# Patient Record
Sex: FEMALE | Race: WHITE | NOT HISPANIC OR LATINO | Employment: FULL TIME | URBAN - METROPOLITAN AREA
[De-identification: names, ages, dates, MRNs, and addresses within clinical notes are randomized per-mention and may not be internally consistent; named-entity substitution may affect disease eponyms.]

---

## 2017-01-17 ENCOUNTER — ALLSCRIPTS OFFICE VISIT (OUTPATIENT)
Dept: OTHER | Facility: OTHER | Age: 47
End: 2017-01-17

## 2017-01-17 LAB
BILIRUB UR QL STRIP: NORMAL
CLARITY UR: NORMAL
COLOR UR: YELLOW
GLUCOSE (HISTORICAL): NORMAL
HGB UR QL STRIP.AUTO: NORMAL
KETONES UR STRIP-MCNC: NORMAL MG/DL
LEUKOCYTE ESTERASE UR QL STRIP: NORMAL
NITRITE UR QL STRIP: NORMAL
PH UR STRIP.AUTO: 5 [PH]
PROT UR STRIP-MCNC: NORMAL MG/DL
SP GR UR STRIP.AUTO: 1000
UROBILINOGEN UR QL STRIP.AUTO: NORMAL

## 2017-01-18 ENCOUNTER — TRANSCRIBE ORDERS (OUTPATIENT)
Dept: ADMINISTRATIVE | Facility: HOSPITAL | Age: 47
End: 2017-01-18

## 2017-01-18 DIAGNOSIS — R10.819 ABDOMINAL TENDERNESS, UNSPECIFIED SITE: ICD-10-CM

## 2017-01-18 DIAGNOSIS — M54.50 ACUTE BILATERAL LOW BACK PAIN WITHOUT SCIATICA: Primary | ICD-10-CM

## 2017-01-23 ENCOUNTER — ALLSCRIPTS OFFICE VISIT (OUTPATIENT)
Dept: OTHER | Facility: OTHER | Age: 47
End: 2017-01-23

## 2017-01-24 ENCOUNTER — ALLSCRIPTS OFFICE VISIT (OUTPATIENT)
Dept: OTHER | Facility: OTHER | Age: 47
End: 2017-01-24

## 2017-01-24 ENCOUNTER — HOSPITAL ENCOUNTER (OUTPATIENT)
Dept: ULTRASOUND IMAGING | Facility: HOSPITAL | Age: 47
Discharge: HOME | End: 2017-01-24
Payer: COMMERCIAL

## 2017-01-24 DIAGNOSIS — M54.50 ACUTE BILATERAL LOW BACK PAIN WITHOUT SCIATICA: ICD-10-CM

## 2017-01-24 DIAGNOSIS — N83.299 OTHER OVARIAN CYST, UNSPECIFIED SIDE: ICD-10-CM

## 2017-01-24 DIAGNOSIS — N61.0 MASTITIS WITHOUT ABSCESS: ICD-10-CM

## 2017-01-24 DIAGNOSIS — R10.819 ABDOMINAL TENDERNESS, UNSPECIFIED SITE: ICD-10-CM

## 2017-01-24 PROCEDURE — 76830 TRANSVAGINAL US NON-OB: CPT

## 2017-01-24 PROCEDURE — 76700 US EXAM ABDOM COMPLETE: CPT

## 2017-01-24 PROCEDURE — 76856 US EXAM PELVIC COMPLETE: CPT

## 2017-01-25 ENCOUNTER — GENERIC CONVERSION - ENCOUNTER (OUTPATIENT)
Dept: OTHER | Facility: OTHER | Age: 47
End: 2017-01-25

## 2017-01-25 ENCOUNTER — LAB CONVERSION - ENCOUNTER (OUTPATIENT)
Dept: OTHER | Facility: OTHER | Age: 47
End: 2017-01-25

## 2017-01-25 LAB
25(OH)D3 SERPL-MCNC: 47 NG/ML (ref 30–100)
A/G RATIO (HISTORICAL): 2.1 (CALC) (ref 1–2.5)
ALBUMIN SERPL BCP-MCNC: 4.5 G/DL (ref 3.6–5.1)
ALP SERPL-CCNC: 92 U/L (ref 33–115)
ALT SERPL W P-5'-P-CCNC: 15 U/L (ref 6–29)
AMYLASE (HISTORICAL): 33 U/L (ref 21–101)
AST SERPL W P-5'-P-CCNC: 16 U/L (ref 10–35)
BILIRUB SERPL-MCNC: 0.3 MG/DL (ref 0.2–1.2)
BUN SERPL-MCNC: 10 MG/DL (ref 7–25)
BUN/CREA RATIO (HISTORICAL): NORMAL (CALC) (ref 6–22)
CALCIUM SERPL-MCNC: 9.5 MG/DL (ref 8.6–10.2)
CHLORIDE SERPL-SCNC: 104 MMOL/L (ref 98–110)
CHOLEST SERPL-MCNC: 194 MG/DL (ref 125–200)
CHOLEST/HDLC SERPL: 3.7 (CALC)
CO2 SERPL-SCNC: 26 MMOL/L (ref 20–31)
CONTAINER TYP (HISTORICAL): NORMAL
CREAT SERPL-MCNC: 0.87 MG/DL (ref 0.5–1.1)
DEPRECATED RDW RBC AUTO: 13.2 % (ref 11–15)
EGFR AFRICAN AMERICAN (HISTORICAL): 92 ML/MIN/1.73M2
EGFR-AMERICAN CALC (HISTORICAL): 79 ML/MIN/1.73M2
FINAL RESOLUTION (HISTORICAL): NORMAL
FOLATE SERPL-MCNC: 23.8 NG/ML
GAMMA GLOBULIN (HISTORICAL): 2.1 G/DL (CALC) (ref 1.9–3.7)
GLUCOSE (HISTORICAL): 88 MG/DL (ref 65–99)
HCT VFR BLD AUTO: 44.2 % (ref 35–45)
HDLC SERPL-MCNC: 53 MG/DL
HGB BLD-MCNC: 14.4 G/DL (ref 11.7–15.5)
LDL CHOLESTEROL (HISTORICAL): 109 MG/DL (CALC)
LIPASE SERPL-CCNC: 21 U/L (ref 7–60)
MCH RBC QN AUTO: 30.7 PG (ref 27–33)
MCHC RBC AUTO-ENTMCNC: 32.5 G/DL (ref 32–36)
MCV RBC AUTO: 94.5 FL (ref 80–100)
NON-HDL-CHOL (CHOL-HDL) (HISTORICAL): 141 MG/DL (CALC)
PLATELET # BLD AUTO: 332 THOUSAND/UL (ref 140–400)
PMV BLD AUTO: 8.7 FL (ref 7.5–12.5)
POTASSIUM SERPL-SCNC: 4.2 MMOL/L (ref 3.5–5.3)
QUESTION/PROBLEM (HISTORICAL): NORMAL
RBC # BLD AUTO: 4.68 MILLION/UL (ref 3.8–5.1)
SODIUM SERPL-SCNC: 139 MMOL/L (ref 135–146)
SPECIMEN STATUS REPORT (HISTORICAL): NORMAL
T4 FREE SERPL-MCNC: 0.9 NG/DL (ref 0.8–1.8)
TOTAL PROTEIN (HISTORICAL): 6.6 G/DL (ref 6.1–8.1)
TRIGL SERPL-MCNC: 160 MG/DL
TSH SERPL DL<=0.05 MIU/L-ACNC: 1.47 MIU/L
VIT B12 SERPL-MCNC: 790 PG/ML (ref 200–1100)
WBC # BLD AUTO: 10.1 THOUSAND/UL (ref 3.8–10.8)

## 2017-01-31 ENCOUNTER — GENERIC CONVERSION - ENCOUNTER (OUTPATIENT)
Dept: OTHER | Facility: OTHER | Age: 47
End: 2017-01-31

## 2017-01-31 LAB
ADDITIONAL INFORMATION (HISTORICAL): NORMAL
ADEQUACY: (HISTORICAL): NORMAL
CYTOTECHNOLOGIST: (HISTORICAL): NORMAL
INTERPRETATION (HISTORICAL): NORMAL
LMP (HISTORICAL): NORMAL
PREV. BX: (HISTORICAL): NORMAL
PREV. PAP (HISTORICAL): NORMAL
SOURCE (HISTORICAL): NORMAL

## 2017-02-01 ENCOUNTER — HOSPITAL ENCOUNTER (OUTPATIENT)
Dept: ULTRASOUND IMAGING | Facility: CLINIC | Age: 47
Discharge: HOME | End: 2017-02-01
Payer: COMMERCIAL

## 2017-02-01 DIAGNOSIS — N61.0 MASTITIS WITHOUT ABSCESS: ICD-10-CM

## 2017-02-01 PROCEDURE — 76642 ULTRASOUND BREAST LIMITED: CPT

## 2017-02-02 ENCOUNTER — GENERIC CONVERSION - ENCOUNTER (OUTPATIENT)
Dept: OTHER | Facility: OTHER | Age: 47
End: 2017-02-02

## 2017-02-03 ENCOUNTER — LAB REQUISITION (OUTPATIENT)
Dept: LAB | Facility: HOSPITAL | Age: 47
End: 2017-02-03
Payer: COMMERCIAL

## 2017-02-03 ENCOUNTER — ALLSCRIPTS OFFICE VISIT (OUTPATIENT)
Dept: OTHER | Facility: OTHER | Age: 47
End: 2017-02-03

## 2017-02-03 DIAGNOSIS — R93.89 ABNORMAL FINDINGS ON DIAGNOSTIC IMAGING OF OTHER SPECIFIED BODY STRUCTURES: ICD-10-CM

## 2017-02-03 PROCEDURE — 88305 TISSUE EXAM BY PATHOLOGIST: CPT | Performed by: OBSTETRICS & GYNECOLOGY

## 2017-02-13 ENCOUNTER — GENERIC CONVERSION - ENCOUNTER (OUTPATIENT)
Dept: OTHER | Facility: OTHER | Age: 47
End: 2017-02-13

## 2017-02-20 ENCOUNTER — ALLSCRIPTS OFFICE VISIT (OUTPATIENT)
Dept: OTHER | Facility: OTHER | Age: 47
End: 2017-02-20

## 2017-03-10 ENCOUNTER — ALLSCRIPTS OFFICE VISIT (OUTPATIENT)
Dept: OTHER | Facility: OTHER | Age: 47
End: 2017-03-10

## 2017-03-13 ENCOUNTER — ALLSCRIPTS OFFICE VISIT (OUTPATIENT)
Dept: OTHER | Facility: OTHER | Age: 47
End: 2017-03-13

## 2017-04-10 ENCOUNTER — ALLSCRIPTS OFFICE VISIT (OUTPATIENT)
Dept: OTHER | Facility: OTHER | Age: 47
End: 2017-04-10

## 2017-05-04 ENCOUNTER — ALLSCRIPTS OFFICE VISIT (OUTPATIENT)
Dept: OTHER | Facility: OTHER | Age: 47
End: 2017-05-04

## 2017-05-08 ENCOUNTER — ALLSCRIPTS OFFICE VISIT (OUTPATIENT)
Dept: OTHER | Facility: OTHER | Age: 47
End: 2017-05-08

## 2017-06-05 ENCOUNTER — ALLSCRIPTS OFFICE VISIT (OUTPATIENT)
Dept: OTHER | Facility: OTHER | Age: 47
End: 2017-06-05

## 2017-06-19 ENCOUNTER — ALLSCRIPTS OFFICE VISIT (OUTPATIENT)
Dept: OTHER | Facility: OTHER | Age: 47
End: 2017-06-19

## 2017-07-20 ENCOUNTER — GENERIC CONVERSION - ENCOUNTER (OUTPATIENT)
Dept: OTHER | Facility: OTHER | Age: 47
End: 2017-07-20

## 2017-08-02 ENCOUNTER — ALLSCRIPTS OFFICE VISIT (OUTPATIENT)
Dept: OTHER | Facility: OTHER | Age: 47
End: 2017-08-02

## 2017-08-17 DIAGNOSIS — N83.202 CYST OF LEFT OVARY: ICD-10-CM

## 2017-08-17 DIAGNOSIS — N83.299 OTHER OVARIAN CYST, UNSPECIFIED SIDE: ICD-10-CM

## 2017-08-17 DIAGNOSIS — N83.201 CYST OF RIGHT OVARY: ICD-10-CM

## 2017-08-17 DIAGNOSIS — R93.89 ABNORMAL FINDINGS ON DIAGNOSTIC IMAGING OF OTHER SPECIFIED BODY STRUCTURES: ICD-10-CM

## 2017-08-17 DIAGNOSIS — R06.00 DYSPNEA: ICD-10-CM

## 2017-09-05 ENCOUNTER — ALLSCRIPTS OFFICE VISIT (OUTPATIENT)
Dept: OTHER | Facility: OTHER | Age: 47
End: 2017-09-05

## 2017-09-20 ENCOUNTER — ALLSCRIPTS OFFICE VISIT (OUTPATIENT)
Dept: OTHER | Facility: OTHER | Age: 47
End: 2017-09-20

## 2017-10-26 ENCOUNTER — ALLSCRIPTS OFFICE VISIT (OUTPATIENT)
Dept: OTHER | Facility: OTHER | Age: 47
End: 2017-10-26

## 2017-10-26 DIAGNOSIS — Z12.39 ENCOUNTER FOR OTHER SCREENING FOR MALIGNANT NEOPLASM OF BREAST: ICD-10-CM

## 2017-10-26 DIAGNOSIS — Z11.59 ENCOUNTER FOR SCREENING FOR OTHER VIRAL DISEASES (CODE): ICD-10-CM

## 2017-10-26 DIAGNOSIS — R07.9 CHEST PAIN: ICD-10-CM

## 2017-10-27 ENCOUNTER — LAB CONVERSION - ENCOUNTER (OUTPATIENT)
Dept: OTHER | Facility: OTHER | Age: 47
End: 2017-10-27

## 2017-10-27 ENCOUNTER — GENERIC CONVERSION - ENCOUNTER (OUTPATIENT)
Dept: OTHER | Facility: OTHER | Age: 47
End: 2017-10-27

## 2017-10-27 LAB
HEPATITIS C ANTIBODY (HISTORICAL): NORMAL
SIGNAL TO CUT-OFF (HISTORICAL): 0.01

## 2017-11-06 ENCOUNTER — TRANSCRIBE ORDERS (OUTPATIENT)
Dept: ADMINISTRATIVE | Facility: HOSPITAL | Age: 47
End: 2017-11-06

## 2017-11-06 DIAGNOSIS — Z12.39 SCREENING BREAST EXAMINATION: Primary | ICD-10-CM

## 2017-11-27 ENCOUNTER — ALLSCRIPTS OFFICE VISIT (OUTPATIENT)
Dept: OTHER | Facility: OTHER | Age: 47
End: 2017-11-27

## 2017-12-06 ENCOUNTER — HOSPITAL ENCOUNTER (OUTPATIENT)
Dept: MAMMOGRAPHY | Facility: CLINIC | Age: 47
Discharge: HOME | End: 2017-12-06
Payer: COMMERCIAL

## 2017-12-06 ENCOUNTER — GENERIC CONVERSION - ENCOUNTER (OUTPATIENT)
Dept: FAMILY MEDICINE CLINIC | Facility: HOSPITAL | Age: 47
End: 2017-12-06

## 2017-12-06 DIAGNOSIS — Z12.39 SCREENING BREAST EXAMINATION: ICD-10-CM

## 2017-12-06 PROCEDURE — G0202 SCR MAMMO BI INCL CAD: HCPCS

## 2017-12-06 PROCEDURE — 77063 BREAST TOMOSYNTHESIS BI: CPT

## 2017-12-08 ENCOUNTER — GENERIC CONVERSION - ENCOUNTER (OUTPATIENT)
Dept: OTHER | Facility: OTHER | Age: 47
End: 2017-12-08

## 2017-12-12 DIAGNOSIS — K76.9 LIVER DISEASE: ICD-10-CM

## 2017-12-12 DIAGNOSIS — J45.30 MILD PERSISTENT ASTHMA, UNCOMPLICATED: ICD-10-CM

## 2017-12-12 DIAGNOSIS — Z12.39 ENCOUNTER FOR OTHER SCREENING FOR MALIGNANT NEOPLASM OF BREAST: ICD-10-CM

## 2017-12-13 ENCOUNTER — GENERIC CONVERSION - ENCOUNTER (OUTPATIENT)
Dept: OTHER | Facility: OTHER | Age: 47
End: 2017-12-13

## 2017-12-13 ENCOUNTER — HOSPITAL ENCOUNTER (OUTPATIENT)
Dept: MAMMOGRAPHY | Facility: CLINIC | Age: 47
Discharge: HOME | End: 2017-12-13
Payer: COMMERCIAL

## 2017-12-13 ENCOUNTER — HOSPITAL ENCOUNTER (OUTPATIENT)
Dept: ULTRASOUND IMAGING | Facility: CLINIC | Age: 47
Discharge: HOME | End: 2017-12-13
Payer: COMMERCIAL

## 2017-12-13 ENCOUNTER — GENERIC CONVERSION - ENCOUNTER (OUTPATIENT)
Dept: FAMILY MEDICINE CLINIC | Facility: HOSPITAL | Age: 47
End: 2017-12-13

## 2017-12-13 DIAGNOSIS — R92.8 ABNORMAL MAMMOGRAM: ICD-10-CM

## 2017-12-13 PROCEDURE — G0279 TOMOSYNTHESIS, MAMMO: HCPCS

## 2017-12-13 PROCEDURE — G0206 DX MAMMO INCL CAD UNI: HCPCS

## 2017-12-13 PROCEDURE — 76642 ULTRASOUND BREAST LIMITED: CPT

## 2017-12-15 ENCOUNTER — GENERIC CONVERSION - ENCOUNTER (OUTPATIENT)
Dept: FAMILY MEDICINE CLINIC | Facility: HOSPITAL | Age: 47
End: 2017-12-15

## 2017-12-20 ENCOUNTER — ALLSCRIPTS OFFICE VISIT (OUTPATIENT)
Dept: OTHER | Facility: OTHER | Age: 47
End: 2017-12-20

## 2017-12-27 ENCOUNTER — ALLSCRIPTS OFFICE VISIT (OUTPATIENT)
Dept: OTHER | Facility: OTHER | Age: 47
End: 2017-12-27

## 2017-12-28 NOTE — PROGRESS NOTES
Assessment   1  Mild persistent asthma without complication (394 78) (T10 46)   2  Depression, major, recurrent (296 30) (F33 9)    Plan   Depression, major, recurrent    · Sertraline HCl - 100 MG Oral Tablet; TAKE 1 1/ 2 TABLETS  DAILY  Mild persistent asthma without complication    · From  Ventolin  (90 Base) MCG/ACT Inhalation Aerosol Solution INHALE 2 PUFFS    BY MOUTH ONCE EVERY 6 HOURS AS NEEDED To Ventolin  (90 Base) MCG/ACT Inhalation    Aerosol Solution INHALE 2 PUFFS EVERY 4 TO 6 HOURS AS NEEDED    Discussion/Summary   Discussion Summary:    Health plan covers a nicotine patch or nicotine gums to help quit smoking  Counseling Documentation With Imm: The was counseled regarding instructions for management  Self Referrals:    Self Referrals: Yes      Chief Complaint   Chief Complaint Free Text Note Form: Here for f/u  dk   in emergi-care since last here  lv       History of Present Illness   Asthma (Follow-Up): The patient is being seen for a routine clinic follow-up of asthma  The patient's long-term asthma pattern has been classified as intermittent  Interval Symptoms:    denies wheezing,-- improved coughing-- and-- denies shortness of breath  Associated symptoms include no chest pain or discomfort  Review of Systems   Complete-Female:      Constitutional: no fever  Cardiovascular: as noted in HPI  Respiratory: as noted in HPI  Psychiatric: stable on zoloft 150mg, but-- no anxiety-- and-- no depression  Active Problems   1  Anxiety (300 00) (F41 9)   2  Complex ovarian cyst (620 2) (N83 299)   3  Depression, major, recurrent (296 30) (F33 9)   4  Mild persistent asthma without complication (896 21) (N12 43)   5  PTSD (post-traumatic stress disorder) (309 81) (F43 10)   6  Tobacco abuse (305 1) (Z72 0)    Past Medical History   1  Diarrhea (787 91) (R19 7)   2  History of Dizziness and giddiness (780 4) (R42)   3   History of Finger sprain (842 10) (S50 375F) 4  History of dysmenorrhea (V13 29) (Z87 42)   5  History of hypertension (V12 59) (Z86 79)   6  History of irritable bowel syndrome (V12 79) (Z87 19)   7  History of sinusitis (V12 69) (Z87 09)   8  History of skin disorder (V13 3) (Z87 2)   9  History of tinea corporis (V12 09) (Z86 19)   10  History of UTI (V13 02) (Z87 440)   11  History of Knee contusion (924 11) (S80 00XA)   12  History of Loose stools (787 7) (R19 5)   13  History of Lumbar back sprain (847 2) (S33 5XXA)   14  History of Menopausal symptoms (627 2) (N95 1)   15  History of Screening for colon cancer (V76 51) (Z12 11)   16  History of Tick bite (919 4,E906 4) (W57 XXXA)   17  History of Viral labyrinthitis (386 35) (H83 09)  Active Problems And Past Medical History Reviewed: The active problems and past medical history were reviewed and updated today  Surgical History   1  History of Back Surgery   2  History of Cholecystectomy   3  History of Gallbladder Surgery   4  History of Knee Surgery   5  History of Surgery   6  History of Tonsillectomy    Family History   Mother    1  Family history of Bipolar affective disorder   2  Family history of Diverticulitis   3  Family history of coagulation disorder (V18 3) (Z83 2)   4  Family history of Suicidal ideation  Sibling    5  Family history of Aortic aneurysm  Sister    6  Family history of OCD (obsessive compulsive disorder)  Brother    7  Family history of Bipolar affective   8  Family history of substance abuse (V17 0) (Z81 4)   9  Family history of suicide attempt (V17 0) (Z81 8)  Family History    10  Family history of abdominal aortic aneurysm (V17 49) (Z82 49)   11  Family history of diabetes mellitus (V18 0) (Z83 3)    Social History    · Always uses seat belt   · Cultural background   · Current every day smoker (305 1) (F17 200)   · No drug use   · Non drinker / no alcohol use   · Primary language is English   · Racial background   · Single  Social History Reviewed:  The social history was reviewed and updated today  Current Meds    1  Multi-Vitamin Oral Tablet; TAKE 1 TABLET DAILY; Therapy: 87XQM6859 to Recorded   2  Sertraline HCl - 100 MG Oral Tablet; TAKE 1 1/ 2 TABLETS  DAILY; Therapy: 46SBU2818 to [phone]  Requested for: 87XBU1111; Last Rx:11Oct2017     Ordered   3  Super B Complex Maxi Oral Tablet; Therapy: (Recorded:17Jan2017) to Recorded   4  Symbicort 160-4 5 MCG/ACT Inhalation Aerosol; INHALE 2 PUFFS Twice daily; Therapy: 22HIK1217 to (Last Rx:14Cxs5441) Ordered   5  Ventolin  (90 Base) MCG/ACT Inhalation Aerosol Solution; INHALE 2 PUFFS BY MOUTH     ONCE EVERY 6 HOURS AS NEEDED; Therapy: 82VZN0910 to [phone])  Requested for: 62Yes3829; Last Rx:78Kte7433;     Status: ACTIVE - Transmit to Pharmacy - Awaiting Verification Ordered   6  Vitamin D 1000 UNIT CAPS; TAKE 2 CAPSULE Daily; Therapy: 70NSE9138 to Recorded  Medication List Reviewed: The medication list was reviewed and updated today  Allergies   1  Levaquin TABS   2  Penicillins    Vitals   Vital Signs    Recorded: 27NUL1959 08:27AM   Heart Rate 96   Respiration 16   Systolic 661, LUE, Sitting   Diastolic 76, LUE, Sitting   Height 5 ft 3 in   Weight 169 lb    BMI Calculated 29 94   BSA Calculated 1 8     Physical Exam        Constitutional      General appearance: Abnormal   well developed,-- comfortable-- and-- overweight  Ears, Nose, Mouth, and Throat      Oropharynx: Normal with no erythema, edema, exudate or lesions  Pulmonary      Auscultation of lungs: Clear to auscultation  Cardiovascular      Auscultation of heart: Normal rate and rhythm, normal S1 and S2, without murmurs  Abdomen      Abdomen: Non-tender, no masses         Psychiatric      Mood and affect: Normal           Future Appointments      Date/Time Provider Specialty Site   01/17/2018 05:00 PM John Choe [facility] 02/14/2018 03:00 PM ARETHA [facility]   03/05/2018 06:00 PM Russ Stanford [facility]   04/16/2018 06:00 PM Russ Stanford [facility]   03/07/2018 09:00 AM Burke Gamble [facility] 81     Signatures    Electronically signed by : WILLIAM Aguiar ; Dec 27 2017  8:53AM EST                       (Author)

## 2018-01-10 NOTE — PSYCH
Progress Note  Psychotherapy Provided [facility]: Individual Psychotherapy 45 minutes provided today  Goals addressed in session:   Goals: 1, 2 & 3  D- Zhao Amaya stated that she had been continuing to struggle with her stress level due to the issues at work  She continued to experience a lack of motivation and energy as well as focus and concentration  She stated that she consulted with her PCP who is adjusting her medication  She stated that she is beginning to experience some relief in terms of her symptoms  Continuing to work on i creasing boundary setting  Also continuing to work on processing the loss of her mother  Giving supportive therapy  A- progress - Continuing to process her emotions  P- Continue treatment       Pain Scale and Suicide Risk [facility]: Current Pain Assessment: no pain   On a scale of 0 to 10, the patient rates current pain at 0   Behavioral Health Treatment Plan [facility]: Diagnosis and Treatment Plan explained to patient, patient relates understanding diagnosis and is agreeable to Treatment Plan  Assessment    1  Anxiety (300 00) (F41 9)   2   Depressive disorder (311) (F32 9)    Signatures   Electronically signed by : Samanta Benítez LCSW; Mar 15 2017 11:06AM EST                       (Author)

## 2018-01-10 NOTE — PSYCH
Treatment Plan Tracking      #2 Treatment Plan not completed within required time limits due to: Client presented with emotional/behavioral issues that required clinical intervention          Signatures   Electronically signed by : Kateryna Kaiser LCSW; Jun 6 2017 12:05PM EST                       (Author)

## 2018-01-10 NOTE — RESULT NOTES
Verified Results  (Q) THINPREP PAP [id number] 12:00AM Sherice Tamez Gilda     Test Name Result Flag Reference   CLINICAL INFORMATION:      NONE GIVEN   LMP:      NONE GIVEN   PREV  PAP:      NONE GIVEN   PREV  BX:      NONE GIVEN   SOURCE:      CERVICAL   STATEMENT OF ADEQUACY:      Satisfactory for evaluation  Endocervical/transformation zone component  present  Age and/or menstrual status not provided   INTERPRETATION/RESULT:      Negative for intraepithelial lesion or malignancy     CYTOTECHNOLOGIST:      RASHARD Thurman(ASCP)  CT screening location: [address] [facility], [address] [place] [address]

## 2018-01-10 NOTE — PSYCH
Progress Note  Psychotherapy Provided [facility]: Individual Psychotherapy 45 minutes provided today  Goals addressed in session:   Goals: Dealing with stress  D- Van Thornton stated that she ha continued to struggle with stress at work as well as her mother's death  Discussing her relationship with her mother and ways that she can continue to process her emotions and continue with the grieving process  PT also continuing to express her disappointment in her father as well as her anger  Continuing to work on use of coping mechanisms ans self expression  Giving supportive therapy  A- Progress - COntinuing to process her emotions  P- Continue treatment       Pain Scale and Suicide Risk [facility]: Current Pain Assessment: no pain   Behavioral Health Treatment Plan [facility]: Diagnosis and Treatment Plan explained to patient, patient relates understanding diagnosis and is agreeable to Treatment Plan  Assessment    1  Anxiety (300 00) (F41 9)   2   Depressive disorder (311) (F32 9)    Signatures   Electronically signed by : Abhishek Nice LCSW; Jun 8 2016  4:52PM EST                       (Author)

## 2018-01-10 NOTE — PSYCH
Treatment Plan Tracking      #2 Treatment Plan not completed within required time limits due to: Client presented with emotional/behavioral issues that required clinical intervention          Signatures   Electronically signed by : Emeterio Hayes LCSW; Jun 19 2017  1:01PM EST                       (Author)

## 2018-01-10 NOTE — PSYCH
Treatment Plan Tracking      #2 Treatment Plan not completed within required time limits due to: Client presented with emotional/behavioral issues that required clinical intervention          Signatures   Electronically signed by : Adriano Delatorre LCSW; Nov 28 2017 10:41AM EST                       (Author)

## 2018-01-11 NOTE — PSYCH
Progress Note  Psychotherapy Provided [facility]: Individual Psychotherapy 45 minutes provided today  Goals addressed in session:   Goals: 1  D- Valentina Lora stated that she continues to struggle greatly with the stress in her life created by her job  She stated that she has been experiencing a lack of motivation to complete things at home due to hr exhaustion from work  She also stated that coworkers continue to leave and she is uncertain what to do with her situation  Exploring her options and discussing the unhealthy environment that she is in and how it is affecting her  Continuing to work on use of coping mechanisms for stress as well as boundary setting in her job  Giving supportive therapy  A - Progress - Continuing to process her emotions  P-Continue treatment       Pain Scale and Suicide Risk [facility]: Current Pain Assessment: no pain   On a scale of 0 to 10, the patient rates current pain at 0   Behavioral Health Treatment Plan [facility]: Diagnosis and Treatment Plan explained to patient, patient relates understanding diagnosis and is agreeable to Treatment Plan  Assessment    1  Anxiety (300 00) (F41 9)   2  Depression, major, recurrent (296 30) (F33 9)   3   PTSD (post-traumatic stress disorder) (309 81) (F43 10)    Signatures   Electronically signed by : Janine Mendez LCSW; Nov 28 2017 10:41AM EST                       (Author)

## 2018-01-11 NOTE — PSYCH
Progress Note  Psychotherapy Provided [facility]: Individual Psychotherapy minutes provided today  Goals addressed in session:   Goals: Dealing with the loss of her mother  D- PT stated that she is continuing to struggle with her mother's death  PT shared that she has a lack of motivation to get out of bed in the morning and also to complete household tasks  Processing her emotions and discussing the circumstances surrounding her mother's death  PT expressing anger and hurt because her mother hadn't been taking care of herself and had also not been forthcoming with how ill she was  Discussing writing her mother a letter expressing her emotions and going to her final resting place to read it to he  Also continuing to discuss self care and the use of her support system  Giving supportive therapy  A- Progress - Continuing to process her emotions  P- COntinue treatment       Pain Scale and Suicide Risk [facility]: Current Pain Assessment: no pain   Current suicide risk is low   Behavioral Health Treatment Plan [facility]: Diagnosis and Treatment Plan explained to patient, patient relates understanding diagnosis and is agreeable to Treatment Plan  Assessment    1   Depressive disorder (311) (F32 9)    Signatures   Electronically signed by : Adriano Delatorre LCSW; Feb 5 2016  9:56AM EST                       (Author)

## 2018-01-12 VITALS
BODY MASS INDEX: 30.66 KG/M2 | TEMPERATURE: 98.2 F | WEIGHT: 173.03 LBS | HEART RATE: 84 BPM | DIASTOLIC BLOOD PRESSURE: 76 MMHG | SYSTOLIC BLOOD PRESSURE: 102 MMHG | HEIGHT: 63 IN

## 2018-01-12 VITALS
SYSTOLIC BLOOD PRESSURE: 122 MMHG | WEIGHT: 172 LBS | HEIGHT: 63 IN | BODY MASS INDEX: 30.48 KG/M2 | DIASTOLIC BLOOD PRESSURE: 76 MMHG

## 2018-01-12 NOTE — PSYCH
Progress Note  Psychotherapy Provided [facility]: Individual Psychotherapy 45 minutes provided today  Goals addressed in session:   Goals: 1 & 2  D- Eliezer Hanley stated that work continues to be extremely stressful  She stated that she did speak to her supervisor regarding her coworkers inappropriate behaviors  Processing her emotions and discussing ways to cope with the unhealthy environment as well as seek other positions  Continuing to work on setting boundaries with her coworkers  Giving supportive therapy  A- Progress- Continuing to work on use of coping mechanisms and boundary setting  P- Continue treatment       Pain Scale and Suicide Risk [facility]: Current Pain Assessment: no pain   On a scale of 0 to 10, the patient rates current pain at 0   Behavioral Health Treatment Plan [facility]: Diagnosis and Treatment Plan explained to patient, patient relates understanding diagnosis and is agreeable to Treatment Plan  Assessment    1  Anxiety (300 00) (F41 9)   2   Depressive disorder (311) (F32 9)    Signatures   Electronically signed by : Jared Torres LCSW; Jan 25 2017  3:36PM EST                       (Author)

## 2018-01-12 NOTE — PSYCH
Progress Note  Psychotherapy Provided [facility]: Individual Psychotherapy 45 minutes provided today  Goals addressed in session:   Goals: Dealing with stress at work  D- PT stated that she has been experiencing stress at work with several co-workers  Discussing the issues and ways to set effective boundaries  Processing her emotions and discussing ways to communicate effectively with her co-workers when they have upset her  PT continuing to communicate her grief regarding the loss of her mother  Giving supportive therapy  A- Progress - Continuing to process her emotions  P- Continue treatment       Pain Scale and Suicide Risk [facility]: Current Pain Assessment: no pain   Current suicide risk is low   Behavioral Health Treatment Plan [facility]: Diagnosis and Treatment Plan explained to patient, patient relates understanding diagnosis and is agreeable to Treatment Plan  Assessment    1  Anxiety (300 00) (F41 9)   2   Depressive disorder (311) (F32 9)    Signatures   Electronically signed by : Maxime Patrick LCSW; Mar 10 2016  9:32AM EST                       (Author)

## 2018-01-13 VITALS
WEIGHT: 167 LBS | DIASTOLIC BLOOD PRESSURE: 78 MMHG | SYSTOLIC BLOOD PRESSURE: 134 MMHG | HEIGHT: 63 IN | TEMPERATURE: 98.8 F | BODY MASS INDEX: 29.59 KG/M2 | HEART RATE: 88 BPM

## 2018-01-13 VITALS
HEIGHT: 63 IN | WEIGHT: 169.5 LBS | BODY MASS INDEX: 30.03 KG/M2 | HEART RATE: 72 BPM | SYSTOLIC BLOOD PRESSURE: 136 MMHG | DIASTOLIC BLOOD PRESSURE: 70 MMHG

## 2018-01-13 VITALS
HEART RATE: 80 BPM | SYSTOLIC BLOOD PRESSURE: 130 MMHG | WEIGHT: 171.03 LBS | HEIGHT: 63 IN | BODY MASS INDEX: 30.3 KG/M2 | DIASTOLIC BLOOD PRESSURE: 72 MMHG

## 2018-01-13 NOTE — PSYCH
Treatment Plan Tracking      #2 Treatment Plan not completed within required time limits due to: Client presented with emotional/behavioral issues that required clinical intervention          Signatures   Electronically signed by : Adele Blevins LCSW; Aug  3 2017 10:54AM EST                       (Author)

## 2018-01-13 NOTE — PSYCH
Date of Initial Treatment Plan: 6/8/16  Date of Current Treatment Plan: 6/8/16  Treatment Plan 1  Strengths/Personal Resources for Self Care: Kuldeep Aquino, hard working, ambitious, good leader, creative, perceptive  Diagnosis:   Axis I: Anxiety, depressive disorder   Axis II: Deferred   Axis III: Good health     Current Challenges/Problems/Needs: Work  Boundaries  Loss of mother  Long Term Goals:   Be more professional   Target Date: 10/8/16      don't let myself be pushed around   Target Date: 10/8/16      Be able to start my life now   Target Date: 10/8/16      Short Term Objectives:   Goal 1:   Keep my temper in check  Figure how to get what I want but without being unprofessional  Deal appropriately with my over controlling boss  Target Date: 10/8/16      Goal 2:   Make boundaries clear early in the relationship  Be consistent with boundary setting  Be able to set boundaries without being mean  Target Date: 10/8/16      Goal 3:   Adapt to my new situation  Be able to process my grief  be able to restart my hobbies and not be sad  Figure out how to increase my ambition level to normal      Target Date: 10/8/16      GOAL 1: Modality: Individual 2 x per month Target Date: 10/8/16         GOAL 2: Modality: Individual 2 x per month Target Date: 10/8/16         GOAL 3: Modality: Individual 2 x per month Target Date: 10/8/16         The first scheduled review date is 10/8/16  The expected length of service is Unknown  Level of functioning at initial assessment: 60  The highest level of functioning in the past year was 60  The current level of functioning is 60  Patient Signature: _________________________________ Date/Time: ______________        1  Anxiety (300 00) (F41 9)   2  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

## 2018-01-13 NOTE — PSYCH
Progress Note  Psychotherapy Provided [facility]: Individual Psychotherapy 45 minutes provided today  Goals addressed in session:   Goals: 1  D- Evan Keller stated that she has been struggling with issues at work  She state that the environment at work continues to become more toxic  processing her emotions and continuing to discuss ways to set effective limits at work  ALso continuing to discuss ways to set boundaries with her supervisor and coworkers  Giving supportive therapy  A- Progress - Continuing to process her emotions  P- Continue treatment       Pain Scale and Suicide Risk [facility]: Current Pain Assessment: no pain   On a scale of 0 to 10, the patient rates current pain at 0   Behavioral Health Treatment Plan [facility]: Diagnosis and Treatment Plan explained to patient, patient relates understanding diagnosis and is agreeable to Treatment Plan  Assessment    1  Anxiety (300 00) (F41 9)   2   Depressive disorder (311) (F32 9)    Signatures   Electronically signed by : Guille Bar LCSW; May 17 2017  5:41PM EST                       (Author)

## 2018-01-13 NOTE — PSYCH
Progress Note  Psychotherapy Provided [facility]: Individual Psychotherapy 45 minutes provided today  Goals addressed in session:   Goals: 2  D- Leonel Schlatter stated that she is still experiencing a large amount of stress at work  She shared that she has been experiencing migraine headaches and high blood pressure  Discussing the importance of self care and use of healthy coping mechanisms  Leonel Schlatter discussing various work scenarios that she is experiencing  Role playing and utilizing problem solving skills for each situation  Giving supportive therapy  A- progress - Continuing to process her emotions  P- Continue treatment       Pain Scale and Suicide Risk [facility]: Current Pain Assessment: moderate to severe   On a scale of 0 to 10, the patient rates current pain at 5   Behavioral Health Treatment Plan [facility]: Diagnosis and Treatment Plan explained to patient, patient relates understanding diagnosis and is agreeable to Treatment Plan  Assessment    1  Anxiety (300 00) (F41 9)   2   Depressive disorder (311) (F32 9)    Signatures   Electronically signed by : Amor Regalado LCSW; Aug  8 2016  7:12PM EST                       (Author)

## 2018-01-13 NOTE — PSYCH
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)   3  Depressive disorder (311) (F32 9)   4  Diaphragmatic hernia (553 3) (K44 9)   5  Meniscal injury (959 7) (S83 90XA)   6  Other screening mammogram (V76 12) (Z12 31)   7  Severe persistent asthmatic bronchitis without complication (972 51) (Z12 51)   8  Sinusitis, bacterial (473 9,041 9) (J32 9,B96 89)    Current Meds   1  Cefuroxime Axetil 500 MG Oral Tablet (Ceftin); 1 pill bid for 10 days; Therapy: 87JIA4902 to (Last Rx:10Oct2016)  Requested for: 37DYC1683 Ordered   2  Multi-Vitamin Oral Tablet; TAKE 1 TABLET DAILY; Therapy: 27YHQ6914 to Recorded   3  Sertraline HCl - 50 MG Oral Tablet; 1 AND 1/2 TABS DAILY; Therapy: (Rozell Epley) to  Requested for: 42FEK9032 Recorded   4  Vitamin D 1000 UNIT CAPS; TAKE 2 CAPSULE Daily; Therapy: 84GXH4719 to Recorded    Allergies    1  Levaquin TABS   2   Penicillins    Signatures   Electronically signed by : Lorenzo Guerrero LCSW; Oct 12 2016  6:34PM EST                       (Author)

## 2018-01-13 NOTE — PSYCH
Treatment Plan Tracking      #2 Treatment Plan not completed within required time limits due to: Client presented with emotional/behavioral issues that required clinical intervention          Signatures   Electronically signed by : Charo Crain LCSW; Sep 20 2017  7:02PM EST                       (Author)

## 2018-01-14 NOTE — PSYCH
Treatment Plan Tracking      #2 Treatment Plan not completed within required time limits due to: Client presented with emotional/behavioral issues that required clinical intervention          Signatures   Electronically signed by : Samanta Benítez LCSW; Mar 15 2017 11:07AM EST                       (Author)

## 2018-01-14 NOTE — PSYCH
Progress Note  Psychotherapy Provided [facility]: Individual Psychotherapy 45 minutes provided today  Goals addressed in session:   Goals: 1 & 2  d- Evan Keller stated that she continues to struggle with stress in the work place  She shared that she was given a warning by her boss related to an incident that she was not responsible for  Discussing and role playing ways for her to communicate effectively with her boss  Also discussing how she can continue to advocate for herself through human resources  Continuing to work on use of healthy coping skills for stress  Giving supportive therapy  A- Progress- Continuing to increase assertiveness  P- COntinue treatment       Pain Scale and Suicide Risk [facility]: Current Pain Assessment: no pain   On a scale of 0 to 10, the patient rates current pain at 0   Behavioral Health Treatment Plan [facility]: Diagnosis and Treatment Plan explained to patient, patient relates understanding diagnosis and is agreeable to Treatment Plan  Assessment    1  Anxiety (300 00) (F41 9)   2   Depressive disorder (311) (F32 9)    Signatures   Electronically signed by : Guille Bar LCSW; Jul 11 2016 11:55AM EST                       (Author)

## 2018-01-14 NOTE — PSYCH
Progress Note  Psychotherapy Provided [facility]: Individual Psychotherapy 45 minutes provided today  Goals addressed in session:   Goals: 1  D- Janene Martinez stated that she has been under an extreme amount of stress with her job  In addition, she had an reaction to the Effexor medication and had been ill for several months  Discussing the stress at work and ways to cope in addition to continuing to work on increasing assertive behavior and boundary setting in the work o=place  Giving supportive therapy  A- PRogress - Continuing to work on increasing assertive behavior and boundary setting in the workplace  P-Continue treatment       Pain Scale and Suicide Risk [facility]: Current Pain Assessment: no pain   On a scale of 0 to 10, the patient rates current pain at 0   Behavioral Health Treatment Plan [address] [place] [address] [address]: Diagnosis and Treatment Plan explained to patient, patient relates understanding diagnosis and is agreeable to Treatment Plan  Assessment    1  Anxiety (300 00) (F41 9)   2  Depression, major, recurrent (296 30) (F33 9)   3   PTSD (post-traumatic stress disorder) (309 81) (F43 10)    Signatures   Electronically signed by : Valentine Lorenz LCSW; Sep 20 2017  7:02PM EST                       (Author)

## 2018-01-15 VITALS
DIASTOLIC BLOOD PRESSURE: 78 MMHG | SYSTOLIC BLOOD PRESSURE: 138 MMHG | HEIGHT: 63 IN | TEMPERATURE: 98.9 F | RESPIRATION RATE: 16 BRPM | WEIGHT: 167 LBS | BODY MASS INDEX: 29.59 KG/M2

## 2018-01-15 NOTE — PSYCH
Progress Note  Psychotherapy Provided [facility]: Individual Psychotherapy 45 minutes provided today  Goals addressed in session:   Goals: 1  D- Evan Keller continues to struggle with the stress that she experiences at work  She shared that she continues to struggle with the loss of her mother  Continuing to work on boundary setting in relationships as well as processing her grief  Giving supportive therapy  A- PRogress - Continuing to process her emotions  P-Continue treatment       Pain Scale and Suicide Risk [facility]: Current Pain Assessment: no pain   On a scale of 0 to 10, the patient rates current pain at 0   Behavioral Health Treatment Plan [employer]: Diagnosis and Treatment Plan explained to patient, patient relates understanding diagnosis and is agreeable to Treatment Plan  Assessment    1  Anxiety (300 00) (F41 9)   2   Depressive disorder (311) (F32 9)    Signatures   Electronically signed by : Guille Bar LCSW; Jun 27 2017  3:51PM EST                       (Author)

## 2018-01-15 NOTE — PSYCH
Progress Note  Psychotherapy Provided [facility]: Individual Psychotherapy 45 minutes provided today  Goals addressed in session:   Goals: 1, 2 & 3  D- Svitlana Staton stated that she continues to experience stress in the work place  She shared that she had the opportunity to speak with her supervisors regarding the things going on lou are bothering her  Discussing ways to communicate issues without also communicating hostility  PT also discussing her continued grief over the loss of her mother  Discussing treatment goals and creating a treatment plan  Giving supportive therapy  A- Progress - Continuing to process her emotions  P- Continue treatment       Pain Scale and Suicide Risk [facility]: Current Pain Assessment: no pain   On a scale of 0 to 10, the patient rates current pain at 0   Behavioral Health Treatment Plan [address] [place] [address] [address]: Diagnosis and Treatment Plan explained to patient, patient relates understanding diagnosis and is agreeable to Treatment Plan  Assessment    1  Anxiety (300 00) (F41 9)   2   Depressive disorder (311) (F32 9)    Signatures   Electronically signed by : Golden Watson LCSW; Jun 8 2016  7:49PM EST                       (Author)

## 2018-01-15 NOTE — PSYCH
Progress Note  Psychotherapy Provided [facility]: Individual Psychotherapy 45 minutes provided today  Goals addressed in session:   Goals: 1  D- Eller Krabbe requested an emergency session due to the stress that she is experiencing at work  She stated that she became angry and raised her voice at her supervisor yesterday due to the ongoing personnel issues  Processing her emotions an discussing ways for her to cope with the stress at work as well as ways to set boundaries with her coworkers  Also discussing potential action that she can take through human resources  Giving supportive therapy  A- progress - Continuing to work on setting boundaries with coworkers  P- Continue treatment       Pain Scale and Suicide Risk [facility]: Current Pain Assessment: no pain   On a scale of 0 to 10, the patient rates current pain at 0   Behavioral Health Treatment Plan [facility]: Diagnosis and Treatment Plan explained to patient, patient relates understanding diagnosis and is agreeable to Treatment Plan  Assessment    1  Anxiety (300 00) (F41 9)   2   Depressive disorder (311) (F32 9)    Signatures   Electronically signed by : Gordo Shrestha LCSW; Dec 20 2016  8:38AM EST                       (Author)

## 2018-01-15 NOTE — PSYCH
Treatment Plan Tracking      #2 Treatment Plan not completed within required time limits due to: Client presented with emotional/behavioral issues that required clinical intervention          Signatures   Electronically signed by : Marcin Dale, LILLIANA; Jan 25 2017  3:36PM EST                       (Author)

## 2018-01-15 NOTE — MISCELLANEOUS
Message   Recorded as Task   Date: 02/09/2017 11:15 AM, Created By: Guera Fernandez   Task Name: Go to Result   Assigned To: [facility]   Regarding Patient: Chris Wiggins, Status: In Progress   Comment:    Guera Fernandez - 09 Feb 2017 11:15 AM     TASK CREATED  normal endometrial bx, how is she feeling? Yari Landis - 09 Feb 2017 3:25 PM     TASK IN PROGRESS   Yari Landis - 09 Feb 2017 3:29 PM     TASK REPLIED TO: Previously Assigned To [facility]  pt is still having pain    not quite as bad ,, anything I should advise her to do? 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)   4  Chronic low back pain (724 2,338 29) (M54 5,G89 29)   5  Complex ovarian cyst (620 2) (N83 299)   6  Cysts of both ovaries (620 2) (N83 201,N83 202)   7  Depressive disorder (311) (F32 9)   8  Diarrhea, unspecified type (787 91) (R19 7)   9  Endometrial thickening on ultra sound (793 5) (R93 8)   10  Lower abdominal tenderness (789 69) (R10 819)   11  Mastitis, acute (611 0) (N61 0)   12  Pap smear, as part of routine gynecological examination (V76 2) (Z01 419)   13  Pelvic pain (R10 2)   14  Severe persistent asthmatic bronchitis without complication (979 45) (Z73 31)    Current Meds   1  Doxycycline Monohydrate 100 MG Oral Capsule; TAKE 1 CAPSULE EVERY 12 HOURS   DAILY; Therapy: 09GOS0711 to (Evaluate:97Kvm0636)  Requested for: 54AGN0871; Last   Rx:57Pvl6165 Ordered   2  Multi-Vitamin Oral Tablet; TAKE 1 TABLET DAILY; Therapy: 57IWC7552 to Recorded   3  Sertraline HCl - 50 MG Oral Tablet; 1 AND 1/2 TABS DAILY; Therapy: (Fahad Ash) to  Requested for: 92GOX9725 Recorded   4   Super B Complex Maxi Oral Tablet; Therapy: (Recorded:22Yki9496) to Recorded   5  Vitamin D 1000 UNIT CAPS; TAKE 2 CAPSULE Daily; Therapy: 12UBQ3780 to Recorded    Allergies    1  Levaquin TABS   2   Penicillins    Signatures   Electronically signed by : Stalin Bernal, ; Feb 13 2017  3:08PM EST                       (Author)

## 2018-01-15 NOTE — PSYCH
Progress Note  Psychotherapy Provided [facility]: Individual Psychotherapy 45 minutes provided today  Goals addressed in session:   Goals: 1 & 2  D- Eliezer Hanley stated that she continues to deal with stress in the workplace  She stated that she has been making an effort to set firm boundaries as well as assert herself in her relationships with her coworkers and supervisor  Continuing to work on self care as well as use of coping mechanisms for stress  Giving supportive therapy  A- Progress - Continuing to process her emotions  P-Continue treatment       Pain Scale and Suicide Risk [facility]: Current Pain Assessment: no pain   On a scale of 0 to 10, the patient rates current pain at 0   Behavioral Health Treatment Plan [facility]: Diagnosis and Treatment Plan explained to patient, patient relates understanding diagnosis and is agreeable to Treatment Plan  Assessment    1  Anxiety (300 00) (F41 9)   2  Depressive disorder (311) (F32 9)   3   PTSD (post-traumatic stress disorder) (309 81) (F43 10)    Signatures   Electronically signed by : Jared Torres LCSW; Aug  3 2017 10:53AM EST                       (Author)

## 2018-01-15 NOTE — PSYCH
Progress Note  Psychotherapy Provided [facility]: Individual Psychotherapy 45 minutes provided today  Goals addressed in session:   Goals: 1  D- Thuy Rudd stated that she continues to struggle with the issues at work  In addition she is nearing the anniversary of her mother's death  PRocessing her emotions and discussing ways to cope with her emotions through this difficult time  Reviewing use of healthy coping mechanisms for stress as well as boundary setting in relationships  Giving supportive therapy  A- Progress- Continuing to process her emotions  P- COntinue treatment       Pain Scale and Suicide Risk [facility]: Current Pain Assessment: no pain   On a scale of 0 to 10, the patient rates current pain at 0   Behavioral Health Treatment Plan [facility]: Diagnosis and Treatment Plan explained to patient, patient relates understanding diagnosis and is agreeable to Treatment Plan  Assessment    1  Anxiety (300 00) (F41 9)   2   Depressive disorder (311) (F32 9)    Signatures   Electronically signed by : Taniya Patel LCSW; Jun 19 2017  1:01PM EST                       (Author)
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
## 2018-01-15 NOTE — PSYCH
Treatment Plan Tracking      #2 Treatment Plan not completed within required time limits due to: Client presented with emotional/behavioral issues that required clinical intervention          Signatures   Electronically signed by : Magdi Dhillon LCSW; Dec 20 2016  8:39AM EST                       (Author)

## 2018-01-16 NOTE — RESULT NOTES
Verified Results  MAMMO SCREENING BILATERAL W 3D & CAD 11Thb5024 08:03AM Orrin Castleman Order Number: [id number]     Test Name Result Flag Reference   MAMMO SCREENING BILATERAL W 3D & CAD (Report)     Patient History:   Patient is postmenopausal and is nulliparous  No known family history of cancer  Patient is an every day smoker  Patient's BMI is 28 7  Reason for exam: screening (asymptomatic)  Mammo Screening Bilateral W DBT and CAD: December 3, 2016 - Check   In #: [de-identified]   2D/3D Procedure   3D views: Bilateral MLO view(s) were taken  CC view(s) were    taken of the right breast    2D views: Bilateral XCCL view(s) were taken  CC view(s) were    taken of the left breast        Technologist: ADAMA Velazquez (R)(M)   Prior study comparison: April 4, 2015, digital bilateral    [facility] [address]  May 2, 2013, bilateral OPMA digital scrn    mammo w/CAD, performed at [address] [address]  There are scattered fibroglandular densities  A combination of    mediolateral oblique 3-D tomographic slices as well as standard    two-dimensional orthogonal images were obtained  No dominant soft tissue mass, architectural distortion or    suspicious calcifications are noted in either breast  The skin    and nipple contours are within normal limits  Stable benign    appearing well circumscribed nodule upper outer left breast    unchanged from prior exams  Stable calcifications noted  No evidence of malignancy  No significant changes    when compared with prior studies  ASSESSMENT: BiRad:2 - Benign     Recommendation:   Routine screening mammogram of both breasts in 1 year  A    reminder letter will be scheduled  8-10% of cancers will be missed on mammography  Management of a    palpable abnormality must be based on clinical grounds   Patients    will be notified of their results via letter from our facility  Accredited by [employer] of [employer]iology and FDA       Transcription Location: ADAMA Castillo 98: [id number]     Risk Value(s):   Tyrer-Cuzick 10 Year: 1 824%, Tyrer-Cuzick Lifetime: 9 130%,    Myriad Table: 1 5%, JAMAR 5 Year: 0 6%, NCI Lifetime: 6 2%   Signed by:   Jose Luis Leroy MD   12/5/16

## 2018-01-16 NOTE — PSYCH
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)   3  Depressive disorder (311) (F32 9)   4  Diaphragmatic hernia (553 3) (K44 9)   5  Meniscal injury (959 7) (S83 90XA)   6  Severe persistent asthmatic bronchitis without complication (188 49) (U48 073)   7  Sinusitis, bacterial (473 9,041 9) (J32 9,B96 89)    Current Meds   1  Cefuroxime Axetil 500 MG Oral Tablet (Ceftin); Take 1 tablet twice daily; Therapy: 36ISI8160 to (Bharat Zee)  Requested for: 73GEV1571; Last   Rx:17Mar2016 Ordered   2  ClonazePAM 0 5 MG Oral Tablet (KlonoPIN); TAKE 1 TABLET TWICE DAILY AS NEEDED; Therapy: 23VCG2512 to (Evaluate:16Apr2016); Last Rx:17Mar2016 Ordered   3  Meloxicam 15 MG Oral Tablet; TAKE 1 TABLET DAILY; Therapy: 83YRM4317 to (Evaluate:47Xlq6646)  Requested for: 45DIS7962; Last   WN:13IJL1096 Ordered   4  Multi-Vitamin Oral Tablet; TAKE 1 TABLET DAILY; Therapy: 99WON8747 to Recorded   5  Sertraline HCl - 50 MG Oral Tablet (Zoloft); Take 1 and 1/2 tabs daily; Therapy: 02GDZ3510 to (Last MH:47URN8636)  Requested for: 02Jun2015 Ordered   6  Vitamin D 1000 UNIT CAPS; TAKE 2 CAPSULE Daily; Therapy: 55GJL8913 to Recorded    Allergies    1  Levaquin TABS   2   Penicillins    Signatures   Electronically signed by : Evangelist Cottrell LCSW; Mar 22 2016 12:58PM EST                       (Author)

## 2018-01-16 NOTE — MISCELLANEOUS
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)   3  Chronic low back pain (724 2,338 29) (M54 5,G89 29)   4  Cysts of both ovaries (620 2) (N83 201,N83 202)   5  Depressive disorder (311) (F32 9)   6  Diarrhea, unspecified type (787 91) (R19 7)   7  Lower abdominal tenderness (789 69) (R10 819)   8  Mastitis, acute (611 0) (N61 0)   9  Pap smear, as part of routine gynecological examination (V76 2) (Z01 419)   10  Severe persistent asthmatic bronchitis without complication (874 69) (I76 19)    Current Meds   1  Cephalexin 500 MG Oral Capsule; TAKE 1 CAPSULE 3 TIMES DAILY UNTIL GONE;   Therapy: 40NMQ5592 to (Complete:31Jan2017)  Requested for: 62MBP4109; Last   MI:39KUM4568 Ordered   2  Multi-Vitamin Oral Tablet; TAKE 1 TABLET DAILY; Therapy: 46WQF3958 to Recorded   3  Sertraline HCl - 50 MG Oral Tablet; 1 AND 1/2 TABS DAILY; Therapy: (Елена Carbone) to  Requested for: 59ZZA3246 Recorded   4  Super B Complex Maxi Oral Tablet; Therapy: (Recorded:17Jan2017) to Recorded   5  Viberzi 75 MG Oral Tablet; one tab  bid; Therapy: 07BTA5798 to (Last Rx:17Jan2017) Ordered   6  Vitamin D 1000 UNIT CAPS; TAKE 2 CAPSULE Daily; Therapy: 11PIH1806 to Recorded    Allergies    1  Levaquin TABS   2   Penicillins    Signatures   Electronically signed by : Deepali Garcia, ; Jan 25 2017  2:01PM EST                       (Author)
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
## 2018-01-16 NOTE — PSYCH
Treatment Plan Tracking      #2 Treatment Plan not completed within required time limits due to: Client presented with emotional/behavioral issues that required clinical intervention          Signatures   Electronically signed by : Jared Torres LCSW; May 17 2017  5:41PM EST                       (Author)

## 2018-01-17 ENCOUNTER — GENERIC CONVERSION - ENCOUNTER (OUTPATIENT)
Dept: OTHER | Facility: OTHER | Age: 48
End: 2018-01-17
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
## 2018-01-17 NOTE — PROGRESS NOTES
Assessment    1  Anxiety (300 00) (F41 9)   2  Cysts of both ovaries (620 2) (N83 201,N83 202)    Plan  Anxiety    · Venlafaxine HCl ER 75 MG Oral Capsule Extended Release 24 Hour (Effexor XR); Take 1  capsule twice daily   · ClonazePAM 0 5 MG Oral Tablet (KlonoPIN); TAKE 1 TABLET TWICE DAILY AS NEEDED  Depressive disorder    · Sertraline HCl - 50 MG Oral Tablet    Discussion/Summary  Discussion Summary:   Will decrease the sertraline to 50 mg daily for 1week then 1/2 tab daily for 1 week then stop  Start effexor 75 mg daily for 1 week then increase to 2 daily  Counseling Documentation With Imm: The patient was counseled regarding diagnostic results, prognosis, risks and benefits of treatment options  total time of encounter was minutes and 30 minutes with anxiety issues minutes was spent counseling  Self Referrals:   Self Referrals: Yes      Chief Complaint  Chief Complaint Free Text Note Form: Pt here for f/u today  No refills needed  dk    Saw Dr Nancy Marshall --sl gyn since last here  History of Present Illness  Depression (Follow-Up): The patient states her depression has worsened since the last visit  They have had recurrent episodes of major depression  She describes this as moderate in severity  She is also being followed by a psychologist    Interval Symptoms: worsened depressed mood, worsened loss of interest or pleasure in activities, denies excessive sleepiness, stable trouble concentrating and improved anxiety  Associated symptoms include: no thoughts of suicide and no racing thoughts  HPI: 1  anxiety attacks-mother  of sepsis after hernia surgery- almost 2 year anniversary  She working at [employer] also has stress with 20 coworkers quitting in past year  Her stres-s loads are high  She doesn't want to get out of bed on weekends  Had chest tightness and stomach pain - seen by kenzie goodwin pa after nataly  and was given prn clonazepam- took 1/2 of 0 5 mg dose at bedtime which helped   Is seeing her counselor regularly-was crying recently on visit  We discussed switching the sertraline to effexor  will reorder the clonazepam for at bedtime  2  anhedonia- discussed restarting sewing projects  enjoys her cars etc  3  ovarian cyst- pelvic pain- s/p bx- improved after course of infection- seeing Dr Anshul Thompson- menopausal x 3 years- have repeat us done as ordered by her  2       Review of Systems  Complete-Female:   Constitutional: No fever, no chills, feels well, no tiredness, no recent weight gain or weight loss  Eyes: No complaints of eye pain, no red eyes, no eyesight problems, no discharge, no dry eyes, no itching of eyes  ENT: no complaints of earache, no loss of hearing, no nose bleeds, no nasal discharge, no sore throat, no hoarseness  Cardiovascular: No complaints of slow heart rate, no fast heart rate, no chest pain, no palpitations, no leg claudication, no lower extremity edema  Respiratory: No complaints of shortness of breath, no wheezing, no cough, no SOB on exertion, no orthopnea, no PND  Gastrointestinal: diarrhea and chronic diarrhea- tried vibysi- will try again, but No complaints of abdominal pain, no constipation, no nausea or vomiting, no diarrhea, no bloody stools  Genitourinary: No complaints of dysuria, no incontinence, no pelvic pain, no dysmenorrhea, no vaginal discharge or bleeding  Musculoskeletal: No complaints of arthralgias, no myalgias, no joint swelling or stiffness, no limb pain or swelling  Integumentary: No complaints of skin rash or lesions, no itching, no skin wounds, no breast pain or lump  Neurological: No complaints of headache, no confusion, no convulsions, no numbness, no dizziness or fainting, no tingling, no limb weakness, no difficulty walking  Psychiatric: Not suicidal, no sleep disturbance, no anxiety or depression, no change in personality, no emotional problems     Endocrine: No complaints of proptosis, no hot flashes, no muscle weakness, no deepening of the voice, no feelings of weakness  Hematologic/Lymphatic: No complaints of swollen glands, no swollen glands in the neck, does not bleed easily, does not bruise easily  Active Problems    1  Anxiety (300 00) (F41 9)   2  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)   3  Chronic low back pain (724 2,338 29) (M54 5,G89 29)   4  Complex ovarian cyst (620 2) (N83 299)   5  Cysts of both ovaries (620 2) (N83 201,N83 202)   6  Depressive disorder (311) (F32 9)   7  Endometrial thickening on ultra sound (793 5) (R93 8)   8  Pap smear, as part of routine gynecological examination (V76 2) (Z01 419)   9  Severe persistent asthmatic bronchitis without complication (036 03) (I67 28)    Past Medical History    1  Diarrhea (787 91) (R19 7)   2  History of Dizziness and giddiness (780 4) (R42)   3  History of Finger sprain (842 10) (S63 619A)   4  History of dysmenorrhea (V13 29) (Z87 42)   5  History of hypertension (V12 59) (Z86 79)   6  History of irritable bowel syndrome (V12 79) (Z87 19)   7  History of sinusitis (V12 69) (Z87 09)   8  History of skin disorder (V13 3) (Z87 2)   9  History of tinea corporis (V12 09) (Z86 19)   10  History of UTI (V13 02) (Z87 440)   11  History of Knee contusion (924 11) (S80 00XA)   12  History of Loose stools (787 7) (R19 5)   13  History of Lumbar back sprain (847 2) (S33 5XXA)   14  History of Menopausal symptoms (627 2) (N95 1)   15  History of Screening for colon cancer (V76 51) (Z12 11)   16  History of Tick bite (919 4,E906 4) (W57 XXXA)   17  History of Viral labyrinthitis (386 35) (H83 09)    Surgical History    1  History of Back Surgery   2  History of Cholecystectomy   3  History of Gallbladder Surgery   4  History of Knee Surgery   5  History of Surgery   6  History of Tonsillectomy    Family History  Mother    1  Family history of Bipolar affective disorder   2  Family history of Diverticulitis   3   Family history of coagulation disorder (V18 3) (Z83 2)   4  Family history of Suicidal ideation  Sibling    5  Family history of Aortic aneurysm  Sister    6  Family history of OCD (obsessive compulsive disorder)  Brother    7  Family history of Bipolar affective   8  Family history of substance abuse (V17 0) (Z81 4)   9  Family history of suicide attempt (V17 0) (Z81 8)  Family History    10  Family history of abdominal aortic aneurysm (V17 49) (Z82 49)   11  Family history of diabetes mellitus (V18 0) (Z83 3)  Family History Reviewed: The family history was reviewed and updated today  Social History    · Always uses seat belt   · Cultural background   · Current every day smoker (305 1) (F17 200)   · No drug use   · Non drinker / no alcohol use   · Primary language is English   · Racial background   · Single  Social History Reviewed: The social history was reviewed and updated today  Current Meds   1  Multi-Vitamin Oral Tablet; TAKE 1 TABLET DAILY; Therapy: 58OGJ6902 to Recorded   2  Sertraline HCl - 50 MG Oral Tablet; 1 AND 1/2 TABS DAILY; Therapy: (Radha Delacruz) to  Requested for: 88ZUL6903 Recorded   3  Super B Complex Maxi Oral Tablet; Therapy: (Recorded:48Shb1676) to Recorded   4  Vitamin D 1000 UNIT CAPS; TAKE 2 CAPSULE Daily; Therapy: 19RDC6527 to Recorded  Medication List Reviewed: The medication list was reviewed and updated today  Allergies    1  Levaquin TABS   2  Penicillins    Vitals  Vital Signs    Recorded: 61VNT0994 09:05AM   Heart Rate 72   Respiration 16   Systolic 794, LUE, Sitting   Diastolic 76, LUE, Sitting   Height 5 ft 3 in   Weight 172 lb    BMI Calculated 30 47   BSA Calculated 1 81     Physical Exam    Constitutional   General appearance: No acute distress, well appearing and well nourished  Ears, Nose, Mouth, and Throat   Otoscopic examination: Tympanic membranes translucent with normal light reflex  Canals patent without erythema      Nasal mucosa, septum, and turbinates: Normal without edema or erythema  Oropharynx: Normal with no erythema, edema, exudate or lesions  Pulmonary   Auscultation of lungs: Clear to auscultation  Cardiovascular   Examination of extremities for edema and/or varicosities: Normal     Abdomen   Abdomen: Non-tender, no masses  Musculoskeletal   Gait and station: Normal     Digits and nails: Normal without clubbing or cyanosis           Future Appointments    Date/Time Provider Specialty Site   03/13/2017 06:00 PM [facility]   04/10/2017 06:00 PM [facility]   05/08/2017 06:00 PM [facility]   Electronically signed by : Oscar Pereira, DO; Mar 10 2017  9:52AM EST                       (Author)

## 2018-01-17 NOTE — RESULT NOTES
Verified Results  *US BREAST LEFT LIMITED (DIAGNOSTIC) 85QPT0117 02:12PM Kal Robison Order Number: [id number]    - Patient Instructions: To schedule this appointment, please contact Central Scheduling at [phone]  Test Name Result Flag Reference   US BREAST LEFT LIMITED (Report)     Patient History:   Patient is postmenopausal and is nulliparous  No known family history of cancer  Patient is an every day smoker  Patient's BMI is 28 7  Reason for exam: clinical finding  History of bilateral mastoiditis  Improved pain and swelling    status post antibiotics  Patient has had spontaneous left nipple   discharge (white)     US Breast Left Limited: February 1, 2017 - Check In #: [de-identified]   Technologist: Maria D Muro RDMS   Prior study comparison: December 3, 2016, mammo screening    bilateral W DBT and CAD, performed at [facility]  April 4, 2015, digital bilateral screening    mammogram, performed at [facility]  May 2, 2013, bilateral OPMA digital scrn mammo w/CAD,    performed at [address] [facility]  Targeted ultrasound demonstrates no evidence of hypoechoic mass    or architectural distortion to suggest malignancy  White is a less suspicious characteristics of nipple discharge  In the absence of an identifiable lesion on ultrasound, clinical    follow-up is recommended  No suspicious hypoechoic mass or architectural    distortion to suggest malignancy  White is a less suspicious    characteristics of nipple discharge  In the absence of an    identifiable lesion on ultrasound, clinical follow-up is    recommended  ASSESSMENT: BiRad:1 - Negative     Recommendation:   Clinical management of the left breast    Routine screening mammogram of both breasts in 1 year       Transcription Location: [facility] 98: [id number]     Risk Value(s):   Justino 10 Year: 1 824%, Justino Lifetime: 9 130%,    Myriad Table: 1 5%, JAMAR 5 Year: 0 6%, NCI Lifetime: 6 2%   Signed by:   Xiomy Soriano MD   2/1/17

## 2018-01-17 NOTE — PROGRESS NOTES
Assessment    1  Encounter for preventive health examination (V70 0) (Z00 00)   2  Complex ovarian cyst (620 2) (N83 299)   3  Mild persistent asthma without complication (002 75) (N37 06)   4  PTSD (post-traumatic stress disorder) (309 81) (F43 10)   5  Chest pain at rest (786 50) (R07 9)   6  Tobacco abuse (305 1) (Z72 0)   7  Depression, major, recurrent (296 30) (F33 9)   8  Need for hepatitis C screening test (V73 89) (Z11 59)   9  Encounter for other screening for malignant neoplasm of breast (V76 19) (Z12 39)    Plan  Chest pain at rest    · ECHO COMPLETE WITH CONTRAST IF INDICATED; Status:Need Information -  Financial Authorization; Requested [id number];   Need for hepatitis C screening test    · (1) HEP C ANTIBODY; Status:Active; Requested for:26Oct2017;   Need for hepatitis C screening test, Screening for breast cancer    · * MAMMO SCREENING BILATERAL W CAD; Status:Hold For - Scheduling; Requested  for:26Oct2017;     Discussion/Summary  health maintenance visit Currently, she eats a healthy diet  cervical cancer screening is current Breast cancer screening: mammogram has been ordered  Colorectal cancer screening: the risks and benefits of colorectal cancer screening were discussed and colorectal cancer screening is not indicated  The risks and benefits of immunizations were discussed, immunizations are needed and tdap  Possible side effects of new medications were reviewed with the patient/guardian today  Chief Complaint  pt here today for hm , c/o stabing pain on chest x 4 days      History of Present Illness  HM, Adult Female: The patient is being seen for a health maintenance and has biometric appt [facility] at [facility] for lab evaluation  Social History: Work status: working full time and stress at work- refusing overtime due to this- down 10 people  The patient is a current cigarette smoker and 1ppd  She is ready to quit using tobacco  She reports never drinking alcohol  General Health:  The patient's health since the last visit is described as good  She has regular dental visits  Immunizations status: not up to date  Lifestyle:  She does not exercise regularly  stays active with car work and home projects  Reproductive health: the patient is postmenopausal   4 years post menopausal- hot flashes are improving- has some swelling in labia  Screening: cancer screening reviewed and current  Cervical cancer screening includes a pap smear performed jan 2017  Breast cancer screening includes a mammogram performed last year  Colorectal cancer screening includes no previous colonoscopy and to have colonoscopy at age 47  metabolic screening reviewed and updated  risk screening reviewed and current  Additional History:  will test for hep c    HPI: for health maintananxce visit- also reports some left sided chest pain- has slip for cxr and pft      Active Problems    1  Anxiety (300 00) (F41 9)   2  Complex ovarian cyst (620 2) (N83 299)   3  Depression, major, recurrent (296 30) (F33 9)   4  Dyspnea (786 09) (R06 00)   5  Mild persistent asthma without complication (675 88) (J03 12)   6   PTSD (post-traumatic stress disorder) (309 81) (F43 10)    Past Medical History    · Diarrhea (787 91) (R19 7)   · History of Dizziness and giddiness (780 4) (R42)   · History of Finger sprain (842 10) (P49 844B)   · History of dysmenorrhea (V13 29) (Z87 42)   · History of hypertension (V12 59) (Z86 79)   · History of irritable bowel syndrome (V12 79) (Z87 19)   · History of sinusitis (V12 69) (Z87 09)   · History of skin disorder (V13 3) (Z87 2)   · History of tinea corporis (V12 09) (Z86 19)   · History of UTI (V13 02) (Z87 440)   · History of Knee contusion (924 11) (S80 00XA)   · History of Loose stools (787 7) (R19 5)   · History of Lumbar back sprain (847 2) (S33 5XXA)   · History of Menopausal symptoms (627 2) (N95 1)   · History of Screening for colon cancer (V76 51) (Z12 11)   · History of Tick bite (919 4,E906 4) (W57 XXXA)   · History of Viral labyrinthitis (386 35) (H83 09)    Surgical History    · History of Back Surgery   · History of Cholecystectomy   · History of Gallbladder Surgery   · History of Knee Surgery   · History of Surgery   · History of Tonsillectomy    Family History  Mother    · Family history of Bipolar affective disorder   · Family history of Diverticulitis   · Family history of coagulation disorder (V18 3) (Z83 2)   · Family history of Suicidal ideation  Sibling    · Family history of Aortic aneurysm  Sister    · Family history of OCD (obsessive compulsive disorder)  Brother    · Family history of Bipolar affective   · Family history of substance abuse (V17 0) (Z81 4)   · Family history of suicide attempt (V17 0) (Z81 8)  Family History    · Family history of abdominal aortic aneurysm (V17 49) (Z82 49)   · Family history of diabetes mellitus (V18 0) (Z83 3)    Social History    · Always uses seat belt   · Cultural background   · non-   · Current every day smoker (305 1) (F17 200)   · No drug use   · Non drinker / no alcohol use   · Primary language is English   · Racial background   · white   · Single    Current Meds   1  Multi-Vitamin Oral Tablet; TAKE 1 TABLET DAILY; Therapy: 70IWX3271 to Recorded   2  Sertraline HCl - 100 MG Oral Tablet; TAKE 1 1/ 2 TABLETS  DAILY; Therapy: 09PGJ1674 to (025 915 057)  Requested for: 72ZHF3113; Last   Rx:11Oct2017 Ordered   3  Super B Complex Maxi Oral Tablet; Therapy: (Recorded:17Jan2017) to Recorded   4  Symbicort 160-4 5 MCG/ACT Inhalation Aerosol; INHALE 2 PUFFS Twice daily; Therapy: 91YKH0814 to (Last Rx:62Doe8317) Ordered   5  Ventolin  (90 Base) MCG/ACT Inhalation Aerosol Solution; INHALE 2 PUFFS BY   MOUTH ONCE EVERY 6 HOURS AS NEEDED; Therapy: 21ZCF6582 to [phone])  Requested for: 22Gtz8215; Last   Rx:[id number]; Status: ACTIVE - Transmit to Pharmacy - Awaiting Verification Ordered   6   Vitamin D 1000 UNIT CAPS; TAKE 2 CAPSULE Daily; Therapy: 09NNL0522 to Recorded    Allergies    1  Levaquin TABS   2  Penicillins    Vitals   Recorded: 61YQL8291 08:45AM   Temperature 98 4 F, Tympanic   Heart Rate 60   Systolic 909, LUE, Sitting   Diastolic 70, LUE, Sitting   Height 5 ft 3 in   Weight 168 lb 5 oz   BMI Calculated 29 82   BSA Calculated 1 8     Physical Exam    Constitutional   General appearance: No acute distress, well appearing and well nourished  Eyes   Pupils and irises: Equal, round, reactive to light  Ears, Nose, Mouth, and Throat   External inspection of ears and nose: Normal     Otoscopic examination: Tympanic membranes translucent with normal light reflex  Canals patent without erythema  Nasal mucosa, septum, and turbinates: Normal without edema or erythema  Oropharynx: Normal with no erythema, edema, exudate or lesions  Neck   Thyroid: Normal, no thyromegaly  Pulmonary   Percussion of chest: Normal     Auscultation of lungs: Clear to auscultation  Cardiovascular some costochondral tenderness  Auscultation of heart: Normal rate and rhythm, normal S1 and S2, no murmurs  Examination of extremities for edema and/or varicosities: Normal     Abdomen   Abdomen: Non-tender, no masses  Musculoskeletal   Gait and station: Normal     Skin   Skin and subcutaneous tissue: Normal without rashes or lesions  Future Appointments    Date/Time Provider Specialty Site   12/27/2017 08:30 AM WILLIAM Rowe   Internal Medicine [facility] INTERNAL MED   11/27/2017 06:00 PM Nakia Sol [facility]   12/20/2017 06:00 PM Nakia Sol [facility]   01/17/2018 05:00 PM Nakia Sol [facility]     Signatures   Electronically signed by : Rowena Treviño DO; Oct 26 2017  9:27AM EST                       (Author)
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
## 2018-01-22 ENCOUNTER — ALLSCRIPTS OFFICE VISIT (OUTPATIENT)
Dept: OTHER | Facility: OTHER | Age: 48
End: 2018-01-22

## 2018-01-22 VITALS
RESPIRATION RATE: 16 BRPM | HEART RATE: 72 BPM | SYSTOLIC BLOOD PRESSURE: 128 MMHG | DIASTOLIC BLOOD PRESSURE: 76 MMHG | BODY MASS INDEX: 30.48 KG/M2 | WEIGHT: 172 LBS | HEIGHT: 63 IN

## 2018-01-22 VITALS
TEMPERATURE: 98.4 F | HEART RATE: 60 BPM | BODY MASS INDEX: 29.82 KG/M2 | WEIGHT: 168.31 LBS | HEIGHT: 63 IN | SYSTOLIC BLOOD PRESSURE: 130 MMHG | DIASTOLIC BLOOD PRESSURE: 70 MMHG

## 2018-01-23 VITALS
SYSTOLIC BLOOD PRESSURE: 122 MMHG | HEART RATE: 96 BPM | DIASTOLIC BLOOD PRESSURE: 76 MMHG | BODY MASS INDEX: 29.95 KG/M2 | WEIGHT: 169 LBS | RESPIRATION RATE: 16 BRPM | HEIGHT: 63 IN

## 2018-01-23 NOTE — PSYCH
Treatment Plan Tracking      #2 Treatment Plan not completed within required time limits due to: Client presented with emotional/behavioral issues that required clinical intervention          Signatures   Electronically signed by : Maria R Bahena LCSW; Dec 27 2017  4:56PM EST                       (Author)

## 2018-01-23 NOTE — PSYCH
Treatment Plan Tracking      #2 Treatment Plan not completed within required time limits due to: Client cancelled/ no-showed scheduled appointment          Signatures   Electronically signed by : Ozzy Reyna LCSW; Jan 17 2018 11:46AM EST                       (Author)

## 2018-01-23 NOTE — RESULT NOTES
Verified Results  MAMMO SCREENING BILATERAL W 3D & CAD 19IBC4727 04:32PM Ingrid Borrero     Test Name Result Flag Reference   MAMMO SCREENING BILATERAL W 3D & CAD (Report)     Patient History:   Patient is postmenopausal and is nulliparous  No known family history of cancer  Patient is an every day smoker  Patient's BMI is 28 7  Reason for exam: screening, asymptomatic  Screening     Mammo Screening Bilateral W DBT and CAD: December 6, 2017 - Check   In #: [de-identified]   2D/3D Procedure   3D views: Bilateral MLO view(s) were taken  2D views: Bilateral CC view(s) were taken  Technologist: ADAMA Chavez (R)(M)   Prior study comparison: December 3, 2016, mammo screening    bilateral W DBT and CAD performed at [facility]  April 4, 2015, digital bilateral screening    mammogram performed at [facility]  May 2, 2013, bilateral OPMA digital scrn mammo w/CAD,    performed at [address] [facility]  December 23, 2011, left breast unilateral diagnostic mammogram,    performed at [facility]  December 14, 2011, digital bilateral screening mammogram performed at [facility]  July 9, 2010, digital    bilateral screening mammogram, performed at [facility]    May 20, 2009, bilateral OPMA digital scrn mammo w/CAD,    performed at [address] [facility]  The breast tissue is heterogeneously dense, potentially limiting    the sensitivity of mammography  Patient risk, included in this    report, assists in determining the appropriate screening regimen    (such as 3-D mammography or the inclusion of automated breast    ultrasound or MRI)  3-D mammography may also remain indicated as    screening   Identified on the right mediolateral oblique 3-D    image only, there is a 5 mm nodular asymmetry in the inferior    breast, 6 cm from the nipple  The patient should return for    diagnostic mammography, with possible ultrasound  The parenchymal pattern is otherwise stable  No dominant soft    tissue mass, architectural distortion or suspicious    calcifications are noted in either breast  The skin and nipple    contours are within normal limits  1  Inferior right breast 5 mm nodular asymmetry, seen on the    mediolateral oblique 3-D image only  Diagnostic mammography,    with possible ultrasound, recommended  2  Stable left mammogram      ACR BI-RADSï¾® Assessments: BiRad:0 - Incomplete: needs additional    imaging evaluation - Right     Recommendation:   Further imaging of the right breast    A breast health care nurse from our facility will be contacting    the patient regarding the need for additional imaging  The patient is scheduled in a reminder system for screening    mammography  8-10% of cancers will be missed on mammography  Management of a    palpable abnormality must be based on clinical grounds  Patients    will be notified of their results via letter from our facility  Accredited by [employer] of Rad[employer] and FDA       Transcription Location: ADAMA Castillo 98: [id number]     Risk Value(s):   Tyrer-Cuzick 10 Year: 1 900%, Tyrer-Cuzick Lifetime: 8 900%,    Myriad Table: 1 5%, JAMAR 5 Year: 0 6%, NCI Lifetime: 6 1%   Signed by:   Rj Interiano MD   12/7/17

## 2018-01-23 NOTE — PSYCH
Progress Note  Psychotherapy Provided [facility]: Individual Psychotherapy 45 minutes provided today  Goals addressed in session:   Goals: 1  D- Rekha Mace stated that work has become even more stressful than prior  She stated that the company has been declining and that there are going to be lay offs in the new year  She shared that no one is certain who will be laid off  In addition, she stated that she is uncertain what she will do if she loses her job  Processing her emotions and discussing her options if this occurs  Also discussing ways for her to cope with the increased stress at work  Giving supportive therapy  A- Progress - Continuing to process her emotions  P-Continue treatment       Pain Scale and Suicide Risk [facility]: Current Pain Assessment: no pain   On a scale of 0 to 10, the patient rates current pain at 0   Behavioral Health Treatment Plan [facility]: Diagnosis and Treatment Plan explained to patient, patient relates understanding diagnosis and is agreeable to Treatment Plan  Assessment    1  Anxiety (300 00) (F41 9)   2  Depression, major, recurrent (296 30) (F33 9)   3   PTSD (post-traumatic stress disorder) (309 81) (F43 10)    Signatures   Electronically signed by : Melinda Galan LCSW; Dec 27 2017  4:56PM EST                       (Author)

## 2018-01-23 NOTE — RESULT NOTES
Verified Results  *US BREAST RIGHT LIMITED (DIAGNOSTIC) [id number] 08:18AM Madan Griffith     Test Name Result Flag Reference   US BREAST RIGHT LIMITED (Report)     Patient History:   Patient is postmenopausal and is nulliparous  No known family history of cancer  Patient is an every day smoker  Patient's BMI is 28 7  Reason for exam: addl evaluation requested from abnormal    screening  48-year-old female with nodular density described in the right    breast on recent screening mammogram      Mammo Diagnostic Right W DBT and CAD: December 13, 2017 - Check    In #: [de-identified]   3D Procedure   3D views: CC and ML view(s) were taken of the right breast    2D Synthetic views: CC and ML view(s) were taken of the right    breast        Technologist: ADAMA Avalos (ADAMA)(M)   Prior study comparison: December 6, 2017, mammo screening    bilateral W DBT and CAD, performed at [facility]  December 3, 2016, mammo screening bilateral W   DBT and CAD, performed at [address] [place] [address]  April 4, 2015, digital bilateral screening mammogram,    performed at [address]  May    2, 2013, bilateral OPMA digital scrn mammo w/CAD, performed at    [address] [facility]  The breast tissue is heterogeneously dense, potentially limiting    the sensitivity of mammography  Patient risk, included in this    report, assists in determining the appropriate screening regimen    (such as 3-D mammography or the inclusion of automated breast    ultrasound or MRI)  3-D mammography may also remain indicated as    screening  US Breast Right Limited: December 13, 2017 - Check In #: [de-identified]   Standard views  Technologist: Rosalia Boyle RT (R), RDMS   Heterogeneity can be either focal or diffuse  The breast    echotexture is characterized by multiple small areas of increased   and decreased echogenicity   Shadowing may occur at the    interfaces of the fat lobules and parenchyma  This pattern occurs   in younger breasts and those with heterogeneously dense    parenchyma depicted mammographically  Examination of the 3-D craniocaudal and mediolateral views of the   right breast reveals persistence of a circumscribed 6 mm nodule    in the inferior medial right breast at approximately the 4    o'clock position, 5 cm from the nipple  A 2nd nodular asymmetry,   measuring 5 mm, is seen in the inferior posterior breast      The patient was taken to the ultrasound suite for further    evaluation  RIGHT BREAST ULTRASOUND:     Grayscale sonography of the right breast reveals the presence of    a well-defined hypoechoic nodule with posterior enhancement at    the 4 o'clock position, 4 cm from the nipple, measuring 0 5 x 0 3   x 0 4 cm  There is no internal vascularity  This corresponds    to the inferior medial nodule seen on mammography  At the 3 o'clock position, 5 cm from the nipple, a 2nd hypoechoic   nodule is identified, measuring 0 3 x 0 2 x 0 4 cm  Several scattered small subcentimeters cysts are seen at the 6    o'clock position, mid breast depth  IMPRESSION:   1  Inferior medial right breast 6 cm nodule persists with    corresponding probably benign finding on ultrasound  2  There is a 2nd 4 mm hypoechoic nodule in the medial right    breast, probable complicated cyst    3  Repeat right diagnostic mammogram and right breast ultrasound   in 6 months is recommended to document stability/resolution  ACR BI-RADSï¾® Assessments: BiRad:3 - Probably Benign (Overall)   Right breast Right Diag Mamm: BiRad:3 - probably benign finding    in the right breast    Right breast Right Brst US: BiRad:3 - probably benign finding in    the right breast      Recommendation:   Follow-up diagnostic mammogram and ultrasound of the right breast   in 6 months     The patient is scheduled in a reminder system for screening mammography  Transcription Location: ADAMA Castillo [address]: [id number]     Risk Value(s):   Tyrer-Cuzick 10 Year: 1 900%, Tyrer-Cuzick Lifetime: 8 900%,    Myriad Table: 1 5%, JAMAR 5 Year: 0 6%, NCI Lifetime: 6 1%   Patient History:   Patient is postmenopausal and is nulliparous  No known family history of cancer  Patient is an every day smoker  Patient's BMI is 28 7  Reason for exam: addl evaluation requested from abnormal    screening  58-year-old female with nodular density described in the right    breast on recent screening mammogram      Mammo Diagnostic Right W DBT and CAD: December 13, 2017 - Check    In #: [de-identified]   3D Procedure   3D views: CC and ML view(s) were taken of the right breast    2D Synthetic views: CC and ML view(s) were taken of the right    breast        Technologist: ADAMA Arredondo (R)(M)   Prior study comparison: December 6, 2017, mammo screening    bilateral W DBT and CAD, performed at [facility]  December 3, 2016, mammo screening bilateral W   DBT and CAD, performed at [address] [place] [address]  April 4, 2015, digital bilateral screening mammogram,    performed at [facility]  May    2, 2013, bilateral OPMA digital scrn mammo w/CAD, performed at    [address] [facility]  The breast tissue is heterogeneously dense, potentially limiting    the sensitivity of mammography  Patient risk, included in this    report, assists in determining the appropriate screening regimen    (such as 3-D mammography or the inclusion of automated breast    ultrasound or MRI)  3-D mammography may also remain indicated as    screening  US Breast Right Limited: December 13, 2017 - Check In #: [de-identified]   Standard views  Technologist: Stacey Orozco RT (R), RDMS   Heterogeneity can be either focal or diffuse   The breast    echotexture is characterized by multiple small areas of increased   and decreased echogenicity  Shadowing may occur at the    interfaces of the fat lobules and parenchyma  This pattern occurs   in younger breasts and those with heterogeneously dense    parenchyma depicted mammographically  Examination of the 3-D    craniocaudal and mediolateral views of the right breast reveals    persistence of a circumscribed 6 mm nodule in the inferior medial   right breast at approximately the 4 o'clock position, 5 cm from    the nipple  A 2nd nodular asymmetry, measuring 5 mm, is seen in    the inferior posterior breast      The patient was taken to the ultrasound suite for further    evaluation  RIGHT BREAST ULTRASOUND:     Grayscale sonography of the right breast reveals the presence of    a well-defined hypoechoic nodule with posterior enhancement at    the 4 o'clock position, 4 cm from the nipple, measuring 0 5 x 0 3   x 0 4 cm  There is no internal vascularity  This corresponds    to the inferior medial nodule seen on mammography  At the 3 o'clock position, 5 cm from the nipple, a 2nd hypoechoic   nodule is identified, measuring 0 3 x 0 2 x 0 4 cm  Several scattered small subcentimeters cysts are seen at the 6    o'clock position, mid breast depth  1  Inferior medial right breast 6 cm nodule persists with    corresponding probably benign finding on ultrasound  2  There is a 2nd 4 mm hypoechoic nodule in the medial right    breast, probable complicated cyst    3  Repeat right diagnostic mammogram and right breast ultrasound   in 6 months is recommended to document stability/resolution  ACR BI-RADSï¾® Assessments: BiRad:3 - Probably Benign (Overall)   Right breast Right Diag Mamm: BiRad:3 - probably benign finding    in the right breast    Right breast Right Brst US: BiRad:3 - probably benign finding in    the right breast      Recommendation:   Follow-up diagnostic mammogram and ultrasound of the right breast   in 6 months     The patient is scheduled in a reminder system for screening    mammography       Transcription Location: ADAMA Castillo 98: [id number]     Risk Value(s):   Tyrer-Cuzick 10 Year: 1 900%, Tyrer-Cuzick Lifetime: 8 900%,    Myriad Table: 1 5%, JAMAR 5 Year: 0 6%, NCI Lifetime: 6 1%   Signed by:   Jamel Madrid MD   12/13/17

## 2018-01-24 NOTE — PSYCH
Date of Initial Treatment Plan: 6/8/16  Date of Current Treatment Plan: 1/22/18  Strengths/Personal Resources for Self Care: [place], hard working, ambitious, good leader, creative, perceptive  Diagnosis:   Axis I: Anxiety, Deoression     Area of Needs: Work  Loss of mom  Long Term Goals:   Be able to cope with my job loss and move on with my life   Target Date: 5/22/18      Be able to work through the periods of missing her   Target Date: 5/22/18         Short Term Objectives:   Goal 1:   Seek a healthy work environment  Be able to bring closure to my job at [employer]  Be able to cope with and process the difficulties and unfairness that I experienced there  Target Date: 5/22/18      Goal 2:   know that it's OK to cry  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  Reflect on the positive aspects of our relationship rather than the negative  Target Date: 5/22/18          GOAL 1: Modality: Individual 1-2 x per month Target Date: 5/22/18       The person(s) responsible for carrying out the plan is Ami  GOAL 2: Modality: Individual 1-2 x per month Target Date: 5/22/18       The person(s) responsible for carrying out the plan is Ami  The first scheduled review date is 5/22/18                 Patient Signature: _________________________________ Date/Time: ______________       Electronically signed by : Marcin Dale, [facility]; Jan 23 2018  5:38PM EST                       (Author)

## 2018-01-24 NOTE — PSYCH
Progress Note  Psychotherapy Provided [facility]: Individual Psychotherapy 45 minutes provided today  Goals addressed in session:   Goals: 1 & 2  D- Valentina Lora stated that she has been laid off from her job and that her last day of work is January 31st  In addition to this the environment at work remains unhealthy and stressful  She has also been struggling with health issues including a lesion on her liver and has been diagnosed in the beginning stages of COPD  Processing her emotions and discussing ways for her to cope with her job loss as well as her health issues  Also discussing seeking a healthier environment with new employment  Reviewing and revising her treatment plan  Giving supportive therapy  A- Progress - Continuing to process her emotions  p-Continue treatment       Pain Scale and Suicide Risk [facility]: Current Pain Assessment: moderate to severe   On a scale of 0 to 10, the patient rates current pain at 6   Behavioral Health Treatment Plan [facility]: Diagnosis and Treatment Plan explained to patient, patient relates understanding diagnosis and is agreeable to Treatment Plan  Assessment    1  Anxiety (300 00) (F41 9)   2  Depression, major, recurrent (296 30) (F33 9)   3   PTSD (post-traumatic stress disorder) (309 81) (F43 10)    Signatures   Electronically signed by : Janine Mendez LCSW; Jan 23 2018  5:44PM EST                       (Author)

## 2018-02-14 ENCOUNTER — TELEPHONE (OUTPATIENT)
Dept: OBGYN CLINIC | Facility: CLINIC | Age: 48
End: 2018-02-14

## 2018-02-14 ENCOUNTER — OFFICE VISIT (OUTPATIENT)
Dept: BEHAVIORAL/MENTAL HEALTH CLINIC | Facility: CLINIC | Age: 48
End: 2018-02-14
Payer: COMMERCIAL

## 2018-02-14 DIAGNOSIS — F33.9 EPISODE OF RECURRENT MAJOR DEPRESSIVE DISORDER, UNSPECIFIED DEPRESSION EPISODE SEVERITY (HCC): ICD-10-CM

## 2018-02-14 DIAGNOSIS — F41.9 ANXIETY: Primary | ICD-10-CM

## 2018-02-14 DIAGNOSIS — F43.10 POSTTRAUMATIC STRESS DISORDER: ICD-10-CM

## 2018-02-14 PROCEDURE — 90834 PSYTX W PT 45 MINUTES: CPT | Performed by: SOCIAL WORKER

## 2018-02-14 NOTE — TELEPHONE ENCOUNTER
Patient called c/o pelvic pain is back, no bleeding, no fever  Patient did not have follow up US as advised 10 months ago  Advised needs US and Ov  Patient will schedule  Advised take Ibuprofen or Aleve, call back if worsens

## 2018-02-14 NOTE — PSYCH
Psychotherapy Provided: Individual Psychotherapy 45 minutes     Length of time in session: 45 minutes, follow up in 3 week    Goals addressed in session: Goal 1     Pain:      moderate to severe    4    Current suicide risk : Low     D- Leonel Schlatter stated that she has been struggling with her emotions  She shared that she is happy that she is no longer at her job but has been struggling with a lack of motivation and depression  She denies Si  She stated that relatives are pressuring her about getting another job, but that she doesn't feel ready to do so  She stated that she feels "drained" and is also struggling with her health issues  Processing her emotions and discussing ways for her to cope with the changes in her life  Discussing increasing self care and use of her support system  Giving supportive therapy  A- Progress - Continuing to process her emotions  P-Continue treatment    [address]: Diagnosis and Treatment Plan explained to Grace Schroeder relates understanding diagnosis and is agreeable to Treatment Plan   Yes

## 2018-02-20 RX ORDER — BIOTIN 1 MG
2 TABLET ORAL DAILY
COMMUNITY
Start: 2015-12-04

## 2018-02-20 RX ORDER — ALBUTEROL SULFATE 90 UG/1
2 AEROSOL, METERED RESPIRATORY (INHALATION)
COMMUNITY
Start: 2017-05-04 | End: 2018-02-23

## 2018-02-20 RX ORDER — SERTRALINE HYDROCHLORIDE 100 MG/1
TABLET, FILM COATED ORAL
COMMUNITY
Start: 2017-09-05 | End: 2018-03-06 | Stop reason: SDUPTHER

## 2018-02-20 RX ORDER — BUDESONIDE AND FORMOTEROL FUMARATE DIHYDRATE 160; 4.5 UG/1; UG/1
2 AEROSOL RESPIRATORY (INHALATION) 2 TIMES DAILY
COMMUNITY
Start: 2017-09-27 | End: 2018-02-23

## 2018-02-23 ENCOUNTER — TELEPHONE (OUTPATIENT)
Dept: PULMONOLOGY | Facility: CLINIC | Age: 48
End: 2018-02-23

## 2018-02-23 ENCOUNTER — OFFICE VISIT (OUTPATIENT)
Dept: PULMONOLOGY | Facility: HOSPITAL | Age: 48
End: 2018-02-23
Payer: COMMERCIAL

## 2018-02-23 VITALS
HEART RATE: 83 BPM | BODY MASS INDEX: 30.3 KG/M2 | HEIGHT: 63 IN | DIASTOLIC BLOOD PRESSURE: 82 MMHG | WEIGHT: 171 LBS | OXYGEN SATURATION: 97 % | TEMPERATURE: 98.3 F | SYSTOLIC BLOOD PRESSURE: 118 MMHG

## 2018-02-23 DIAGNOSIS — Z72.0 TOBACCO ABUSE: ICD-10-CM

## 2018-02-23 DIAGNOSIS — J43.8 OTHER EMPHYSEMA (HCC): Primary | ICD-10-CM

## 2018-02-23 DIAGNOSIS — J45.30 MILD PERSISTENT ASTHMA WITHOUT COMPLICATION: ICD-10-CM

## 2018-02-23 DIAGNOSIS — K76.9 LIVER LESION: ICD-10-CM

## 2018-02-23 PROBLEM — N83.299 COMPLEX OVARIAN CYST: Status: ACTIVE | Noted: 2017-02-03

## 2018-02-23 PROBLEM — R07.9 CHEST PAIN AT REST: Status: ACTIVE | Noted: 2017-10-26

## 2018-02-23 PROBLEM — R06.00 DYSPNEA: Status: ACTIVE | Noted: 2017-09-05

## 2018-02-23 PROCEDURE — 99205 OFFICE O/P NEW HI 60 MIN: CPT | Performed by: INTERNAL MEDICINE

## 2018-02-23 RX ORDER — ALBUTEROL SULFATE 90 UG/1
2 AEROSOL, METERED RESPIRATORY (INHALATION) EVERY 6 HOURS PRN
Qty: 1 INHALER | Refills: 6 | Status: SHIPPED | OUTPATIENT
Start: 2018-02-23

## 2018-02-23 NOTE — ASSESSMENT & PLAN NOTE
I am unsure of the diagnosis of asthma  I think that the majority of her symptoms are related to smoking/emphysema  We will do PFTs with methacholine challenge to determine if there is a component of asthma  She will hold off on Advair until after the testing

## 2018-02-23 NOTE — TELEPHONE ENCOUNTER
Leroy Peng from [facility] is calling to get clarification on Advair   Please call her back [phone] opt#1

## 2018-02-23 NOTE — PROGRESS NOTES
Assessment:    Liver lesion  On CT chest there was a 3 5 x 2 0 cm liver lesion and ultrasound was recommended  I was able to look back at abdominal ultrasound from Jan 2017  Ultrasound reading:  And echogenic mass in the dome of the liver measuring 4 1 cm is not significantly changed from 2006 and may be considered benign  No new or suspicious hepatic mass identified    No further testing is needed at this time    Mild persistent asthma without complication  I am unsure of the diagnosis of asthma  I think that the majority of her symptoms are related to smoking/emphysema  We will do PFTs with methacholine challenge to determine if there is a component of asthma  She will hold off on Advair until after the testing        Other emphysema ([address] )  After PFTs she will start Advair (Symbicort not covered by her insurance) 1 puff BID  I strongly encouraged her to stop smoking  She will increase her activity/exercise as tolerate  I recommended that she get a flu shot    Tobacco abuse  She is planning on going to a hypnotist to help with cessation      Plan:    Diagnoses and all orders for this visit:    Other emphysema ([address] )  -     fluticasone-salmeterol (ADVAIR) 250-50 mcg/dose inhaler; Inhale 1 puff every 12 (twelve) hours  -     albuterol (PROAIR HFA) 90 mcg/act inhaler; Inhale 2 puffs every 6 (six) hours as needed for wheezing or shortness of breath    Mild persistent asthma without complication  -     Methacholine challenge; Future    Tobacco abuse    Liver lesion    Other orders  -     Multiple Vitamin (MULTI-VITAMIN DAILY PO); Take 1 tablet by mouth daily  -     sertraline (ZOLOFT) 100 mg tablet; Take by mouth  -     B Complex-Folic Acid (SUPER B COMPLEX MAXI) TABS; Take by mouth  -     Discontinue: budesonide-formoterol (SYMBICORT) 160-4 5 mcg/act inhaler; Inhale 2 puffs 2 (two) times a day  -     Discontinue: albuterol (VENTOLIN HFA) 90 mcg/act inhaler;  Inhale 2 puffs  -     Cholecalciferol (VITAMIN D3) 1000 units CAPS; Take 2 capsules by mouth daily        Follow-up: 2 months      HPI:  The patient is a 49year old smoker who presents with almost 18 months of breathing issues  She says she first noticed it Oct 2016 with some mild dyspnea on exertion  Then over the next several months her symptoms seemed to worsen  She gets dyspnea on exertion with climbing a flight of stairs or carrying heavy objects  She has some associated chest pain under her left breast that she describes as a stabbing pain  She has a chronic cough that is occasionally productive of mucous  She has wheezing, that is worst at night  She has been told in the past that she has asthma and was started on Symbicort that helped but now she hasn't used it in 3-4 months  She never picked up or used a rescue inhaler  In June of last year she developed significant coughing spells that caused severe left chest pain that would inhibit her breathing  She was found to have old rib fractures on the left on most recent imaging and she feels that is the only explanation  Review of Systems   Constitutional: Positive for unexpected weight change  Negative for diaphoresis, fatigue and fever  HENT: Positive for congestion, postnasal drip and sinus pressure  Negative for hearing loss, sore throat and voice change  Eyes: Positive for visual disturbance  Respiratory: Positive for cough, shortness of breath and wheezing  Negative for apnea and chest tightness  Cardiovascular: Positive for chest pain  Negative for palpitations and leg swelling  Gastrointestinal: Negative for abdominal distention, abdominal pain, blood in stool, constipation, diarrhea, nausea and vomiting  Endocrine: Negative for polyuria  Genitourinary: Negative for dysuria  Musculoskeletal: Positive for arthralgias  Skin: Negative for rash  Allergic/Immunologic: Negative for environmental allergies  Neurological: Positive for headaches   Negative for dizziness, tremors and numbness  Hematological: Does not bruise/bleed easily  Psychiatric/Behavioral: Positive for dysphoric mood  Negative for sleep disturbance  The patient is not nervous/anxious  Historical Information   Past Medical History:   Diagnosis Date    Depression, major, recurrent ([facility] ) 2/14/2018    Dysmenorrhea     Hypertension     IBS (irritable bowel syndrome)     Liver lesion 1/9/2018    Menopausal symptoms     Other emphysema ([facility] ) 2/23/2018    PTSD (post-traumatic stress disorder) 2/14/2018    Skin disorder     Tick bite      Past Surgical History:   Procedure Laterality Date    CHOLECYSTECTOMY      KNEE SURGERY      MICRODISCECTOMY LUMBAR  2003    TONSILLECTOMY  1986     Social History   History   Alcohol Use No     History   Smoking Status    Current Every Day Smoker    Packs/day: 1 00    Years: 30 00   Smokeless Tobacco    Never Used       Occupational history:   at [employer] - currently on a layoff    Family History:   Family History   Problem Relation Age of Onset    Bipolar disorder Mother     Diverticulitis Mother     Clotting disorder Mother      coagulation disorder; after hernia surg and had multiorgan failure- at [employer] age 71    Mental illness Mother      suicidal ideation   Nevaeh Courser OCD Sister     Bipolar disorder Brother      affective    Substance Abuse Brother     Suicide Attempts Brother     Aortic aneurysm Other     Aortic aneurysm Family      abdominal    Diabetes Family     COPD Maternal Grandfather        Medications: The patient's active and prehospital medications were reviewed  VITALS:  Vitals:    02/23/18 0801   BP: 118/82   BP Location: Left arm   Patient Position: Sitting   Pulse: 83   Temp: 98 3 °F (36 8 °C)   SpO2: 97%   Weight: 77 6 kg (171 lb)   Height: 5' 3" (1 6 m)       Physical Exam   Constitutional: She is oriented to person, place, and time  She appears well-developed and well-nourished  No distress     HENT:   Head: Normocephalic and atraumatic  Right Ear: External ear normal    Left Ear: External ear normal    Nose: Nose normal    Mouth/Throat: Oropharynx is clear and moist  No oropharyngeal exudate  Eyes: Conjunctivae are normal  Pupils are equal, round, and reactive to light  Right eye exhibits no discharge  Left eye exhibits no discharge  No scleral icterus  Neck: Normal range of motion  Neck supple  No JVD present  No tracheal deviation present  Cardiovascular: Normal rate, regular rhythm and normal heart sounds  Exam reveals no gallop and no friction rub  No murmur heard  Pulmonary/Chest: Effort normal and breath sounds normal  No stridor  No respiratory distress  She has no wheezes  She has no rales  She exhibits no tenderness  Abdominal: Soft  Bowel sounds are normal  She exhibits no distension  There is no tenderness  There is no rebound and no guarding  Musculoskeletal: Normal range of motion  She exhibits no edema or deformity  Neurological: She is alert and oriented to person, place, and time  No cranial nerve deficit  Skin: Skin is warm and dry  No rash noted  She is not diaphoretic  No erythema  Psychiatric: She has a normal mood and affect  Her behavior is normal    Vitals reviewed        PFT results:  Office Spirometry Results:  FEV1: 2 93 liters  FVC: 3 59 liters  FEV1/FVC: 81 6 %      Diagnostic Data:  CBC:   Lab Results   Component Value Date    WBC 10 1 01/24/2017    HGB 14 4 01/24/2017    HCT 44 2 01/24/2017    MCV 94 5 01/24/2017     01/24/2017         CMP:   Lab Results   Component Value Date     01/24/2017    K 4 2 01/24/2017     01/24/2017    CO2 26 01/24/2017    BUN 10 01/24/2017    CREATININE 0 87 01/24/2017    CALCIUM 9 5 01/24/2017    AST 16 01/24/2017    ALT 15 01/24/2017    ALKPHOS 92 01/24/2017    PROT 6 6 01/24/2017    BILITOT 0 3 01/24/2017         Microbiology:        ABG: No results found for: PHART, QJA2XVW, PO2ART, XWC2YLW, B7RSDBOP, BEART, SOURCE    Imaging:  CT chest, personally viewed on CT, Feb 2018:  Small RUL bullous lesions, mild emphysema RUL      Justin Orr DO

## 2018-02-23 NOTE — ASSESSMENT & PLAN NOTE
On CT chest there was a 3 5 x 2 0 cm liver lesion and ultrasound was recommended  I was able to look back at abdominal ultrasound from Jan 2017  Ultrasound reading:  And echogenic mass in the dome of the liver measuring 4 1 cm is not significantly changed from 2006 and may be considered benign    No new or suspicious hepatic mass identified    No further testing is needed at this time

## 2018-02-23 NOTE — TELEPHONE ENCOUNTER
A verbal approval was given to alpesh from [address] done for patient to be switched from advair to the air duo 113/14 dispense 3 months at a time

## 2018-02-23 NOTE — ASSESSMENT & PLAN NOTE
After PFTs she will start Advair (Symbicort not covered by her insurance) 1 puff BID  I strongly encouraged her to stop smoking  She will increase her activity/exercise as tolerate  I recommended that she get a flu shot

## 2018-02-26 ENCOUNTER — ULTRASOUND (OUTPATIENT)
Dept: OBGYN CLINIC | Facility: CLINIC | Age: 48
End: 2018-02-26

## 2018-02-26 ENCOUNTER — OFFICE VISIT (OUTPATIENT)
Dept: OBGYN CLINIC | Facility: CLINIC | Age: 48
End: 2018-02-26

## 2018-02-26 VITALS
SYSTOLIC BLOOD PRESSURE: 118 MMHG | HEIGHT: 63 IN | WEIGHT: 172.2 LBS | BODY MASS INDEX: 30.51 KG/M2 | DIASTOLIC BLOOD PRESSURE: 68 MMHG

## 2018-02-26 DIAGNOSIS — K58.0 IRRITABLE BOWEL SYNDROME WITH DIARRHEA: ICD-10-CM

## 2018-02-26 DIAGNOSIS — R10.2 PELVIC PAIN IN FEMALE: Primary | ICD-10-CM

## 2018-02-26 DIAGNOSIS — N83.299 COMPLEX OVARIAN CYST: ICD-10-CM

## 2018-02-26 PROCEDURE — 99214 OFFICE O/P EST MOD 30 MIN: CPT | Performed by: OBSTETRICS & GYNECOLOGY

## 2018-02-26 PROCEDURE — 76830 TRANSVAGINAL US NON-OB: CPT

## 2018-02-26 NOTE — PROGRESS NOTES
AMB US Pelvic Non OB  Date/Time: 2/26/2018 7:42 AM  Performed by: Shima Duval by: Jose Del Rosario     Procedure details:     Indications: ovarian cysts      Technique:  Transvaginal US, Non-OB    Position: lithotomy exam    Uterine findings:     Diameter (mm):  19    Length (mm):  46    Width (mm):  31    Myomas: identified      Endometrium thickness (mm):  1 8  Left ovary findings:     Left ovary:  Visualized    Cysts: not identified      Diameter (mm):  9 9    Length (mm):  16 1    Width (mm):  12  Right ovary findings:     Right ovary:  Visualized    Cysts: not identified      Diameter (mm):  9 6    Length (mm):  16 3    Width (mm):  12 9  Other findings:     Free pelvic fluid: not identified      Free peritoneal fluid: not identified    Post-Procedure Details:     Impression:  Anteverted uterus demonstrates a posterior fundal fibroid, 8mm, stable  Bilateral ovaries appear within normal limits  Previously noted cyst is not identified  Tolerance:   Tolerated well, no immediate complications    Complications: no complications

## 2018-02-26 NOTE — PROGRESS NOTES
Assessment/Plan:      Diagnoses and all orders for this visit:    Pelvic pain in female  Comments:  Pelvic exam within normal limits, most likely irritable bowel verses transient bladder infection  Irritable bowel syndrome with diarrhea          Subjective:     Patient ID: Alan Holguin is a 49 y o  female  Charna Marsh is seen today with a month history of lower pelvic cramping  The patient stated that since the initial onset of this, it has gradually diminished and she is feeling quite well today  She denied during this time any fever, chills, nausea or vomiting  She has no abnormal bleeding, discharge or bladder symptoms  She does have IBS, and normally has diarrhea off and on  Apparently however the symptoms have not worsened during this time  She did have a pelvic ultrasound today which was well within normal limits  Review of Systems   Constitutional: Negative  Negative for chills and fever  Respiratory: Negative  Cardiovascular: Negative  Gastrointestinal: Positive for diarrhea  Negative for abdominal pain, blood in stool, constipation and nausea  Genitourinary: Positive for pelvic pain  Negative for difficulty urinating, dysuria, flank pain, hematuria and urgency  Skin: Negative  Negative for rash and wound  Neurological: Negative  Objective:     Physical Exam   Constitutional: She is oriented to person, place, and time  She appears well-developed and well-nourished  Genitourinary: Vagina normal and uterus normal  There is no rash, tenderness or lesion on the right labia  There is no rash, tenderness or lesion on the left labia  Cervix exhibits no motion tenderness, no discharge and no friability  Right adnexum displays no mass, no tenderness and no fullness  Left adnexum displays no mass, no tenderness and no fullness  No vaginal discharge found  Neurological: She is alert and oriented to person, place, and time

## 2018-03-03 ENCOUNTER — HOSPITAL ENCOUNTER (OUTPATIENT)
Dept: PULMONOLOGY | Facility: HOSPITAL | Age: 48
Discharge: HOME | End: 2018-03-03
Attending: INTERNAL MEDICINE
Payer: COMMERCIAL

## 2018-03-03 DIAGNOSIS — J45.30 MILD PERSISTENT ASTHMA WITHOUT COMPLICATION: ICD-10-CM

## 2018-03-03 PROCEDURE — 94729 DIFFUSING CAPACITY: CPT | Performed by: INTERNAL MEDICINE

## 2018-03-03 PROCEDURE — 94729 DIFFUSING CAPACITY: CPT

## 2018-03-03 PROCEDURE — 94726 PLETHYSMOGRAPHY LUNG VOLUMES: CPT

## 2018-03-03 PROCEDURE — 94760 N-INVAS EAR/PLS OXIMETRY 1: CPT

## 2018-03-03 PROCEDURE — 94070 EVALUATION OF WHEEZING: CPT | Performed by: INTERNAL MEDICINE

## 2018-03-03 PROCEDURE — 94070 EVALUATION OF WHEEZING: CPT

## 2018-03-03 PROCEDURE — 94726 PLETHYSMOGRAPHY LUNG VOLUMES: CPT | Performed by: INTERNAL MEDICINE

## 2018-03-03 RX ORDER — SODIUM CHLORIDE FOR INHALATION 0.9 %
3 VIAL, NEBULIZER (ML) INHALATION ONCE AS NEEDED
Status: DISCONTINUED | OUTPATIENT
Start: 2018-03-03 | End: 2018-03-07 | Stop reason: HOSPADM

## 2018-03-03 RX ORDER — ALBUTEROL SULFATE 2.5 MG/3ML
2.5 SOLUTION RESPIRATORY (INHALATION) ONCE AS NEEDED
Status: DISCONTINUED | OUTPATIENT
Start: 2018-03-03 | End: 2018-03-07 | Stop reason: HOSPADM

## 2018-03-05 ENCOUNTER — OFFICE VISIT (OUTPATIENT)
Dept: BEHAVIORAL/MENTAL HEALTH CLINIC | Facility: CLINIC | Age: 48
End: 2018-03-05
Payer: COMMERCIAL

## 2018-03-05 DIAGNOSIS — F33.9 EPISODE OF RECURRENT MAJOR DEPRESSIVE DISORDER, UNSPECIFIED DEPRESSION EPISODE SEVERITY (HCC): ICD-10-CM

## 2018-03-05 DIAGNOSIS — F43.10 PTSD (POST-TRAUMATIC STRESS DISORDER): ICD-10-CM

## 2018-03-05 DIAGNOSIS — F41.9 ANXIETY: Primary | ICD-10-CM

## 2018-03-05 PROCEDURE — 90834 PSYTX W PT 45 MINUTES: CPT | Performed by: SOCIAL WORKER

## 2018-03-06 ENCOUNTER — OFFICE VISIT (OUTPATIENT)
Dept: PSYCHIATRY | Facility: CLINIC | Age: 48
End: 2018-03-06
Payer: COMMERCIAL

## 2018-03-06 VITALS — WEIGHT: 173 LBS | BODY MASS INDEX: 30.65 KG/M2

## 2018-03-06 DIAGNOSIS — F33.1 MDD (MAJOR DEPRESSIVE DISORDER), RECURRENT EPISODE, MODERATE (HCC): ICD-10-CM

## 2018-03-06 DIAGNOSIS — F41.0 PANIC ATTACKS: ICD-10-CM

## 2018-03-06 DIAGNOSIS — F41.9 ANXIETY: ICD-10-CM

## 2018-03-06 DIAGNOSIS — F43.10 PTSD (POST-TRAUMATIC STRESS DISORDER): Primary | ICD-10-CM

## 2018-03-06 PROCEDURE — 90792 PSYCH DIAG EVAL W/MED SRVCS: CPT | Performed by: PSYCHIATRY & NEUROLOGY

## 2018-03-06 RX ORDER — SERTRALINE HYDROCHLORIDE 100 MG/1
100 TABLET, FILM COATED ORAL DAILY
Qty: 90 TABLET | Refills: 1 | Status: SHIPPED | OUTPATIENT
Start: 2018-03-06 | End: 2018-05-15 | Stop reason: SDUPTHER
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
## 2018-03-13 ENCOUNTER — TELEPHONE (OUTPATIENT)
Dept: PULMONOLOGY | Facility: CLINIC | Age: 48
End: 2018-03-13

## 2018-03-13 NOTE — TELEPHONE ENCOUNTER
Results called to [facility]  All questions answered  She will continue her ICS/LABA inhaler as it is providing benefit  She will follow-up with Dr Asim Lira as previously scheduled  She is aware that she should call the office with any further questions or concerns

## 2018-03-13 NOTE — TELEPHONE ENCOUNTER
Pt is calling to get  methacholine test results done on 3/3  Do you want to change her meds based on her results? Her tel 21   Thanks

## 2018-03-14 ENCOUNTER — OFFICE VISIT (OUTPATIENT)
Dept: OBGYN CLINIC | Facility: CLINIC | Age: 48
End: 2018-03-14
Payer: COMMERCIAL

## 2018-03-14 VITALS
WEIGHT: 170.6 LBS | DIASTOLIC BLOOD PRESSURE: 82 MMHG | SYSTOLIC BLOOD PRESSURE: 136 MMHG | BODY MASS INDEX: 30.23 KG/M2 | HEIGHT: 63 IN

## 2018-03-14 DIAGNOSIS — Z12.31 ENCOUNTER FOR SCREENING MAMMOGRAM FOR BREAST CANCER: ICD-10-CM

## 2018-03-14 DIAGNOSIS — Z01.419 ENCOUNTER FOR GYNECOLOGICAL EXAMINATION WITHOUT ABNORMAL FINDING: ICD-10-CM

## 2018-03-14 DIAGNOSIS — Z11.51 SCREENING FOR HPV (HUMAN PAPILLOMAVIRUS): ICD-10-CM

## 2018-03-14 DIAGNOSIS — Z12.4 CERVICAL CANCER SCREENING: Primary | ICD-10-CM

## 2018-03-14 DIAGNOSIS — N39.3 SUI (STRESS URINARY INCONTINENCE, FEMALE): ICD-10-CM

## 2018-03-14 PROCEDURE — 99396 PREV VISIT EST AGE 40-64: CPT | Performed by: OBSTETRICS & GYNECOLOGY
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
## 2018-03-16 LAB
CLINICAL INFO: NORMAL
CYTO CVX: NORMAL
CYTOLOGY CMNT CVX/VAG CYTO-IMP: NORMAL
DATE PREVIOUS BX: NORMAL
HPV E6+E7 MRNA CVX QL NAA+PROBE: NOT DETECTED
LMP START DATE: NORMAL
SL AMB PREV. PAP:: NORMAL
SPECIMEN SOURCE CVX/VAG CYTO: NORMAL

## 2018-04-16 ENCOUNTER — OFFICE VISIT (OUTPATIENT)
Dept: BEHAVIORAL/MENTAL HEALTH CLINIC | Facility: CLINIC | Age: 48
End: 2018-04-16

## 2018-04-16 DIAGNOSIS — F41.0 PANIC ATTACKS: ICD-10-CM

## 2018-04-16 DIAGNOSIS — F41.9 ANXIETY: ICD-10-CM

## 2018-04-16 DIAGNOSIS — F33.1 MDD (MAJOR DEPRESSIVE DISORDER), RECURRENT EPISODE, MODERATE (HCC): ICD-10-CM

## 2018-04-16 DIAGNOSIS — F43.10 PTSD (POST-TRAUMATIC STRESS DISORDER): Primary | ICD-10-CM

## 2018-04-16 PROCEDURE — 90834 PSYTX W PT 45 MINUTES: CPT | Performed by: SOCIAL WORKER
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
## 2018-04-24 ENCOUNTER — OFFICE VISIT (OUTPATIENT)
Dept: PULMONOLOGY | Facility: HOSPITAL | Age: 48
End: 2018-04-24

## 2018-04-24 VITALS
RESPIRATION RATE: 14 BRPM | SYSTOLIC BLOOD PRESSURE: 120 MMHG | TEMPERATURE: 98.2 F | DIASTOLIC BLOOD PRESSURE: 80 MMHG | BODY MASS INDEX: 31.01 KG/M2 | HEART RATE: 80 BPM | HEIGHT: 63 IN | WEIGHT: 175 LBS | OXYGEN SATURATION: 99 %

## 2018-04-24 DIAGNOSIS — Z72.0 TOBACCO ABUSE: ICD-10-CM

## 2018-04-24 DIAGNOSIS — J43.8 OTHER EMPHYSEMA (HCC): Primary | ICD-10-CM

## 2018-04-24 PROBLEM — J45.30 MILD PERSISTENT ASTHMA WITHOUT COMPLICATION: Status: RESOLVED | Noted: 2017-09-27 | Resolved: 2018-04-24

## 2018-04-24 PROCEDURE — 99214 OFFICE O/P EST MOD 30 MIN: CPT | Performed by: INTERNAL MEDICINE

## 2018-04-24 RX ORDER — FLUTICASONE PROPIONATE AND SALMETEROL 113; 14 UG/1; UG/1
1 POWDER, METERED RESPIRATORY (INHALATION) 2 TIMES DAILY
Qty: 3 EACH | Refills: 3 | Status: SHIPPED | OUTPATIENT
Start: 2018-04-24 | End: 2019-05-22 | Stop reason: SDUPTHER

## 2018-04-24 NOTE — PROGRESS NOTES
Assessment:    Emphysema ([facility] )  The patient does not have obstruction on PFTs and had a negative methacholine challenge  Despite only having emphysema, she seems to have a clinical improvement with using air duo twice daily  We will continue this medication  I asked her to use albuterol on an as-needed basis  I believe she would not need any medications if we could get her to stop smoking  She is up-to-date on her immunizations  She does not need lung cancer screening CT is she is not of the appropriate age and she had imaging 2 months ago  She will continue to work on increasing her exercise  Will hold off on pulmonary rehabilitation at this time as she is working  Tobacco abuse  The patient will continue to work on stopping smoking  I have asked her to decrease by 1 cigarette every 2 weeks  Plan:    Diagnoses and all orders for this visit:    Other emphysema ([facility] )  -     Fluticasone-Salmeterol (AIRDUO RESPICLICK 564/34) 861-94 MCG/ACT AEPB; Inhale 1 puff 2 (two) times a day    Tobacco abuse        Follow-up:  3-4 months      HPI:    Since our last visit, she will get some coughing if taking a deep breath  She also cough in the am where she is "hacking"  She gets short of breath with carrying things or exercising  All her symptoms improve with the AirDuo that she is using twice daily- she actually does more when she takes it because even her energy level improves  She is still smoking - she is down to 0 5 ppd      Review of Systems   Constitutional: Negative for activity change and unexpected weight change  HENT: Negative for congestion  Respiratory: Positive for cough and shortness of breath  Cardiovascular: Negative for chest pain and leg swelling         The following portions of the patient's history were reviewed and updated as appropriate: allergies, current medications, past family history, past medical history, past social history, past surgical history and problem list     VITALS:  Vitals:    04/24/18 0936   BP: 120/80   Pulse: 80   Resp: 14   Temp: 98 2 °F (36 8 °C)   SpO2: 99%   Weight: 79 4 kg (175 lb)   Height: 5' 3" (1 6 m)       Physical Exam  General:  Patient is awake, alert, non-toxic and in no acute respiratory distress  Neck: No JVD  CV:  Regular, +S1 and S2, No murmurs, gallops or rubs appreciated  Lungs:   Clear to auscultation bilateral without wheeze, rales or rhonchi  Abdomen: Soft, +BS, Non-tender, non-distended  Extremities: No clubbing, cyanosis or edema  Neuro: No focal deficits        Diagnostic Testing:    PFTs:  3/3/18:  Normal Spirometry, mild air trapping, mild diffusion impairment, Negative methacholine challenge  Spirometry:  FEV1/FVC Ratio is 77%  FEV1 is 2 74 L which is 99% predicted  FVC is 3 54 L which is 102% predicted  Lung volumes: Total lung capacity 129% predicted  Thoracic gas volume 132% predicted  There is elevation of the residual volume and RV/TLC ratio  Diffusing capacity:  DLCO 75% predicted     Methacholine Challenge:  Patient received serial doses of methacholine up to 16 milligrams/milliliter  At peak dose of methacholine, there was no significant change in the FEV1 or the specific airway conductance    Patient felt symptomatic improvement after albuterol nebulization       CBC:  Lab Results   Component Value Date    WBC 10 1 01/24/2017    HGB 14 4 01/24/2017    HCT 44 2 01/24/2017    MCV 94 5 01/24/2017     01/24/2017         BMP:   Lab Results   Component Value Date     01/24/2017    K 4 2 01/24/2017     01/24/2017    CO2 26 01/24/2017    BUN 10 01/24/2017    CREATININE 0 87 01/24/2017    CALCIUM 9 5 01/24/2017    AST 16 01/24/2017    ALT 15 01/24/2017    ALKPHOS 92 01/24/2017    PROT 6 6 01/24/2017    BILITOT 0 3 01/24/2017         Esvin Madden DO

## 2018-04-25 NOTE — ASSESSMENT & PLAN NOTE
The patient does not have obstruction on PFTs and had a negative methacholine challenge  Despite only having emphysema, she seems to have a clinical improvement with using air duo twice daily  We will continue this medication  I asked her to use albuterol on an as-needed basis  I believe she would not need any medications if we could get her to stop smoking  She is up-to-date on her immunizations  She does not need lung cancer screening CT is she is not of the appropriate age and she had imaging 2 months ago  She will continue to work on increasing her exercise  Will hold off on pulmonary rehabilitation at this time as she is working

## 2018-04-25 NOTE — ASSESSMENT & PLAN NOTE
The patient will continue to work on stopping smoking  I have asked her to decrease by 1 cigarette every 2 weeks

## 2018-05-01 ENCOUNTER — OFFICE VISIT (OUTPATIENT)
Dept: BEHAVIORAL/MENTAL HEALTH CLINIC | Facility: CLINIC | Age: 48
End: 2018-05-01
Payer: COMMERCIAL

## 2018-05-01 DIAGNOSIS — F41.9 ANXIETY: Primary | ICD-10-CM

## 2018-05-01 DIAGNOSIS — F33.1 MDD (MAJOR DEPRESSIVE DISORDER), RECURRENT EPISODE, MODERATE (HCC): ICD-10-CM

## 2018-05-01 DIAGNOSIS — F43.10 PTSD (POST-TRAUMATIC STRESS DISORDER): ICD-10-CM

## 2018-05-01 PROCEDURE — 90834 PSYTX W PT 45 MINUTES: CPT | Performed by: SOCIAL WORKER

## 2018-05-01 NOTE — PSYCH
Psychotherapy Provided: Individual Psychotherapy 45 minutes     Length of time in session: 45 minutes, follow up in 2 week    Goals addressed in session: Goal 2     Pain:      none    0    Current suicide risk : Low     D- Edelmira Heaton stated that she has been struggling with her mother's loss due to it being the third year anniversary of her death  She stated that she feels guilty because she felt her mother was being dramatic prior to her surgery  Processing her emotions and discussing the difficulty of her relationship with her mother  Discussing the positive times that she experienced and ways to continue to process her grief  Giving supportive therapy  A- Progress - Continuing to process her emotions  P-Continue treatment    [address]: Diagnosis and Treatment Plan explained to Chantel Sam relates understanding diagnosis and is agreeable to Treatment Plan   Yes

## 2018-05-15 ENCOUNTER — OFFICE VISIT (OUTPATIENT)
Dept: PSYCHIATRY | Facility: CLINIC | Age: 48
End: 2018-05-15
Payer: COMMERCIAL

## 2018-05-15 DIAGNOSIS — F41.0 PANIC ATTACKS: ICD-10-CM

## 2018-05-15 DIAGNOSIS — F41.9 ANXIETY: ICD-10-CM

## 2018-05-15 DIAGNOSIS — F33.1 MDD (MAJOR DEPRESSIVE DISORDER), RECURRENT EPISODE, MODERATE (HCC): Primary | ICD-10-CM

## 2018-05-15 DIAGNOSIS — F43.10 PTSD (POST-TRAUMATIC STRESS DISORDER): ICD-10-CM

## 2018-05-15 PROCEDURE — 90833 PSYTX W PT W E/M 30 MIN: CPT | Performed by: PSYCHIATRY & NEUROLOGY

## 2018-05-15 PROCEDURE — 99214 OFFICE O/P EST MOD 30 MIN: CPT | Performed by: PSYCHIATRY & NEUROLOGY

## 2018-05-15 RX ORDER — SERTRALINE HYDROCHLORIDE 100 MG/1
150 TABLET, FILM COATED ORAL DAILY
Qty: 135 TABLET | Refills: 3 | Status: SHIPPED | OUTPATIENT
Start: 2018-05-15 | End: 2019-06-26 | Stop reason: SDUPTHER
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
## 2018-05-22 ENCOUNTER — TELEPHONE (OUTPATIENT)
Dept: PULMONOLOGY | Facility: CLINIC | Age: 48
End: 2018-05-22

## 2018-08-30 ENCOUNTER — DOCUMENTATION (OUTPATIENT)
Dept: PSYCHIATRY | Facility: CLINIC | Age: 48
End: 2018-08-30

## 2018-08-30 NOTE — PROGRESS NOTES
Treatment Plan not completed within required time limits due to: cancelled appointment  on 8/16/2018

## 2018-11-17 ENCOUNTER — OFFICE VISIT (OUTPATIENT)
Dept: URGENT CARE | Facility: CLINIC | Age: 48
End: 2018-11-17
Payer: COMMERCIAL

## 2018-11-17 ENCOUNTER — TELEPHONE (OUTPATIENT)
Dept: OTHER | Facility: OTHER | Age: 48
End: 2018-11-17

## 2018-11-17 VITALS
HEIGHT: 63 IN | SYSTOLIC BLOOD PRESSURE: 125 MMHG | BODY MASS INDEX: 29.59 KG/M2 | TEMPERATURE: 98.2 F | DIASTOLIC BLOOD PRESSURE: 70 MMHG | WEIGHT: 167 LBS | HEART RATE: 90 BPM

## 2018-11-17 DIAGNOSIS — Z23 ENCOUNTER FOR IMMUNIZATION: ICD-10-CM

## 2018-11-17 DIAGNOSIS — H00.012 HORDEOLUM EXTERNUM OF RIGHT LOWER EYELID: Primary | ICD-10-CM

## 2018-11-17 DIAGNOSIS — J01.00 ACUTE NON-RECURRENT MAXILLARY SINUSITIS: ICD-10-CM

## 2018-11-17 PROCEDURE — G0382 LEV 3 HOSP TYPE B ED VISIT: HCPCS

## 2018-11-17 PROCEDURE — 90686 IIV4 VACC NO PRSV 0.5 ML IM: CPT

## 2018-11-17 RX ORDER — DOXYCYCLINE 100 MG/1
100 TABLET ORAL 2 TIMES DAILY
Qty: 20 TABLET | Refills: 0 | Status: SHIPPED | OUTPATIENT
Start: 2018-11-17 | End: 2018-11-27

## 2018-11-17 RX ORDER — TOBRAMYCIN 3 MG/ML
1 SOLUTION/ DROPS OPHTHALMIC
Qty: 5 ML | Refills: 0 | Status: SHIPPED | OUTPATIENT
Start: 2018-11-17 | End: 2020-01-23 | Stop reason: ALTCHOICE

## 2018-11-17 NOTE — PATIENT INSTRUCTIONS
Sinusitis   WHAT YOU NEED TO KNOW:   Sinusitis is inflammation or infection of your sinuses  It is most often caused by a virus  Acute sinusitis may last up to 12 weeks  Chronic sinusitis lasts longer than 12 weeks  Recurrent sinusitis means you have 4 or more times in 1 year  DISCHARGE INSTRUCTIONS:   Return to the emergency department if:   · Your eye and eyelid are red, swollen, and painful  · You cannot open your eye  · You have vision changes, such as double vision  · Your eyeball bulges out or you cannot move your eye  · You are more sleepy than normal, or you notice changes in your ability to think, move, or talk  · You have a stiff neck, a fever, or a bad headache  · You have swelling of your forehead or scalp  Contact your healthcare provider if:   · Your symptoms do not improve after 3 days  · Your symptoms do not go away after 10 days  · You have nausea and are vomiting  · Your nose is bleeding  · You have questions or concerns about your condition or care  Medicines: Your symptoms may go away on their own  Your healthcare provider may recommend watchful waiting for up to 10 days before starting antibiotics  You may  need any of the following:  · Acetaminophen  decreases pain and fever  It is available without a doctor's order  Ask how much to take and how often to take it  Follow directions  Read the labels of all other medicines you are using to see if they also contain acetaminophen, or ask your doctor or pharmacist  Acetaminophen can cause liver damage if not taken correctly  Do not use more than 4 grams (4,000 milligrams) total of acetaminophen in one day  · NSAIDs , such as ibuprofen, help decrease swelling, pain, and fever  This medicine is available with or without a doctor's order  NSAIDs can cause stomach bleeding or kidney problems in certain people  If you take blood thinner medicine, always ask your healthcare provider if NSAIDs are safe for you  Always read the medicine label and follow directions  · Nasal steroid sprays  may help decrease inflammation in your nose and sinuses  · Decongestants  help reduce swelling and drain mucus in the nose and sinuses  They may help you breathe easier  · Antihistamines  help dry mucus in the nose and relieve sneezing  · Antibiotics  help treat or prevent a bacterial infection  · Take your medicine as directed  Contact your healthcare provider if you think your medicine is not helping or if you have side effects  Tell him or her if you are allergic to any medicine  Keep a list of the medicines, vitamins, and herbs you take  Include the amounts, and when and why you take them  Bring the list or the pill bottles to follow-up visits  Carry your medicine list with you in case of an emergency  Self-care:   · Rinse your sinuses  Use a sinus rinse device to rinse your nasal passages with a saline (salt water) solution or distilled water  Do not use tap water  This will help thin the mucus in your nose and rinse away pollen and dirt  It will also help reduce swelling so you can breathe normally  Ask your healthcare provider how often to do this  · Breathe in steam   Heat a bowl of water until you see steam  Lean over the bowl and make a tent over your head with a large towel  Breathe deeply for about 20 minutes  Be careful not to get too close to the steam or burn yourself  Do this 3 times a day  You can also breathe deeply when you take a hot shower  · Sleep with your head elevated  Place an extra pillow under your head before you go to sleep to help your sinuses drain  · Drink liquids as directed  Ask your healthcare provider how much liquid to drink each day and which liquids are best for you  Liquids will thin the mucus in your nose and help it drain  Avoid drinks that contain alcohol or caffeine  · Do not smoke, and avoid secondhand smoke    Nicotine and other chemicals in cigarettes and cigars can make your symptoms worse  Ask your healthcare provider for information if you currently smoke and need help to quit  E-cigarettes or smokeless tobacco still contain nicotine  Talk to your healthcare provider before you use these products  Prevent the spread of germs that cause sinusitis:  Wash your hands often with soap and water  Wash your hands after you use the bathroom, change a child's diaper, or sneeze  Wash your hands before you prepare or eat food  Follow up with your healthcare provider as directed: You may be referred to an ear, nose, and throat specialist  Write down your questions so you remember to ask them during your visits  © 2017 [address] Information is for End User's use only and may not be sold, redistributed or otherwise used for commercial purposes  All illustrations and images included in CareNotes® are the copyrighted property of A D A M , Inc  or Abdifatah Kenney  The above information is an  only  It is not intended as medical advice for individual conditions or treatments  Talk to your doctor, nurse or pharmacist before following any medical regimen to see if it is safe and effective for you  Stye   WHAT YOU NEED TO KNOW:   A stye is a lump on the edge or inside of your eyelid caused by an infection  A stye can form on your upper or lower eyelid  It usually goes away in 2 to 4 days  DISCHARGE INSTRUCTIONS:   Medicines:   · Antibiotic medicine: This is given as an ointment to put into your eye  It is used to fight an infection caused by bacteria  Use as directed  · Take your medicine as directed  Contact your healthcare provider if you think your medicine is not helping or if you have side effects  Tell him of her if you are allergic to any medicine  Keep a list of the medicines, vitamins, and herbs you take  Include the amounts, and when and why you take them  Bring the list or the pill bottles to follow-up visits  Carry your medicine list with you in case of an emergency  Follow up with your healthcare provider as directed:  Write down your questions so you remember to ask them during your visits  Self-care:   · Use warm compresses: This will help decrease swelling and pain  Wet a clean washcloth with warm water and place it on your eye for 10 to 15 minutes, 3 to 4 times each day or as directed  · Keep your hands away from your eye: This helps to prevent the spread of the infection to other parts of the eye  Wash your hands often with soap and dry with a clean towel  Do not squeeze the stye  · Do not use eye makeup:  Do not wear eye makeup while you have a stye  Eye makeup may carry bacteria and cause another stye  Throw away eye makeup and brushes used to apply the makeup  Use new eye makeup after the stye has gone away  Do not share eye makeup with others  · Prevent another stye:  Wash your face and clean your eyelashes every day  Remove eye makeup with makeup remover  This helps to completely remove eye makeup without heavy rubbing  Contact your healthcare provider if:   · You have redness and discharge around your eye, and your eye pain is getting worse  · Your vision changes  · The stye has not gone away within 7 days  · The stye comes back within a short period of time after treatment  · You have questions or concerns about your condition or care  © 2017 [address] Information is for End User's use only and may not be sold, redistributed or otherwise used for commercial purposes  All illustrations and images included in CareNotes® are the copyrighted property of A D A M , Inc  or Abdifatah Kenney  The above information is an  only  It is not intended as medical advice for individual conditions or treatments  Talk to your doctor, nurse or pharmacist before following any medical regimen to see if it is safe and effective for you

## 2018-11-17 NOTE — TELEPHONE ENCOUNTER
Georgi March 1970  CONFIDENTIALTY NOTICE: This fax transmission is intended only for the addressee  It contains information that is legally privileged,  confidential or otherwise protected from use or disclosure  If you are not the intended recipient, you are strictly prohibited from reviewing,  disclosing, copying using or disseminating any of this information or taking any action in reliance on or regarding this information  If you have  received this fax in error, please notify us immediately by telephone so that we can arrange for its return to us  Page:   Call Id: [id number]  Health Call  Standard Call Report  Health Call  Patient Name: Georgi March  Gender: Female  : 1970  Age: 51 Y 10 M 6 D  Return Phone  Number: [phone])  Address: [place] [address]  City/State/Zip: [facility] [address]  Practice Name: [facility] INTERNAL  MEDICINE [facility]  Practice Charged:  Physician:  [address] [place] [address] Name:  Relationship To  Patient: Self  Return Phone Number: [phone] (Cell)  Presenting Problem: "I have something in my eye  It's a big  white bulb on the right lower lid "  Service Type: Triage  Charged Service 1: Lauren Weiner U  38  Name and  Number:  Nurse Assessment  Nurse: Gregoria Gaytan Date/Time: 2018 9:28:19 AM  Type of assessment required:  ---General (Adult or Child)  Duration of Current S/S  ---Started 4 - 5 days ago  Location/Radiation  ---Right eye, lower lid  Temperature (F) and route:  ---Denies fever  Symptom Specific Meds (Dose/Time):  ---None  Other S/S  ---Patient has a small, white lump (looks like pus), inside her lower right eyelid  Causing moderate to severe swelling, redness and very tender to touch  Cheek is  starting to become red and tender as well    Pain Scale on scale of 1-10, 10 being the worst:  ---5 out of 10  Symptom progression:  ---worse  Intake and Output  Georgi March 1970  CONFIDENTIALTY NOTICE: This fax transmission is intended only for the addressee  It contains information that is legally privileged,  confidential or otherwise protected from use or disclosure  If you are not the intended recipient, you are strictly prohibited from reviewing,  disclosing, copying using or disseminating any of this information or taking any action in reliance on or regarding this information  If you have  received this fax in error, please notify us immediately by telephone so that we can arrange for its return to us  Page: 2 of 2  Call Id: [id number]  Nurse Assessment  ---WNL  LMP/ Pregnancy:  ---4 - 5 years ago / Menopause  Breastfeeding  ---No  Last Exam/Treatment:  ---"It's been a while "  Protocols  Protocol Title Nurse Date/Time  Eye - Swelling Carlito Herrera RN, Ry Dodson 11/17/2018 9:31:57 AM  Question Caller Affirmed  Disp  Time Disposition Final User  11/17/2018 9:36:36 AM See Physician within 4 Hours (or PCP  triage)  Carlito Herrera RN, Andrew Hanks  11/17/2018 9:36:44 AM RN Triaged Yes Carlito Herrera RN, [facility] Advice Given Per Protocol  SEE PHYSICIAN WITHIN 4 HOURS (or PCP triage): * IF OFFICE WILL BE CLOSED AND NO PCP TRIAGE: You need to be  seen within the next 3 or 4 hours  A nearby Urgent Care Center is often a good source of care  Another choice is to go to the ER  Go  sooner if you become worse  PAIN MEDICINES: * For pain relief, take acetaminophen, ibuprofen, or naproxen  * Use the lowest amount  that makes your pain feel better  IBUPROFEN (E G , MOTRIN, ADVIL): * Take 400 mg (two 200 mg pills) by mouth every 6 hours  as needed  * Another choice is to take 600 mg (three 200 mg pills) by mouth every 8 hours as needed  * The most you should take each  day is 1,200 mg (six 200 mg pills a day), unless your doctor has told you to take more  CAUTION - NSAIDS (E G , IBUPROFEN,  NAPROXEN): * Do not take nonsteroidal anti-inflammatory drugs (NSAIDs) if you have stomach problems, kidney disease, heart  failure, or other contraindications to using this type of medication  * Do not take NSAID medications for over 7 days without consulting  your PCP  * Do not take NSAID medications if you are pregnant  * You may take this medicine with or without food  Taking it with food  or milk may lessen the chance the drug will upset your stomach  * GASTROINTESTINAL RISK: There is an increased risk of stomach  ulcers, GI bleeding, perforation  * CARDIOVASCULAR RISK: There may be an increased risk of heart attack and stroke  CALL BACK  IF: * You become worse  CARE ADVICE given per Eye - Swelling (Adult) guideline    Caller Understands: Yes  Caller Disagree/Comply: Comply  PreDisposition: Unsure

## 2018-11-17 NOTE — PROGRESS NOTES
[facility]        NAME: Alaina Dupree is a 51 y o  female  : 1970    MRN: [id number]      Assessment and Plan   Hordeolum externum of right lower eyelid [H00 012]  1  Hordeolum externum of right lower eyelid  tobramycin (TOBREX) 0 3 % SOLN   2  Acute non-recurrent maxillary sinusitis  doxycycline (ADOXA) 100 MG tablet         Patient Instructions     Patient Instructions     Sinusitis   WHAT YOU NEED TO KNOW:   Sinusitis is inflammation or infection of your sinuses  It is most often caused by a virus  Acute sinusitis may last up to 12 weeks  Chronic sinusitis lasts longer than 12 weeks  Recurrent sinusitis means you have 4 or more times in 1 year  DISCHARGE INSTRUCTIONS:   Return to the emergency department if:   · Your eye and eyelid are red, swollen, and painful  · You cannot open your eye  · You have vision changes, such as double vision  · Your eyeball bulges out or you cannot move your eye  · You are more sleepy than normal, or you notice changes in your ability to think, move, or talk  · You have a stiff neck, a fever, or a bad headache  · You have swelling of your forehead or scalp  Contact your healthcare provider if:   · Your symptoms do not improve after 3 days  · Your symptoms do not go away after 10 days  · You have nausea and are vomiting  · Your nose is bleeding  · You have questions or concerns about your condition or care  Medicines: Your symptoms may go away on their own  Your healthcare provider may recommend watchful waiting for up to 10 days before starting antibiotics  You may  need any of the following:  · Acetaminophen  decreases pain and fever  It is available without a doctor's order  Ask how much to take and how often to take it  Follow directions   Read the labels of all other medicines you are using to see if they also contain acetaminophen, or ask your doctor or pharmacist  Acetaminophen can cause liver damage if not taken correctly  Do not use more than 4 grams (4,000 milligrams) total of acetaminophen in one day  · NSAIDs , such as ibuprofen, help decrease swelling, pain, and fever  This medicine is available with or without a doctor's order  NSAIDs can cause stomach bleeding or kidney problems in certain people  If you take blood thinner medicine, always ask your healthcare provider if NSAIDs are safe for you  Always read the medicine label and follow directions  · Nasal steroid sprays  may help decrease inflammation in your nose and sinuses  · Decongestants  help reduce swelling and drain mucus in the nose and sinuses  They may help you breathe easier  · Antihistamines  help dry mucus in the nose and relieve sneezing  · Antibiotics  help treat or prevent a bacterial infection  · Take your medicine as directed  Contact your healthcare provider if you think your medicine is not helping or if you have side effects  Tell him or her if you are allergic to any medicine  Keep a list of the medicines, vitamins, and herbs you take  Include the amounts, and when and why you take them  Bring the list or the pill bottles to follow-up visits  Carry your medicine list with you in case of an emergency  Self-care:   · Rinse your sinuses  Use a sinus rinse device to rinse your nasal passages with a saline (salt water) solution or distilled water  Do not use tap water  This will help thin the mucus in your nose and rinse away pollen and dirt  It will also help reduce swelling so you can breathe normally  Ask your healthcare provider how often to do this  · Breathe in steam   Heat a bowl of water until you see steam  Lean over the bowl and make a tent over your head with a large towel  Breathe deeply for about 20 minutes  Be careful not to get too close to the steam or burn yourself  Do this 3 times a day  You can also breathe deeply when you take a hot shower  · Sleep with your head elevated    Place an extra pillow under your head before you go to sleep to help your sinuses drain  · Drink liquids as directed  Ask your healthcare provider how much liquid to drink each day and which liquids are best for you  Liquids will thin the mucus in your nose and help it drain  Avoid drinks that contain alcohol or caffeine  · Do not smoke, and avoid secondhand smoke  Nicotine and other chemicals in cigarettes and cigars can make your symptoms worse  Ask your healthcare provider for information if you currently smoke and need help to quit  E-cigarettes or smokeless tobacco still contain nicotine  Talk to your healthcare provider before you use these products  Prevent the spread of germs that cause sinusitis:  Wash your hands often with soap and water  Wash your hands after you use the bathroom, change a child's diaper, or sneeze  Wash your hands before you prepare or eat food  Follow up with your healthcare provider as directed: You may be referred to an ear, nose, and throat specialist  Write down your questions so you remember to ask them during your visits  © 2017 [employer] Information is for End User's use only and may not be sold, redistributed or otherwise used for commercial purposes  All illustrations and images included in CareNotes® are the copyrighted property of [employer] M [employer] Inc  or [employer]  The above information is an  only  It is not intended as medical advice for individual conditions or treatments  Talk to your doctor, nurse or pharmacist before following any medical regimen to see if it is safe and effective for you  Stye   WHAT YOU NEED TO KNOW:   A stye is a lump on the edge or inside of your eyelid caused by an infection  A stye can form on your upper or lower eyelid  It usually goes away in 2 to 4 days  DISCHARGE INSTRUCTIONS:   Medicines:   · Antibiotic medicine: This is given as an ointment to put into your eye   It is used to fight an infection caused by bacteria  Use as directed  · Take your medicine as directed  Contact your healthcare provider if you think your medicine is not helping or if you have side effects  Tell him of her if you are allergic to any medicine  Keep a list of the medicines, vitamins, and herbs you take  Include the amounts, and when and why you take them  Bring the list or the pill bottles to follow-up visits  Carry your medicine list with you in case of an emergency  Follow up with your healthcare provider as directed:  Write down your questions so you remember to ask them during your visits  Self-care:   · Use warm compresses: This will help decrease swelling and pain  Wet a clean washcloth with warm water and place it on your eye for 10 to 15 minutes, 3 to 4 times each day or as directed  · Keep your hands away from your eye: This helps to prevent the spread of the infection to other parts of the eye  Wash your hands often with soap and dry with a clean towel  Do not squeeze the stye  · Do not use eye makeup:  Do not wear eye makeup while you have a stye  Eye makeup may carry bacteria and cause another stye  Throw away eye makeup and brushes used to apply the makeup  Use new eye makeup after the stye has gone away  Do not share eye makeup with others  · Prevent another stye:  Wash your face and clean your eyelashes every day  Remove eye makeup with makeup remover  This helps to completely remove eye makeup without heavy rubbing  Contact your healthcare provider if:   · You have redness and discharge around your eye, and your eye pain is getting worse  · Your vision changes  · The stye has not gone away within 7 days  · The stye comes back within a short period of time after treatment  · You have questions or concerns about your condition or care  © 2017 [employer] Information is for End User's use only and may not be sold, redistributed or otherwise used for commercial purposes  All illustrations and images included in CareNotes® are the copyrighted property of [employer]  or [employer]  The above information is an  only  It is not intended as medical advice for individual conditions or treatments  Talk to your doctor, nurse or pharmacist before following any medical regimen to see if it is safe and effective for you  Follow up with PCP in 3-5 days  Proceed to  ER if symptoms worsen  Chief Complaint     Chief Complaint   Patient presents with    Sinusitis     cough and sinus infection and right eye bubble  History of Present Illness       Eye Pain    The right eye is affected  This is a new problem  The current episode started yesterday  The problem occurs constantly  The problem has been unchanged  There was no injury mechanism  The pain is at a severity of 5/10  The pain is moderate  There is no known exposure to pink eye  She does not wear contacts  Associated symptoms include an eye discharge, photophobia and a recent URI  Pertinent negatives include no blurred vision, double vision, eye redness, fever, foreign body sensation, itching, nausea or vomiting  Sinusitis   This is a new problem  The current episode started in the past 7 days  The problem is unchanged  There has been no fever  The pain is moderate  Associated symptoms include congestion, coughing, headaches, sinus pressure, sneezing and a sore throat  Pertinent negatives include no chills, diaphoresis, ear pain, hoarse voice, neck pain, shortness of breath or swollen glands  (Pt has 30 yr pack hx    ) Past treatments include nothing  The treatment provided mild relief  Review of Systems   Review of Systems   Constitutional: Negative for chills, diaphoresis, fatigue and fever  HENT: Positive for congestion, sinus pressure, sneezing and sore throat  Negative for ear pain, hearing loss, hoarse voice, postnasal drip and sinus pain      Eyes: Positive for photophobia, pain and discharge  Negative for blurred vision, double vision, redness and itching  Respiratory: Positive for cough  Negative for chest tightness and shortness of breath  Cardiovascular: Negative for chest pain  Gastrointestinal: Negative for abdominal pain, constipation, nausea and vomiting  Genitourinary: Negative for difficulty urinating  Musculoskeletal: Negative for arthralgias, myalgias and neck pain  Skin: Negative for rash  Neurological: Positive for headaches  Negative for dizziness  Psychiatric/Behavioral: Negative for behavioral problems           Current Medications       Current Outpatient Prescriptions:     albuterol (PROAIR HFA) 90 mcg/act inhaler, Inhale 2 puffs every 6 (six) hours as needed for wheezing or shortness of breath, Disp: 1 Inhaler, Rfl: 6    B Complex-Folic Acid (SUPER B COMPLEX MAXI) TABS, Take by mouth, Disp: , Rfl:     Cholecalciferol (VITAMIN D3) 1000 units CAPS, Take 2 capsules by mouth daily, Disp: , Rfl:     Fluticasone-Salmeterol (AIRDUO RESPICLICK 820/70) 912-08 MCG/ACT AEPB, Inhale 1 puff 2 (two) times a day, Disp: 3 each, Rfl: 3    Multiple Vitamin (MULTI-VITAMIN DAILY PO), Take 1 tablet by mouth daily, Disp: , Rfl:     sertraline (ZOLOFT) 100 mg tablet, Take 1 5 tablets (150 mg total) by mouth daily, Disp: 135 tablet, Rfl: 3    doxycycline (ADOXA) 100 MG tablet, Take 1 tablet (100 mg total) by mouth 2 (two) times a day for 10 days, Disp: 20 tablet, Rfl: 0    tobramycin (TOBREX) 0 3 % SOLN, Administer 1 drop to both eyes every 4 (four) hours while awake, Disp: 5 mL, Rfl: 0    Current Allergies     Allergies as of 11/17/2018 - Reviewed 11/17/2018   Allergen Reaction Noted    Levaquin [levofloxacin] Throat Swelling 02/23/2018    Penicillins Throat Swelling 02/23/2018            The following portions of the patient's history were reviewed and updated as appropriate: allergies, current medications, past family history, past medical history, past social history, past surgical history and problem list      Past Medical History:   Diagnosis Date    Asthma     Depression, major, recurrent ([facility] ) 2/14/2018    Dysmenorrhea     Emphysema of lung ([facility] )     Hypertension     IBS (irritable bowel syndrome)     Liver lesion 1/9/2018    Menopausal symptoms     Other emphysema ([facility] ) 2/23/2018    PTSD (post-traumatic stress disorder) 2/14/2018    Skin disorder     Tick bite        Past Surgical History:   Procedure Laterality Date    CHOLECYSTECTOMY      KNEE SURGERY      MICRODISCECTOMY LUMBAR  2003    TONSILLECTOMY  1986       Family History   Problem Relation Age of Onset    Bipolar disorder Mother     Diverticulitis Mother     Clotting disorder Mother         coagulation disorder; after hernia surg and had multiorgan failure- [facility] age 71    Mental illness Mother         suicidal ideation   [facility] OCD Sister     Bipolar disorder Brother         affective    Substance Abuse Brother     Suicide Attempts Brother     Aortic aneurysm Other     Aortic aneurysm Family         abdominal    Diabetes Family     COPD Maternal Grandfather          Medications have been verified  Objective   /70   Pulse 90   Temp 98 2 °F (36 8 °C)   Ht 5' 3" (1 6 m)   Wt 75 8 kg (167 lb)   BMI 29 58 kg/m²        Physical Exam     Physical Exam   Constitutional: She is oriented to person, place, and time  She appears well-developed and well-nourished  HENT:   Right Ear: Tympanic membrane and external ear normal    Left Ear: Tympanic membrane and external ear normal    Nose: Mucosal edema and rhinorrhea present  Right sinus exhibits maxillary sinus tenderness  Left sinus exhibits maxillary sinus tenderness and frontal sinus tenderness  Mouth/Throat: Uvula is midline and mucous membranes are normal  Oropharyngeal exudate, posterior oropharyngeal edema and posterior oropharyngeal erythema present     Eyes: Pupils are equal, round, and reactive to light  EOM and lids are normal  Right eye exhibits chemosis and hordeolum  Right conjunctiva is injected  Left conjunctiva is not injected  Left conjunctiva has no hemorrhage  Right eye exhibits normal extraocular motion and no nystagmus  Left eye exhibits normal extraocular motion and no nystagmus  Right pupil is round and reactive  Left pupil is round and reactive  Neck: Normal range of motion  No edema present  Cardiovascular: Normal rate, regular rhythm, S1 normal, S2 normal and normal heart sounds  No murmur heard  Pulmonary/Chest: Effort normal and breath sounds normal  No respiratory distress  She has no decreased breath sounds  She has no wheezes  She has no rhonchi  She has no rales  She exhibits no tenderness  Lymphadenopathy:     She has no cervical adenopathy  Neurological: She is alert and oriented to person, place, and time  Skin: Skin is warm, dry and intact  No rash noted  Psychiatric: She has a normal mood and affect  Her speech is normal and behavior is normal    Nursing note and vitals reviewed

## 2018-11-19 NOTE — TELEPHONE ENCOUNTER
Set sounds like she may have had a stye or some type of inflammatory reaction-check with if the patient was seen by urgent care or ER- was she put in to be seen today by our office?

## 2018-11-19 NOTE — TELEPHONE ENCOUNTER
Looks like this was handled by the RN team who takes messages for us on off hours  You have to read between the lines to see the problem and the what the pt was told to do by the RN Team       In other S/S is the problem and the answer by the RN team is below it

## 2018-11-20 NOTE — TELEPHONE ENCOUNTER
I called the pt  She ended up going to  urg care and got Tobramycin eye drops and Docycline abx  She is feeling better    dk

## 2019-02-14 ENCOUNTER — TELEPHONE (OUTPATIENT)
Dept: OBGYN CLINIC | Facility: CLINIC | Age: 49
End: 2019-02-14

## 2019-03-01 ENCOUNTER — AMB VIDEO VISIT (OUTPATIENT)
Dept: URGENT CARE | Age: 49
End: 2019-03-01

## 2019-03-01 DIAGNOSIS — J11.1 INFLUENZA: Primary | ICD-10-CM

## 2019-03-01 PROCEDURE — EVISIT: Performed by: PHYSICIAN ASSISTANT

## 2019-03-01 NOTE — PROGRESS NOTES
[address] [facility]        NAME: Dinorah Chand is a 51 y o  female  : 1970    MRN: [id number]  DATE: 2019  TIME: 10:14 AM    Assessment and Plan   Influenza [J11 1]  1  Influenza         Offered to start pt on tamiflu  Patient declines  Pt educated on fever control, oral rehydration, symptom management    Video Visit - Dinorah Chand 51 y o  female MRN: [id number]    REQUIRED DOCUMENTATION:       There were no vitals filed for this visit  1  This service was provided via [facility]  2  Provider located at [address] [place] [address]  [phone]   3  [facility] provider: Carlyle Long PA-C   4  Identify all parties in room with patient during [facility] visit:  Self  5  After connecting through [employer], patient was identified by name and date of birth  Patient was then informed that this was a Telemedicine visit and that the exam was being conducted confidentially over secure lines  My office door was closed  No one else was in the room  Patient acknowledged consent and understanding of privacy and security of the Telemedicine visit  I informed the patient that I have reviewed their record in Epic and presented the opportunity for them to ask any questions regarding the visit today  The patient agreed to participate  Patient Instructions       Follow up with PCP in 3-5 days  Proceed to  ER if symptoms worsen  Chief Complaint   No chief complaint on file  History of Present Illness       Fever   This is a new problem  The current episode started yesterday  The problem occurs constantly  The problem has been unchanged  Associated symptoms include chills, congestion, coughing, fatigue, a fever (TMax 101 5) and a sore throat  Pertinent negatives include no abdominal pain, arthralgias, chest pain, diaphoresis, headaches, myalgias, nausea, neck pain, rash, swollen glands, urinary symptoms, vomiting or weakness  Nothing aggravates the symptoms  Treatments tried: Aleve once yesterday  Flonase  The treatment provided mild relief  Review of Systems   Review of Systems   Constitutional: Positive for chills, fatigue and fever (TMax 101 5)  Negative for diaphoresis  HENT: Positive for congestion, postnasal drip, sinus pressure, sinus pain, sneezing and sore throat  Negative for ear pain, rhinorrhea and voice change  Eyes: Negative  Respiratory: Positive for cough  Negative for chest tightness, shortness of breath and wheezing  Cardiovascular: Negative for chest pain and palpitations  Gastrointestinal: Negative for abdominal pain, constipation, diarrhea, nausea and vomiting  Endocrine: Negative  Genitourinary: Negative for dysuria  Musculoskeletal: Negative for arthralgias, back pain, myalgias and neck pain  Skin: Negative for pallor and rash  Allergic/Immunologic: Negative  Neurological: Negative for dizziness, syncope, weakness and headaches  Hematological: Negative  Psychiatric/Behavioral: Negative            Current Medications       Current Outpatient Medications:     albuterol (PROAIR HFA) 90 mcg/act inhaler, Inhale 2 puffs every 6 (six) hours as needed for wheezing or shortness of breath, Disp: 1 Inhaler, Rfl: 6    B Complex-Folic Acid (SUPER B COMPLEX MAXI) TABS, Take by mouth, Disp: , Rfl:     Cholecalciferol (VITAMIN D3) 1000 units CAPS, Take 2 capsules by mouth daily, Disp: , Rfl:     Fluticasone-Salmeterol (AIRDUO RESPICLICK 665/63) 007-52 MCG/ACT AEPB, Inhale 1 puff 2 (two) times a day, Disp: 3 each, Rfl: 3    Multiple Vitamin (MULTI-VITAMIN DAILY PO), Take 1 tablet by mouth daily, Disp: , Rfl:     sertraline (ZOLOFT) 100 mg tablet, Take 1 5 tablets (150 mg total) by mouth daily, Disp: 135 tablet, Rfl: 3    tobramycin (TOBREX) 0 3 % SOLN, Administer 1 drop to both eyes every 4 (four) hours while awake, Disp: 5 mL, Rfl: 0    Current Allergies     Allergies as of 03/01/2019 - Reviewed 03/01/2019   Allergen Reaction Noted    Levaquin [levofloxacin] Throat Swelling 02/23/2018    Penicillins Throat Swelling 02/23/2018            The following portions of the patient's history were reviewed and updated as appropriate: allergies, current medications, past family history, past medical history, past social history, past surgical history and problem list      Past Medical History:   Diagnosis Date    Asthma     [facility] 2/14/2018    Dysmenorrhea     Emphysema of lung ([facility] )     Hypertension     IBS (irritable bowel syndrome)     Liver lesion 1/9/2018    Menopausal symptoms     Other emphysema [facility] ) 2/23/2018    PTSD (post-traumatic stress disorder) 2/14/2018    Skin disorder     Tick bite        Past Surgical History:   Procedure Laterality Date    CHOLECYSTECTOMY      KNEE SURGERY      MICRODISCECTOMY LUMBAR  2003    TONSILLECTOMY  1986       Family History   Problem Relation Age of Onset    Bipolar disorder Mother     Diverticulitis Mother     Clotting disorder Mother         coagulation disorder; after hernia surg and had multiorgan failure- at [facility] age 71    Mental illness Mother         suicidal ideation   Deja Busby OCD Sister     Bipolar disorder Brother         affective    Substance Abuse Brother     Suicide Attempts Brother     Aortic aneurysm Other     Aortic aneurysm Family         abdominal    Diabetes Family     COPD Maternal Grandfather          Medications have been verified  Objective   There were no vitals taken for this visit  Physical Exam     Physical Exam   Constitutional: She appears well-developed and well-nourished  No distress  HENT:   Head: Normocephalic and atraumatic     Mouth/Throat: Oropharynx is clear and moist    B/l sinus congestion    Limited due to video visit   Eyes: Conjunctivae are normal    Cardiovascular:   Limited due to video visit   Pulmonary/Chest: Effort normal  No respiratory distress  Limited due to video visit   Skin: No rash noted  She is not diaphoretic  No pallor

## 2019-03-01 NOTE — PATIENT INSTRUCTIONS
Influenza   WHAT YOU NEED TO KNOW:   Influenza (the flu) is an infection caused by the influenza virus  The flu is easily spread when an infected person coughs, sneezes, or has close contact with others  You may be able to spread the flu to others for 1 week or longer after signs or symptoms appear  DISCHARGE INSTRUCTIONS:   Call 911 for any of the following:   · You have trouble breathing, and your lips look purple or blue  · You have a seizure  Return to the emergency department if:   · You are dizzy, or you are urinating less or not at all  · You have a headache with a stiff neck, and you feel tired or confused  · You have new pain or pressure in your chest     · Your symptoms, such as shortness of breath, vomiting, or diarrhea, get worse  · Your symptoms, such as fever and coughing, seem to get better, but then get worse  Contact your healthcare provider if:   · You have new muscle pain or weakness  · You have questions or concerns about your condition or care  Medicines: You may need any of the following:  · Acetaminophen  decreases pain and fever  It is available without a doctor's order  Ask how much to take and how often to take it  Follow directions  Acetaminophen can cause liver damage if not taken correctly  · NSAIDs , such as ibuprofen, help decrease swelling, pain, and fever  This medicine is available with or without a doctor's order  NSAIDs can cause stomach bleeding or kidney problems in certain people  If you take blood thinner medicine, always ask your healthcare provider if NSAIDs are safe for you  Always read the medicine label and follow directions  · Antivirals  help fight a viral infection  · Take your medicine as directed  Contact your healthcare provider if you think your medicine is not helping or if you have side effects  Tell him or her if you are allergic to any medicine  Keep a list of the medicines, vitamins, and herbs you take   Include the amounts, and when and why you take them  Bring the list or the pill bottles to follow-up visits  Carry your medicine list with you in case of an emergency  Rest  as much as you can to help you recover  Drink liquids as directed  to help prevent dehydration  Ask how much liquid to drink each day and which liquids are best for you  Prevent the spread of influenza:   · Wash your hands often  Use soap and water  Wash your hands after you use the bathroom, change a child's diapers, or sneeze  Wash your hands before you prepare or eat food  Use gel hand cleanser when soap and water are not available  Do not touch your eyes, nose, or mouth unless you have washed your hands first            · Cover your mouth when you sneeze or cough  Cough into a tissue or the bend of your arm  · Clean shared items with a germ-killing   Clean table surfaces, doorknobs, and light switches  Do not share towels, silverware, and dishes with people who are sick  Wash bed sheets, towels, silverware, and dishes with soap and water  · Wear a mask  over your mouth and nose if you are sick or are near anyone who is sick  · Stay away from others  if you are sick  · Influenza vaccine  helps prevent influenza (flu)  Everyone older than 6 months should get a yearly influenza vaccine  Get the vaccine as soon as it is available, usually in September or October each year  Follow up with your healthcare provider as directed:  Write down your questions so you remember to ask them during your visits  © 2017 [address] [employer] Information is for End User's use only and may not be sold, redistributed or otherwise used for commercial purposes  All illustrations and images included in CareNotes® are the copyrighted property of [employer] , "[employer]  or Abdifatah Kenney  The above information is an  only  It is not intended as medical advice for individual conditions or treatments   Talk to your doctor, nurse or pharmacist before following any medical regimen to see if it is safe and effective for you

## 2019-03-01 NOTE — CARE ANYWHERE EVISITS
Visit Summary for Isael Rea - Gender: Female - Date of Birth: [id number]  Date: [id number] - Duration: 6 minutes  Patient: Isael Rea  Provider: Kurt Sullivan PA-C    Patient Contact Information  Address  [facility] [address] [place] [address] Drive; [address] [place] [address]  [phone]    Visit Topics  Cold [Added By: Self - 2019-03-01]  Fever [Added By: Self - 2019-03-01]  Flu-Like Symptoms [Added By: Self - 2019-03-01]    Conversation Transcripts     [Notification] You are connected with Kurt Sullivan PA-C, Urgent Care Specialist  [Notification] Isael Rea is located in [place]  [Notification] Isael Rea has shared health history         Diagnosis  Influenza due to other identified influenza virus    Procedures  Value: 99311 Code: CPT-4 UNLISTED E&M SERVICE    Medications Prescribed    No prescriptions ordered    Electronically signed by: Enrique De La Cruz(NPI [id number])

## 2019-03-04 ENCOUNTER — DOCUMENTATION (OUTPATIENT)
Dept: BEHAVIORAL/MENTAL HEALTH CLINIC | Facility: CLINIC | Age: 49
End: 2019-03-04

## 2019-03-04 NOTE — PROGRESS NOTES
Assessment/Plan:      There are no diagnoses linked to this encounter  Subjective:     Patient ID: Kaelyn Anne is a 51 y o  female  Outpatient Discharge Summary:   Admission Date: 2015  Winnie Ly was referred by former PT returning  Discharge Date: 3/4/19    Discharge Diagnosis:    No diagnosis found  Treating Physician: Dr Esther Méndez  Treatment Complications: None  Presenting Problem: Magdaleno Arroyo returned to treatment to deal with her mother's death and ongoing work issues     Course of treatment includes:    individual therapy   Treatment Progress: good  Criteria for Discharge: Did not return  Aftercare recommendations include None  Discharge Medications include:  Current Outpatient Medications:     albuterol (PROAIR HFA) 90 mcg/act inhaler, Inhale 2 puffs every 6 (six) hours as needed for wheezing or shortness of breath, Disp: 1 Inhaler, Rfl: 6    B Complex-Folic Acid (SUPER B COMPLEX MAXI) TABS, Take by mouth, Disp: , Rfl:     Cholecalciferol (VITAMIN D3) 1000 units CAPS, Take 2 capsules by mouth daily, Disp: , Rfl:     Fluticasone-Salmeterol (AIRDUO RESPICLICK 367/98) 637-32 MCG/ACT AEPB, Inhale 1 puff 2 (two) times a day, Disp: 3 each, Rfl: 3    Multiple Vitamin (MULTI-VITAMIN DAILY PO), Take 1 tablet by mouth daily, Disp: , Rfl:     sertraline (ZOLOFT) 100 mg tablet, Take 1 5 tablets (150 mg total) by mouth daily, Disp: 135 tablet, Rfl: 3    tobramycin (TOBREX) 0 3 % SOLN, Administer 1 drop to both eyes every 4 (four) hours while awake, Disp: 5 mL, Rfl: 0    Prognosis: good

## 2019-03-13 ENCOUNTER — TELEPHONE (OUTPATIENT)
Dept: OBGYN CLINIC | Facility: CLINIC | Age: 49
End: 2019-03-13

## 2019-05-22 DIAGNOSIS — J43.8 OTHER EMPHYSEMA (HCC): ICD-10-CM

## 2019-05-24 RX ORDER — FLUTICASONE PROPIONATE AND SALMETEROL 113; 14 UG/1; UG/1
POWDER, METERED RESPIRATORY (INHALATION)
Qty: 1 INHALER | Refills: 3 | Status: SHIPPED | OUTPATIENT
Start: 2019-05-24 | End: 2019-06-26 | Stop reason: SDUPTHER

## 2019-06-26 ENCOUNTER — OFFICE VISIT (OUTPATIENT)
Dept: FAMILY MEDICINE CLINIC | Facility: HOSPITAL | Age: 49
End: 2019-06-26

## 2019-06-26 VITALS
BODY MASS INDEX: 29.05 KG/M2 | OXYGEN SATURATION: 98 % | DIASTOLIC BLOOD PRESSURE: 70 MMHG | WEIGHT: 164 LBS | TEMPERATURE: 98.3 F | HEART RATE: 90 BPM | SYSTOLIC BLOOD PRESSURE: 110 MMHG

## 2019-06-26 DIAGNOSIS — J43.8 OTHER EMPHYSEMA (HCC): ICD-10-CM

## 2019-06-26 DIAGNOSIS — F41.9 ANXIETY: ICD-10-CM

## 2019-06-26 DIAGNOSIS — F41.0 PANIC ATTACKS: ICD-10-CM

## 2019-06-26 DIAGNOSIS — F33.1 MDD (MAJOR DEPRESSIVE DISORDER), RECURRENT EPISODE, MODERATE (HCC): ICD-10-CM

## 2019-06-26 DIAGNOSIS — F43.10 PTSD (POST-TRAUMATIC STRESS DISORDER): ICD-10-CM

## 2019-06-26 PROCEDURE — 99212 OFFICE O/P EST SF 10 MIN: CPT | Performed by: PHYSICIAN ASSISTANT

## 2019-06-26 RX ORDER — FLUTICASONE PROPIONATE AND SALMETEROL 113; 14 UG/1; UG/1
1 POWDER, METERED RESPIRATORY (INHALATION) 2 TIMES DAILY
Qty: 3 INHALER | Refills: 3 | Status: SHIPPED | OUTPATIENT
Start: 2019-06-26 | End: 2020-02-04

## 2019-06-26 RX ORDER — SERTRALINE HYDROCHLORIDE 100 MG/1
150 TABLET, FILM COATED ORAL DAILY
Qty: 135 TABLET | Refills: 3 | Status: SHIPPED | OUTPATIENT
Start: 2019-06-26 | End: 2019-06-26 | Stop reason: SDUPTHER

## 2019-06-26 RX ORDER — FLUTICASONE PROPIONATE AND SALMETEROL 113; 14 UG/1; UG/1
1 POWDER, METERED RESPIRATORY (INHALATION) 2 TIMES DAILY
Qty: 1 INHALER | Refills: 3 | Status: SHIPPED | OUTPATIENT
Start: 2019-06-26 | End: 2019-06-26 | Stop reason: SDUPTHER

## 2019-06-26 RX ORDER — SERTRALINE HYDROCHLORIDE 100 MG/1
150 TABLET, FILM COATED ORAL DAILY
Qty: 135 TABLET | Refills: 3 | Status: SHIPPED | OUTPATIENT
Start: 2019-06-26 | End: 2020-03-06 | Stop reason: SDUPTHER

## 2019-11-15 ENCOUNTER — OFFICE VISIT (OUTPATIENT)
Dept: FAMILY MEDICINE CLINIC | Facility: HOSPITAL | Age: 49
End: 2019-11-15
Payer: COMMERCIAL

## 2019-11-15 ENCOUNTER — TELEPHONE (OUTPATIENT)
Dept: FAMILY MEDICINE CLINIC | Facility: HOSPITAL | Age: 49
End: 2019-11-15

## 2019-11-15 VITALS
BODY MASS INDEX: 29.34 KG/M2 | SYSTOLIC BLOOD PRESSURE: 122 MMHG | TEMPERATURE: 99 F | OXYGEN SATURATION: 98 % | WEIGHT: 165.6 LBS | DIASTOLIC BLOOD PRESSURE: 72 MMHG | HEIGHT: 63 IN | HEART RATE: 127 BPM

## 2019-11-15 DIAGNOSIS — J40 TRACHEOBRONCHITIS: ICD-10-CM

## 2019-11-15 DIAGNOSIS — Z12.39 SCREENING FOR BREAST CANCER: ICD-10-CM

## 2019-11-15 DIAGNOSIS — J01.00 ACUTE NON-RECURRENT MAXILLARY SINUSITIS: Primary | ICD-10-CM

## 2019-11-15 DIAGNOSIS — M79.671 RIGHT FOOT PAIN: ICD-10-CM

## 2019-11-15 DIAGNOSIS — J43.8 OTHER EMPHYSEMA (HCC): ICD-10-CM

## 2019-11-15 DIAGNOSIS — E78.5 DYSLIPIDEMIA: ICD-10-CM

## 2019-11-15 PROCEDURE — 99214 OFFICE O/P EST MOD 30 MIN: CPT | Performed by: INTERNAL MEDICINE

## 2019-11-15 RX ORDER — DOXYCYCLINE 100 MG/1
100 CAPSULE ORAL 2 TIMES DAILY
Qty: 20 CAPSULE | Refills: 0 | Status: SHIPPED | OUTPATIENT
Start: 2019-11-15 | End: 2019-11-25

## 2019-11-15 NOTE — TELEPHONE ENCOUNTER
Pt requested written Rx for her doxycycline  Prescription was sent electronically to mail order  Called mail order and spoke with Spencer Barba asking to have Rx cancelled

## 2019-11-15 NOTE — PROGRESS NOTES
Assessment/Plan:             Problem List Items Addressed This Visit        Respiratory    Emphysema ([address] )      Other Visit Diagnoses     Acute non-recurrent maxillary sinusitis    -  Primary    Right foot pain        Tracheobronchitis                Subjective:      Patient ID: Roby Rocha is a 47 y o  female    1  Cough- started 5 days ago and had sinus pressure- had some ear pain and now deep in chest Some thick sputum and sob- has large globs of mucous  2  Right foot injury- had been doing a new addition to bathroom- had injured right foot- ? Stress fracture- now inboot and trying to goet mri approved by her insurance- we do not have any records from that physician      The following portions of the patient's history were reviewed and updated as appropriate: allergies, current medications and problem list      Review of Systems   Constitutional: Positive for chills  Negative for fever  HENT: Positive for postnasal drip and sinus pressure  Maxillary sinus pressure bialterally   Respiratory: Positive for cough  All other systems reviewed and are negative          Objective:      Current Outpatient Medications:     albuterol (PROAIR HFA) 90 mcg/act inhaler, Inhale 2 puffs every 6 (six) hours as needed for wheezing or shortness of breath, Disp: 1 Inhaler, Rfl: 6    B Complex-Folic Acid (SUPER B COMPLEX MAXI) TABS, Take by mouth, Disp: , Rfl:     Cholecalciferol (VITAMIN D3) 1000 units CAPS, Take 2 capsules by mouth daily, Disp: , Rfl:     fluticasone-salmeterol (AIRDUO RESPICLICK) 142-14 mcg/act dry powder inhaler, Inhale 1 puff 2 (two) times a day Rinse mouth after use , Disp: 3 Inhaler, Rfl: 3    Multiple Vitamin (MULTI-VITAMIN DAILY PO), Take 1 tablet by mouth daily, Disp: , Rfl:     sertraline (ZOLOFT) 100 mg tablet, Take 1 5 tablets (150 mg total) by mouth daily, Disp: 135 tablet, Rfl: 3    tobramycin (TOBREX) 0 3 % SOLN, Administer 1 drop to both eyes every 4 (four) hours while awake (Patient not taking: Reported on 11/15/2019), Disp: 5 mL, Rfl: 0    Blood pressure 122/72, pulse (!) 127, temperature 99 °F (37 2 °C), temperature source Tympanic, height 5' 3" (1 6 m), weight 75 1 kg (165 lb 9 6 oz), SpO2 98 %  Physical Exam   Constitutional:   Ill appearing- having spasms of coughing   HENT:   Head: Normocephalic  Right Ear: External ear normal    Left Ear: External ear normal    Mild pharyngeal injection   Eyes: Right eye exhibits no discharge  Left eye exhibits discharge  Cardiovascular: Normal rate and normal heart sounds  No murmur heard  Pulmonary/Chest: She has wheezes  Upper airway rhonchi   Abdominal: Soft  Bowel sounds are normal  She exhibits no distension  There is no tenderness  Lymphadenopathy:     She has cervical adenopathy  Nursing note and vitals reviewed

## 2019-11-15 NOTE — PATIENT INSTRUCTIONS
"Patient seen and examined    Blood pressure (!) 93/54, pulse 96, temperature 36.3 °C (97.4 °F), resp. rate 20, height 1.549 m (5' 1\"), weight 63.7 kg (140 lb 6.9 oz), SpO2 96 %.    Recent Labs      05/10/18   0238  05/11/18   0259   WBC  11.0*  13.1*   RBC  3.56*  3.57*   HEMOGLOBIN  8.1*  8.2*   HEMATOCRIT  26.0*  26.2*   MCV  73.0*  73.4*   MCH  22.8*  23.0*   MCHC  31.2*  31.3*   RDW  39.7  39.2   PLATELETCT  282  329   MPV  10.3  9.8       No acute distress  Dressing clean dry and intact  Neurovascularly intact    Plan:  DVT Prophylaxis- TEDS/SCDs  Weight Bearing Status-TTWB RLE, NWNB RUE  PT/OT  Antibiotics: None  Case Coordination          " Get copy of colonscopy from [facility] and update our records- has hx of polyps

## 2020-01-02 ENCOUNTER — TELEPHONE (OUTPATIENT)
Dept: GASTROENTEROLOGY | Facility: CLINIC | Age: 50
End: 2020-01-02

## 2020-01-23 ENCOUNTER — OFFICE VISIT (OUTPATIENT)
Dept: FAMILY MEDICINE CLINIC | Facility: HOSPITAL | Age: 50
End: 2020-01-23
Payer: COMMERCIAL

## 2020-01-23 VITALS
HEART RATE: 83 BPM | BODY MASS INDEX: 30.12 KG/M2 | WEIGHT: 170 LBS | SYSTOLIC BLOOD PRESSURE: 120 MMHG | DIASTOLIC BLOOD PRESSURE: 68 MMHG | HEIGHT: 63 IN | TEMPERATURE: 98.8 F

## 2020-01-23 DIAGNOSIS — Z23 NEED FOR PNEUMOCOCCAL VACCINATION: ICD-10-CM

## 2020-01-23 DIAGNOSIS — Z23 NEED FOR INFLUENZA VACCINATION: ICD-10-CM

## 2020-01-23 DIAGNOSIS — J43.8 OTHER EMPHYSEMA (HCC): ICD-10-CM

## 2020-01-23 DIAGNOSIS — F43.10 PTSD (POST-TRAUMATIC STRESS DISORDER): ICD-10-CM

## 2020-01-23 DIAGNOSIS — J44.1 ACUTE EXACERBATION OF COPD WITH ASTHMA (HCC): ICD-10-CM

## 2020-01-23 DIAGNOSIS — Z72.0 TOBACCO ABUSE: ICD-10-CM

## 2020-01-23 DIAGNOSIS — R53.83 FATIGUE, UNSPECIFIED TYPE: ICD-10-CM

## 2020-01-23 DIAGNOSIS — F33.1 MDD (MAJOR DEPRESSIVE DISORDER), RECURRENT EPISODE, MODERATE (HCC): ICD-10-CM

## 2020-01-23 DIAGNOSIS — F51.01 PRIMARY INSOMNIA: ICD-10-CM

## 2020-01-23 DIAGNOSIS — J98.8 CONGESTION OF UPPER AIRWAY: Primary | ICD-10-CM

## 2020-01-23 DIAGNOSIS — J45.901 ACUTE EXACERBATION OF COPD WITH ASTHMA (HCC): ICD-10-CM

## 2020-01-23 PROCEDURE — 3008F BODY MASS INDEX DOCD: CPT | Performed by: INTERNAL MEDICINE

## 2020-01-23 PROCEDURE — 99214 OFFICE O/P EST MOD 30 MIN: CPT | Performed by: INTERNAL MEDICINE

## 2020-01-23 PROCEDURE — 90682 RIV4 VACC RECOMBINANT DNA IM: CPT

## 2020-01-23 PROCEDURE — 90471 IMMUNIZATION ADMIN: CPT

## 2020-01-23 PROCEDURE — 90732 PPSV23 VACC 2 YRS+ SUBQ/IM: CPT

## 2020-01-23 PROCEDURE — 90472 IMMUNIZATION ADMIN EACH ADD: CPT

## 2020-01-23 RX ORDER — PREDNISONE 10 MG/1
TABLET ORAL
Qty: 15 TABLET | Refills: 0 | Status: SHIPPED | OUTPATIENT
Start: 2020-01-23 | End: 2020-04-21 | Stop reason: ALTCHOICE

## 2020-01-23 RX ORDER — VARENICLINE TARTRATE 25 MG
KIT ORAL
Qty: 53 TABLET | Refills: 0 | Status: SHIPPED | OUTPATIENT
Start: 2020-01-23 | End: 2020-02-17

## 2020-01-23 NOTE — ASSESSMENT & PLAN NOTE
Still smoking- had tried wellbutrin - felt wacky-  Father had tried chantix so she was concerned that she would have side effects   She has hx of vivid dreams

## 2020-01-23 NOTE — ASSESSMENT & PLAN NOTE
Exhausted as her inhalers were not working- has a teva generic    sees Dr Duarte Mayer  Was tight and unable to work yesterday due to that

## 2020-01-23 NOTE — PROGRESS NOTES
Assessment/Plan:             Problem List Items Addressed This Visit        Respiratory    Emphysema ([address] )     Exhausted as her inhalers were not working- has a teva generic    sees Dr Janet Royal  Was tight and unable to work yesterday due to that  Other Visit Diagnoses     Congestion of upper airway    -  Primary            Subjective:      Patient ID: Shakir Fallon is a 47 y o  female    1  Sob- recent issues with her generic inhaler not  Working and felt tight- will have her see Dr Janet Royal  2  Sleep issues- has trouble falling asleep - not until 1-2- wakes then at 4 am then not able to fall back to sleep- did have 8 1/2 hours one night last week but still felt exhausted when  She got up  Falls asleep at computer at work  - has had issues for years  now has coughing spasms all night  The following portions of the patient's history were reviewed and updated as appropriate: allergies, current medications and problem list      Review of Systems   Constitutional: Positive for fatigue  HENT: Positive for congestion  Respiratory: Positive for cough and shortness of breath  Cardiovascular: Negative for chest pain  Has occasional racing heart beats- on awakening at night   All other systems reviewed and are negative          Objective:      Current Outpatient Medications:     albuterol (PROAIR HFA) 90 mcg/act inhaler, Inhale 2 puffs every 6 (six) hours as needed for wheezing or shortness of breath, Disp: 1 Inhaler, Rfl: 6    B Complex-Folic Acid (SUPER B COMPLEX MAXI) TABS, Take by mouth, Disp: , Rfl:     Cholecalciferol (VITAMIN D3) 1000 units CAPS, Take 2 capsules by mouth daily, Disp: , Rfl:     fluticasone-salmeterol (AIRDUO RESPICLICK) 207-27 mcg/act dry powder inhaler, Inhale 1 puff 2 (two) times a day Rinse mouth after use , Disp: 3 Inhaler, Rfl: 3    Multiple Vitamin (MULTI-VITAMIN DAILY PO), Take 1 tablet by mouth daily, Disp: , Rfl:     sertraline (ZOLOFT) 100 mg tablet, Take 1 5 tablets (150 mg total) by mouth daily, Disp: 135 tablet, Rfl: 3    Blood pressure 120/68, pulse 83, temperature 98 8 °F (37 1 °C), temperature source Tympanic, height 5' 3" (1 6 m), weight 77 1 kg (170 lb)  Physical Exam   Constitutional: She is oriented to person, place, and time  She appears well-developed  She appears distressed  Dry upper airway cough   HENT:   Head: Normocephalic  Right Ear: External ear normal    Left Ear: External ear normal    Dry mouth, hoarseness   Neck: No thyromegaly present  Cardiovascular: Normal rate and regular rhythm  No murmur heard  Pulmonary/Chest: She is in respiratory distress  Scattered rhonchi in all fields   Abdominal: Soft  Bowel sounds are normal  She exhibits no distension  There is no tenderness  Musculoskeletal: She exhibits no edema or tenderness  Lymphadenopathy:     She has no cervical adenopathy  Neurological: She is alert and oriented to person, place, and time  Nursing note and vitals reviewed

## 2020-01-24 ENCOUNTER — TELEPHONE (OUTPATIENT)
Dept: FAMILY MEDICINE CLINIC | Facility: HOSPITAL | Age: 50
End: 2020-01-24

## 2020-01-29 ENCOUNTER — HOSPITAL ENCOUNTER (OUTPATIENT)
Dept: BONE DENSITY | Facility: CLINIC | Age: 50
Discharge: HOME | End: 2020-01-29

## 2020-01-29 DIAGNOSIS — Z12.39 SCREENING FOR BREAST CANCER: ICD-10-CM

## 2020-02-04 ENCOUNTER — OFFICE VISIT (OUTPATIENT)
Dept: PULMONOLOGY | Facility: CLINIC | Age: 50
End: 2020-02-04
Payer: COMMERCIAL

## 2020-02-04 VITALS
OXYGEN SATURATION: 96 % | HEART RATE: 85 BPM | WEIGHT: 171.6 LBS | BODY MASS INDEX: 30.41 KG/M2 | DIASTOLIC BLOOD PRESSURE: 90 MMHG | HEIGHT: 63 IN | TEMPERATURE: 97.4 F | SYSTOLIC BLOOD PRESSURE: 138 MMHG

## 2020-02-04 DIAGNOSIS — G47.9 FATIGUE DUE TO SLEEP PATTERN DISTURBANCE: Primary | ICD-10-CM

## 2020-02-04 DIAGNOSIS — J43.8 OTHER EMPHYSEMA (HCC): ICD-10-CM

## 2020-02-04 DIAGNOSIS — J44.1 ACUTE EXACERBATION OF COPD WITH ASTHMA (HCC): ICD-10-CM

## 2020-02-04 DIAGNOSIS — R53.83 FATIGUE DUE TO SLEEP PATTERN DISTURBANCE: Primary | ICD-10-CM

## 2020-02-04 DIAGNOSIS — J45.901 ACUTE EXACERBATION OF COPD WITH ASTHMA (HCC): ICD-10-CM

## 2020-02-04 DIAGNOSIS — Z72.0 TOBACCO ABUSE: ICD-10-CM

## 2020-02-04 PROCEDURE — 3008F BODY MASS INDEX DOCD: CPT | Performed by: INTERNAL MEDICINE

## 2020-02-04 PROCEDURE — 99214 OFFICE O/P EST MOD 30 MIN: CPT | Performed by: INTERNAL MEDICINE

## 2020-02-04 RX ORDER — FLUTICASONE FUROATE AND VILANTEROL 200; 25 UG/1; UG/1
1 POWDER RESPIRATORY (INHALATION) DAILY
Qty: 1 INHALER | Refills: 6 | Status: SHIPPED | OUTPATIENT
Start: 2020-02-04 | End: 2020-02-04

## 2020-02-04 RX ORDER — FLUTICASONE FUROATE AND VILANTEROL 200; 25 UG/1; UG/1
1 POWDER RESPIRATORY (INHALATION) DAILY
Qty: 1 INHALER | Refills: 6 | Status: SHIPPED | OUTPATIENT
Start: 2020-02-04 | End: 2020-03-11 | Stop reason: SDUPTHER

## 2020-02-04 NOTE — ASSESSMENT & PLAN NOTE
The patient is having significant daytime fatigue resulting in her falling asleep at home when she is supposed to be working  She is snoring at night and has been told she has witnessed apneas  She is also concerned about restless legs syndrome  She has also been having some trouble falling back asleep after she wakes up at night  I think 1st step is for us to do an in-lab sleep study to assess for sleep apnea  After testing she will follow up with Dr Vicky Slulivan our sleep doctor for further evaluation

## 2020-02-04 NOTE — PATIENT INSTRUCTIONS
-download smoking cessation jeanette on smart phone    -switch AirDuo to Breo 1 puff once daily-rinse mouth out after use

## 2020-02-04 NOTE — ASSESSMENT & PLAN NOTE
The patient appears to be having some worsening symptoms from a respiratory standpoint  She was recently requiring prednisone with good improvement  Obviously the most important thing is that she stop smoking  She started Chantix, and will download some free Erlinda on her phone  She is not interested in coming to the smoking cessation Clinic at this time  I do believe she would benefit from switching from air duo to Breo 1 puff once daily  She is unable to do pulmonary rehab based off of work schedule at this time  She is not appropriate for lung cancer screening CT based off of her age

## 2020-02-04 NOTE — PROGRESS NOTES
Assessment:    Emphysema ([facility] )   The patient appears to be having some worsening symptoms from a respiratory standpoint  She was recently requiring prednisone with good improvement  Obviously the most important thing is that she stop smoking  She started Chantix, and will download some free Erlinda on her phone  She is not interested in coming to the smoking cessation Clinic at this time  I do believe she would benefit from switching from air duo to Breo 1 puff once daily  She is unable to do pulmonary rehab based off of work schedule at this time  She is not appropriate for lung cancer screening CT based off of her age  Tobacco abuse    As per above she started taking Chantix last week and is working aggressively to stop smoking    Fatigue due to sleep pattern disturbance   The patient is having significant daytime fatigue resulting in her falling asleep at home when she is supposed to be working  She is snoring at night and has been told she has witnessed apneas  She is also concerned about restless legs syndrome  She has also been having some trouble falling back asleep after she wakes up at night  I think 1st step is for us to do an in-lab sleep study to assess for sleep apnea  After testing she will follow up with Dr Soto Persons our sleep doctor for further evaluation  Plan:    Diagnoses and all orders for this visit:    Fatigue due to sleep pattern disturbance  -     Discontinue: fluticasone-vilanterol (BREO ELLIPTA) 200-25 MCG/INH inhaler; Inhale 1 puff daily Rinse mouth after use  -     Diagnostic Sleep Study; Future    Emphysema ([facility] )  -     Ambulatory referral to Pulmonology  -     Discontinue: fluticasone-vilanterol (BREO ELLIPTA) 200-25 MCG/INH inhaler; Inhale 1 puff daily Rinse mouth after use  -     Diagnostic Sleep Study; Future  -     fluticasone-vilanterol (BREO ELLIPTA) 200-25 MCG/INH inhaler; Inhale 1 puff daily Rinse mouth after use      Tobacco abuse  -     Ambulatory referral to Pulmonology    Acute exacerbation of COPD with asthma ([facility] )  -     Ambulatory referral to Pulmonology        Follow-up:   3 months      HPI:  She has been having increased dyspnea at rest and with exertion, chest tightness and wheezing  She has been coughing up phlegm - white phlegm  No hemoptysis    She was seen by PCP and started on prednisone at the end of Jan and feels much better  She is on Air Duo BID but has had several inhalers that haven't worked   She had been using it only in the AM because she was worried about it keeping her up at night    She started Chantix 1 week ago - no side effects  Down to 12-15 cigarettes per day  She is going to also re-listen to hypnotism tapes    Excessive day time sleepiness  She snores and has been told that she stops breathing  Also concerned for restless leg      Review of Systems   Constitutional: Positive for fatigue  Negative for appetite change and unexpected weight change  Respiratory: Positive for cough, shortness of breath and wheezing  Cardiovascular: Negative for chest pain and leg swelling  Musculoskeletal: Positive for arthralgias and gait problem  Skin: Negative for rash         The following portions of the patient's history were reviewed and updated as appropriate: allergies, current medications, past family history, past medical history, past social history, past surgical history and problem list     VITALS:  Vitals:    02/04/20 1515   BP: 138/90   BP Location: Right arm   Patient Position: Sitting   Pulse: 85   Temp: (!) 97 4 °F (36 3 °C)   TempSrc: Tympanic   SpO2: 96%   Weight: 77 8 kg (171 lb 9 6 oz)   Height: 5' 3" (1 6 m)       Physical Exam  General:  Patient is awake, alert, non-toxic and in no acute respiratory distress  Neck: No JVD  CV:  Regular, +S1 and S2, No murmurs, gallops or rubs appreciated  Lungs:  Clear to auscultation bilateral without wheeze, rales or rhonchi  Abdomen: Soft, +BS, Non-tender, non-distended  Extremities: No clubbing, cyanosis or edema  Neuro: No focal deficits        Diagnostic Testing:    CBC:  Lab Results   Component Value Date    WBC 10 1 01/24/2017    HGB 14 4 01/24/2017    HCT 44 2 01/24/2017    MCV 94 5 01/24/2017     01/24/2017         BMP:   Lab Results   Component Value Date     01/24/2017    K 4 2 01/24/2017     01/24/2017    CO2 26 01/24/2017    BUN 10 01/24/2017    CREATININE 0 87 01/24/2017    CALCIUM 9 5 01/24/2017    AST 16 01/24/2017    ALT 15 01/24/2017    ALKPHOS 92 01/24/2017    PROT 6 6 01/24/2017    BILITOT 0 3 01/24/2017         Sarah Lechuga DO

## 2020-02-13 ENCOUNTER — TELEPHONE (OUTPATIENT)
Dept: SLEEP CENTER | Facility: CLINIC | Age: 50
End: 2020-02-13

## 2020-02-13 NOTE — TELEPHONE ENCOUNTER
----- Message from Khushi Tracey MD sent at 2/12/2020  6:09 PM EST -----  Approved  ----- Message -----  From: Merissa Abdi: 2/5/2020  12:11 PM EST  To: Sleep Medicine [facility] [place] [facility], #    PLEASE REVIEW FOR APPROVAL OR DENIAL AND WHY

## 2020-02-14 ENCOUNTER — TELEPHONE (OUTPATIENT)
Dept: FAMILY MEDICINE CLINIC | Facility: HOSPITAL | Age: 50
End: 2020-02-14

## 2020-02-14 DIAGNOSIS — Z87.898 HISTORY OF ABNORMAL MAMMOGRAM: Primary | ICD-10-CM

## 2020-02-17 DIAGNOSIS — Z72.0 TOBACCO ABUSE: Primary | ICD-10-CM

## 2020-02-17 RX ORDER — VARENICLINE TARTRATE 0.5 MG/1
0.5 TABLET, FILM COATED ORAL 2 TIMES DAILY
Qty: 60 TABLET | Refills: 5 | Status: SHIPPED | OUTPATIENT
Start: 2020-02-17 | End: 2020-04-21 | Stop reason: ALTCHOICE

## 2020-03-03 ENCOUNTER — TELEPHONE (OUTPATIENT)
Dept: PULMONOLOGY | Facility: CLINIC | Age: 50
End: 2020-03-03

## 2020-03-03 DIAGNOSIS — G47.30 INSOMNIA WITH SLEEP APNEA: ICD-10-CM

## 2020-03-03 DIAGNOSIS — G47.9 REPETITIVE INTRUSIONS OF SLEEP: Primary | ICD-10-CM

## 2020-03-03 DIAGNOSIS — G47.00 INSOMNIA WITH SLEEP APNEA: ICD-10-CM

## 2020-03-03 DIAGNOSIS — R53.83 FATIGUE, UNSPECIFIED TYPE: ICD-10-CM

## 2020-03-03 NOTE — TELEPHONE ENCOUNTER
I have called patient to inform her that her insurance is not going to pay for an in-lab sleep study but they will pay for a home sleep study  I have canceled her diagnostic study that was scheduled for Sunday and scheduled her for a home sleep study on 3/6/20 in [facility]  Patient verbalized her understanding

## 2020-03-05 ENCOUNTER — TELEPHONE (OUTPATIENT)
Dept: FAMILY MEDICINE CLINIC | Facility: HOSPITAL | Age: 50
End: 2020-03-05

## 2020-03-05 NOTE — TELEPHONE ENCOUNTER
Pt states that she was on a starter pack of Chantix that went from   5 to 1 mg  A script was called in for her recently that is   5  Pt thought the script should be for 1 mg    Pt can be reached at 21

## 2020-03-06 ENCOUNTER — HOSPITAL ENCOUNTER (OUTPATIENT)
Dept: SLEEP CENTER | Facility: HOSPITAL | Age: 50
Discharge: HOME | End: 2020-03-06
Attending: INTERNAL MEDICINE
Payer: COMMERCIAL

## 2020-03-06 DIAGNOSIS — G47.9 REPETITIVE INTRUSIONS OF SLEEP: ICD-10-CM

## 2020-03-06 DIAGNOSIS — F33.1 MDD (MAJOR DEPRESSIVE DISORDER), RECURRENT EPISODE, MODERATE (HCC): ICD-10-CM

## 2020-03-06 DIAGNOSIS — Z72.0 TOBACCO ABUSE: Primary | ICD-10-CM

## 2020-03-06 DIAGNOSIS — G47.00 INSOMNIA WITH SLEEP APNEA: ICD-10-CM

## 2020-03-06 DIAGNOSIS — F41.0 PANIC ATTACKS: ICD-10-CM

## 2020-03-06 DIAGNOSIS — F41.9 ANXIETY: ICD-10-CM

## 2020-03-06 DIAGNOSIS — R53.83 FATIGUE, UNSPECIFIED TYPE: ICD-10-CM

## 2020-03-06 DIAGNOSIS — F43.10 PTSD (POST-TRAUMATIC STRESS DISORDER): ICD-10-CM

## 2020-03-06 DIAGNOSIS — G47.30 INSOMNIA WITH SLEEP APNEA: ICD-10-CM

## 2020-03-06 PROCEDURE — G0399 HOME SLEEP TEST/TYPE 3 PORTA: HCPCS

## 2020-03-06 PROCEDURE — G0399 HOME SLEEP TEST/TYPE 3 PORTA: HCPCS | Performed by: INTERNAL MEDICINE

## 2020-03-06 RX ORDER — VARENICLINE TARTRATE 1 MG/1
1 TABLET, FILM COATED ORAL 2 TIMES DAILY
Qty: 60 TABLET | Refills: 2 | Status: SHIPPED | OUTPATIENT
Start: 2020-03-06 | End: 2020-09-15 | Stop reason: SDUPTHER

## 2020-03-06 RX ORDER — SERTRALINE HYDROCHLORIDE 100 MG/1
150 TABLET, FILM COATED ORAL DAILY
Qty: 135 TABLET | Refills: 3 | Status: SHIPPED | OUTPATIENT
Start: 2020-03-06 | End: 2021-02-08 | Stop reason: SDUPTHER

## 2020-03-07 NOTE — PROGRESS NOTES
Home Sleep Study Documentation    Pre-Sleep Home Study:    Set-up and instructions performed by: ANUSHKA Dodd    Technician performed demonstration for Patient: yes    Return demonstration performed by Patient: yes    Written instructions provided to Patient: yes    Patient signed consent form: yes        Post-Sleep Home Study:    Additional comments by Patient: Abby Albino to bed at 10pm did not fall asleep until about 11 pm    Home Sleep Study Failed:no:    Failure reason: N/A    Reported or Detected: N/A    Scored by: Maia Edward CRT, RPSGT

## 2020-03-11 DIAGNOSIS — J43.8 OTHER EMPHYSEMA (HCC): ICD-10-CM

## 2020-03-11 RX ORDER — FLUTICASONE FUROATE AND VILANTEROL 200; 25 UG/1; UG/1
1 POWDER RESPIRATORY (INHALATION) DAILY
Qty: 3 INHALER | Refills: 3 | Status: SHIPPED | OUTPATIENT
Start: 2020-03-11 | End: 2021-02-11

## 2020-03-16 ENCOUNTER — TELEMEDICINE (OUTPATIENT)
Dept: PULMONOLOGY | Facility: HOSPITAL | Age: 50
End: 2020-03-16

## 2020-03-16 DIAGNOSIS — G47.411 CATAPLEXY: ICD-10-CM

## 2020-03-16 DIAGNOSIS — G25.81 RESTLESS LEG SYNDROME: ICD-10-CM

## 2020-03-16 DIAGNOSIS — G47.8 SLEEP PARALYSIS: ICD-10-CM

## 2020-03-16 DIAGNOSIS — G47.33 OSA (OBSTRUCTIVE SLEEP APNEA): Primary | ICD-10-CM

## 2020-03-16 PROCEDURE — 99214 OFFICE O/P EST MOD 30 MIN: CPT | Performed by: INTERNAL MEDICINE

## 2020-03-16 NOTE — LETTER
March 16, 2020     Hanna Arevalo, [address]  [address] [address]    Patient: Ebony Mendoza   YOB: 1970   Date of Visit: 3/16/2020       Dear Dr Jean Carlos Winters: Thank you for referring Loli Sam to me for evaluation  Below are my notes for this consultation  If you have questions, please do not hesitate to call me  I look forward to following your patient along with you  Sincerely,        Gordo Murry DO        CC: No Recipients  Gordo Murry DO  3/16/2020  3:46 PM  Sign at close encounter    Virtual Check-in Visit    Reason for visit is sleep apnea    This virtual check-in was done via telephone  Encounter provider Gordo Murry DO    Provider located at [place] [address], [address] [place] [address]      Recent Visits  No visits were found meeting these conditions  Showing recent visits within past 7 days and meeting all other requirements     Future Appointments  No visits were found meeting these conditions  Showing future appointments within next 150 days and meeting all other requirements        Patient agrees to participate in a virtual check in via telephone or video visit instead of presenting to the office to address urgent/immediate medical needs  Patient is aware this is a billable service but at a lesser fee than in person care  After connecting through telephone, the patient was identified by name and date of birth  Ebony Mendoza was informed that this was a telemedicine visit and that the exam was being conducted confidentially over secure lines  My office door was closed  No one else was in the room  She acknowledged consent and understanding of privacy and security of the virtual check-in visit    I informed the patient that I have reviewed her record in Epic and presented the opportunity for her to ask any questions regarding the visit today  The patient initiated communication and agreed to participate  Subjective  HPI:  Ebony Mendoza is a 47 y o  female with PMHx as below who comes in for management of DANIA  Patient notes difficulty staying asleep, snoring, excessive daytime sleepines, awakenings with gasping, witnessed apneas, morning headaches, and awakenings with dry mouth  She also admits to memory and concentration issues  she note symptoms of restless legs  she also admits to symptoms of cataplexy, sleep paralysis but denies hypnopompic or hypnagogic hallucinations  Past Medical History:   Diagnosis Date    Asthma     Depression, major, recurrent ([facility] ) 2/14/2018    Dysmenorrhea     Emphysema of lung ([facility] )     Hypertension     IBS (irritable bowel syndrome)     Liver lesion 1/9/2018    Menopausal symptoms     Other emphysema ([facility] ) 2/23/2018    PTSD (post-traumatic stress disorder) 2/14/2018    Skin disorder     Tick bite        Past Surgical History:   Procedure Laterality Date    CHOLECYSTECTOMY      KNEE SURGERY      MICRODISCECTOMY LUMBAR  2003    TONSILLECTOMY  1986       Current Outpatient Medications   Medication Sig Dispense Refill    albuterol (PROAIR HFA) 90 mcg/act inhaler Inhale 2 puffs every 6 (six) hours as needed for wheezing or shortness of breath 1 Inhaler 6    B Complex-Folic Acid (SUPER B COMPLEX MAXI) TABS Take by mouth      Cholecalciferol (VITAMIN D3) 1000 units CAPS Take 2 capsules by mouth daily      fluticasone-vilanterol (BREO ELLIPTA) 200-25 MCG/INH inhaler Inhale 1 puff daily Rinse mouth after use   3 Inhaler 3    Multiple Vitamin (MULTI-VITAMIN DAILY PO) Take 1 tablet by mouth daily      predniSONE 10 mg tablet 1 bid for 5 days then 1 daily 15 tablet 0    sertraline (ZOLOFT) 100 mg tablet Take 1 5 tablets (150 mg total) by mouth daily 135 tablet 3    varenicline (CHANTIX) 0 5 mg tablet Take 1 tablet (0 5 mg total) by mouth 2 (two) times a day 60 tablet 5    varenicline (CHANTIX) 1 mg tablet Take 1 tablet (1 mg total) by mouth 2 (two) times a day 60 tablet 2     No current facility-administered medications for this visit  Allergies   Allergen Reactions    Levaquin [Levofloxacin] Throat Swelling    Penicillins Throat Swelling       Assessment/Plan  47 y o  F with PMHx of COPD, PTSD, panic attacks who called to discuss her newly diagnosed sleep apnea  1   Mild DANIA ([facility] - 10)       -  Start autoPAP 5-20 cc of H2O presure      -  I discussed in depth the diagnostic studies and treatment options involved with obstructive sleep apnea      -  I also discussed in depth the risk of leaving sleep apnea untreated including hypertension, heart failure, arrhythmia, MI and stroke  -  The patient is agreeable to undergo testing and treatment of obstructive sleep apnea  She understands that pitfalls she may encounter along the way and is willing to attempt CPAP treatment  2  Restless legs/Periodic limb movement       -  Treat DANIA as above       -  Check CBC, Iron studies, B12/Folic acid, magnesium to ensure that is not a cause for PLMs       -  If daytime sleepiness persists after the treatment of DANIA, will consider treatment with Neurontin, Pregabalin, Requip or Mirapex        -  May benefit from Neurontin to help with chronic pain as well  3   Possible cataplexy and sleep paralysis symptoms - It is unclear if she truly has narcolepsy       -  Trial CPAP and treatment of RLS first   If this does not help sleepiness, I would recommend a CPAP with MSLT  4   COPD - managed by Dr Fadi Hernandez  I spent 30 minutes with the patient during this virtual check-in visit

## 2020-03-16 NOTE — PROGRESS NOTES
Virtual Check-in Visit    Reason for visit is sleep apnea    This virtual check-in was done via telephone  Encounter provider Brittney Camacho DO    Provider located at [address] [place] [address]      Recent Visits  No visits were found meeting these conditions  Showing recent visits within past 7 days and meeting all other requirements     Future Appointments  No visits were found meeting these conditions  Showing future appointments within next 150 days and meeting all other requirements        Patient agrees to participate in a virtual check in via telephone or video visit instead of presenting to the office to address urgent/immediate medical needs  Patient is aware this is a billable service but at a lesser fee than in person care  After connecting through telephone, the patient was identified by name and date of birth  Chani Brown was informed that this was a telemedicine visit and that the exam was being conducted confidentially over secure lines  My office door was closed  No one else was in the room  She acknowledged consent and understanding of privacy and security of the virtual check-in visit  I informed the patient that I have reviewed her record in Epic and presented the opportunity for her to ask any questions regarding the visit today  The patient initiated communication and agreed to participate  Subjective  HPI:  Chani Brown is a 47 y o  female with PMHx as below who comes in for management of DANIA  Patient notes difficulty staying asleep, snoring, excessive daytime sleepines, awakenings with gasping, witnessed apneas, morning headaches, and awakenings with dry mouth  She also admits to memory and concentration issues  she note symptoms of restless legs    she also admits to symptoms of cataplexy, sleep paralysis but denies hypnopompic or hypnagogic hallucinations  Past Medical History:   Diagnosis Date    Asthma     Depression, major, recurrent ([facility] ) 2/14/2018    Dysmenorrhea     [facility]ma [facility] lung ([facility] )     Hypertension     IBS (irritable bowel syndrome)     Liver lesion 1/9/2018    Menopausal symptoms     Other emphysema ([facility] ) 2/23/2018    PTSD (post-traumatic stress disorder) 2/14/2018    Skin disorder     Tick bite        Past Surgical History:   Procedure Laterality Date    CHOLECYSTECTOMY      KNEE SURGERY      MICRODISCECTOMY LUMBAR  2003    TONSILLECTOMY  1986       Current Outpatient Medications   Medication Sig Dispense Refill    albuterol (PROAIR HFA) 90 mcg/act inhaler Inhale 2 puffs every 6 (six) hours as needed for wheezing or shortness of breath 1 Inhaler 6    B Complex-Folic Acid (SUPER B COMPLEX MAXI) TABS Take by mouth      Cholecalciferol (VITAMIN D3) 1000 units CAPS Take 2 capsules by mouth daily      fluticasone-vilanterol (BREO ELLIPTA) 200-25 MCG/INH inhaler Inhale 1 puff daily Rinse mouth after use  3 Inhaler 3    Multiple Vitamin (MULTI-VITAMIN DAILY PO) Take 1 tablet by mouth daily      predniSONE 10 mg tablet 1 bid for 5 days then 1 daily 15 tablet 0    sertraline (ZOLOFT) 100 mg tablet Take 1 5 tablets (150 mg total) by mouth daily 135 tablet 3    varenicline (CHANTIX) 0 5 mg tablet Take 1 tablet (0 5 mg total) by mouth 2 (two) times a day 60 tablet 5    varenicline (CHANTIX) 1 mg tablet Take 1 tablet (1 mg total) by mouth 2 (two) times a day 60 tablet 2     No current facility-administered medications for this visit  Allergies   Allergen Reactions    Levaquin [Levofloxacin] Throat Swelling    Penicillins Throat Swelling       Assessment/Plan  47 y o  F with PMHx of COPD, PTSD, panic attacks who called to discuss her newly diagnosed sleep apnea    1   Mild DANIA ([facility] - 10)       -  Start autoPAP 5-20 cc of H2O presure      -  I discussed in depth the diagnostic studies and treatment options involved with obstructive sleep apnea      -  I also discussed in depth the risk of leaving sleep apnea untreated including hypertension, heart failure, arrhythmia, MI and stroke  -  The patient is agreeable to undergo testing and treatment of obstructive sleep apnea  She understands that pitfalls she may encounter along the way and is willing to attempt CPAP treatment  2  Restless legs/Periodic limb movement       -  Treat DANIA as above       -  Check CBC, Iron studies, B12/Folic acid, magnesium to ensure that is not a cause for PLMs       -  If daytime sleepiness persists after the treatment of DANIA, will consider treatment with Neurontin, Pregabalin, Requip or Mirapex        -  May benefit from Neurontin to help with chronic pain as well  3   Possible cataplexy and sleep paralysis symptoms - It is unclear if she truly has narcolepsy       -  Trial CPAP and treatment of RLS first   If this does not help sleepiness, I would recommend a CPAP with MSLT  4   COPD - managed by Dr Fadi Hernandez  I spent 30 minutes with the patient during this virtual check-in visit

## 2020-03-17 ENCOUNTER — DOCUMENTATION (OUTPATIENT)
Dept: PULMONOLOGY | Facility: CLINIC | Age: 50
End: 2020-03-17

## 2020-03-17 NOTE — PROGRESS NOTES
Script for CPAP and supplies has been faxed to [address]  on 3/17/20 to fax number [phone] and [phone]

## 2020-03-19 LAB
ALBUMIN SERPL-MCNC: 4.4 G/DL (ref 3.6–5.1)
ALBUMIN/GLOB SERPL: 2 (CALC) (ref 1–2.5)
ALP SERPL-CCNC: 77 U/L (ref 37–153)
ALT SERPL-CCNC: 19 U/L (ref 6–29)
AST SERPL-CCNC: 18 U/L (ref 10–35)
BASOPHILS # BLD AUTO: 53 CELLS/UL (ref 0–200)
BASOPHILS NFR BLD AUTO: 0.6 %
BILIRUB SERPL-MCNC: 0.3 MG/DL (ref 0.2–1.2)
BUN SERPL-MCNC: 14 MG/DL (ref 7–25)
BUN/CREAT SERPL: NORMAL (CALC) (ref 6–22)
CALCIUM SERPL-MCNC: 9.8 MG/DL (ref 8.6–10.4)
CHLORIDE SERPL-SCNC: 103 MMOL/L (ref 98–110)
CHOLEST SERPL-MCNC: 217 MG/DL
CHOLEST/HDLC SERPL: 4.2 (CALC)
CO2 SERPL-SCNC: 27 MMOL/L (ref 20–32)
CREAT SERPL-MCNC: 0.95 MG/DL (ref 0.5–1.05)
EOSINOPHIL # BLD AUTO: 160 CELLS/UL (ref 15–500)
EOSINOPHIL NFR BLD AUTO: 1.8 %
ERYTHROCYTE [DISTWIDTH] IN BLOOD BY AUTOMATED COUNT: 12.7 % (ref 11–15)
FERRITIN SERPL-MCNC: 62 NG/ML (ref 16–232)
GLOBULIN SER CALC-MCNC: 2.2 G/DL (CALC) (ref 1.9–3.7)
GLUCOSE SERPL-MCNC: 89 MG/DL (ref 65–99)
HCT VFR BLD AUTO: 43.8 % (ref 35–45)
HDLC SERPL-MCNC: 52 MG/DL
HGB BLD-MCNC: 15.1 G/DL (ref 11.7–15.5)
IRON SATN MFR SERPL: 28 % (CALC) (ref 16–45)
IRON SERPL-MCNC: 119 MCG/DL (ref 45–160)
LDLC SERPL CALC-MCNC: 118 MG/DL (CALC)
LYMPHOCYTES # BLD AUTO: 3925 CELLS/UL (ref 850–3900)
LYMPHOCYTES NFR BLD AUTO: 44.1 %
MAGNESIUM SERPL-MCNC: 2 MG/DL (ref 1.5–2.5)
MCH RBC QN AUTO: 31.5 PG (ref 27–33)
MCHC RBC AUTO-ENTMCNC: 34.5 G/DL (ref 32–36)
MCV RBC AUTO: 91.4 FL (ref 80–100)
MONOCYTES # BLD AUTO: 641 CELLS/UL (ref 200–950)
MONOCYTES NFR BLD AUTO: 7.2 %
NEUTROPHILS # BLD AUTO: 4121 CELLS/UL (ref 1500–7800)
NEUTROPHILS NFR BLD AUTO: 46.3 %
NONHDLC SERPL-MCNC: 165 MG/DL (CALC)
PLATELET # BLD AUTO: 333 THOUSAND/UL (ref 140–400)
PMV BLD REES-ECKER: 10 FL (ref 7.5–12.5)
POTASSIUM SERPL-SCNC: 4.3 MMOL/L (ref 3.5–5.3)
PROT SERPL-MCNC: 6.6 G/DL (ref 6.1–8.1)
RBC # BLD AUTO: 4.79 MILLION/UL (ref 3.8–5.1)
SL AMB EGFR AFRICAN AMERICAN: 81 ML/MIN/1.73M2
SL AMB EGFR NON AFRICAN AMERICAN: 70 ML/MIN/1.73M2
SODIUM SERPL-SCNC: 140 MMOL/L (ref 135–146)
TIBC SERPL-MCNC: 424 MCG/DL (CALC) (ref 250–450)
TRIGL SERPL-MCNC: 331 MG/DL
TSH SERPL-ACNC: 3.17 MIU/L
WBC # BLD AUTO: 8.9 THOUSAND/UL (ref 3.8–10.8)

## 2020-03-24 ENCOUNTER — TELEPHONE (OUTPATIENT)
Dept: PULMONOLOGY | Facility: CLINIC | Age: 50
End: 2020-03-24

## 2020-03-24 NOTE — TELEPHONE ENCOUNTER
Patient called in wanting to know the results of her iron and magnesium levels  They were done on 3/18/20 please advise

## 2020-03-24 NOTE — TELEPHONE ENCOUNTER
Please let her know that her magnesium was a little low and she should supplement it via oral and diet  Otherwise, iron was fine

## 2020-03-24 NOTE — TELEPHONE ENCOUNTER
I called patient and went over the results of her sleep study with her  Patient is aware that she has mild DANIA and the recommendation is to move forward with Auto CPAP machine  Patient is agreeable to this and script for auto-CPAP has been faxed to [address]  at [phone]  Patient will call me back in 2 weeks if she does not hear from [address]  She is scheduled for a follow up visit in May with Dr Zoey Locke and compliance will be addressed at that time

## 2020-04-21 ENCOUNTER — TELEPHONE (OUTPATIENT)
Dept: PULMONOLOGY | Facility: CLINIC | Age: 50
End: 2020-04-21

## 2020-04-21 ENCOUNTER — TELEMEDICINE (OUTPATIENT)
Dept: FAMILY MEDICINE CLINIC | Facility: HOSPITAL | Age: 50
End: 2020-04-21
Payer: COMMERCIAL

## 2020-04-21 VITALS — TEMPERATURE: 97.2 F | WEIGHT: 171 LBS | BODY MASS INDEX: 30.29 KG/M2

## 2020-04-21 DIAGNOSIS — G47.33 OSA (OBSTRUCTIVE SLEEP APNEA): Primary | ICD-10-CM

## 2020-04-21 DIAGNOSIS — J30.1 SEASONAL ALLERGIC RHINITIS DUE TO POLLEN: ICD-10-CM

## 2020-04-21 DIAGNOSIS — K58.0 IRRITABLE BOWEL SYNDROME WITH DIARRHEA: ICD-10-CM

## 2020-04-21 DIAGNOSIS — F33.1 MODERATE EPISODE OF RECURRENT MAJOR DEPRESSIVE DISORDER (HCC): ICD-10-CM

## 2020-04-21 DIAGNOSIS — G47.33 OSA (OBSTRUCTIVE SLEEP APNEA): ICD-10-CM

## 2020-04-21 DIAGNOSIS — Z72.0 TOBACCO ABUSE: ICD-10-CM

## 2020-04-21 DIAGNOSIS — J43.8 OTHER EMPHYSEMA (HCC): ICD-10-CM

## 2020-04-21 DIAGNOSIS — H00.011 HORDEOLUM EXTERNUM OF RIGHT UPPER EYELID: Primary | ICD-10-CM

## 2020-04-21 PROBLEM — R07.9 CHEST PAIN AT REST: Status: RESOLVED | Noted: 2017-10-26 | Resolved: 2020-04-21

## 2020-04-21 PROCEDURE — G2012 BRIEF CHECK IN BY MD/QHP: HCPCS | Performed by: INTERNAL MEDICINE

## 2020-04-21 RX ORDER — MONTELUKAST SODIUM 10 MG/1
10 TABLET ORAL
Qty: 30 TABLET | Refills: 5 | Status: SHIPPED | OUTPATIENT
Start: 2020-04-21 | End: 2020-05-20 | Stop reason: SDUPTHER

## 2020-04-21 RX ORDER — TOBRAMYCIN AND DEXAMETHASONE 3; 1 MG/ML; MG/ML
1 SUSPENSION/ DROPS OPHTHALMIC
Qty: 5 ML | Refills: 0 | Status: SHIPPED | OUTPATIENT
Start: 2020-04-21 | End: 2020-10-08 | Stop reason: ALTCHOICE

## 2020-05-20 DIAGNOSIS — J30.1 SEASONAL ALLERGIC RHINITIS DUE TO POLLEN: ICD-10-CM

## 2020-05-20 RX ORDER — MONTELUKAST SODIUM 10 MG/1
10 TABLET ORAL
Qty: 90 TABLET | Refills: 3 | Status: SHIPPED | OUTPATIENT
Start: 2020-05-20 | End: 2021-10-22 | Stop reason: SDUPTHER

## 2020-05-26 ENCOUNTER — TELEMEDICINE (OUTPATIENT)
Dept: PULMONOLOGY | Facility: CLINIC | Age: 50
End: 2020-05-26
Payer: COMMERCIAL

## 2020-05-26 VITALS — HEIGHT: 63 IN | BODY MASS INDEX: 29.95 KG/M2 | WEIGHT: 169 LBS

## 2020-05-26 DIAGNOSIS — G47.33 OSA (OBSTRUCTIVE SLEEP APNEA): Primary | ICD-10-CM

## 2020-05-26 DIAGNOSIS — G25.81 RLS (RESTLESS LEGS SYNDROME): ICD-10-CM

## 2020-05-26 PROCEDURE — 99213 OFFICE O/P EST LOW 20 MIN: CPT | Performed by: INTERNAL MEDICINE

## 2020-05-26 PROCEDURE — 3008F BODY MASS INDEX DOCD: CPT | Performed by: INTERNAL MEDICINE

## 2020-08-01 ENCOUNTER — APPOINTMENT (EMERGENCY)
Dept: RADIOLOGY | Facility: HOSPITAL | Age: 50
End: 2020-08-01
Payer: COMMERCIAL

## 2020-08-01 ENCOUNTER — HOSPITAL ENCOUNTER (EMERGENCY)
Facility: HOSPITAL | Age: 50
Discharge: HOME | End: 2020-08-01
Attending: EMERGENCY MEDICINE | Admitting: EMERGENCY MEDICINE
Payer: COMMERCIAL

## 2020-08-01 VITALS
TEMPERATURE: 97 F | DIASTOLIC BLOOD PRESSURE: 101 MMHG | HEIGHT: 63 IN | HEART RATE: 97 BPM | OXYGEN SATURATION: 99 % | RESPIRATION RATE: 16 BRPM | WEIGHT: 170 LBS | SYSTOLIC BLOOD PRESSURE: 159 MMHG | BODY MASS INDEX: 30.12 KG/M2

## 2020-08-01 DIAGNOSIS — S93.105A TOE DISLOCATION, LEFT, INITIAL ENCOUNTER: Primary | ICD-10-CM

## 2020-08-01 PROCEDURE — 99283 EMERGENCY DEPT VISIT LOW MDM: CPT

## 2020-08-01 PROCEDURE — 99284 EMERGENCY DEPT VISIT MOD MDM: CPT | Performed by: PHYSICIAN ASSISTANT

## 2020-08-01 PROCEDURE — 73630 X-RAY EXAM OF FOOT: CPT

## 2020-08-01 NOTE — ED PROVIDER NOTES
History  Chief Complaint   Patient presents with    Toe Injury     Pt caught her left 5th toe on an 80 pound bag of concrete and it was deformed, pt reduced toe back into place and now is having pain and bruising  No medications prior to arrival       27-year-old female history of hypertension presents emergency department for evaluation of left 5th toe injury  States she struck the toe on a large bag of concrete  Noted to toe was initially deformed however she was able to relocate the toe and now has worsening bruising and pain  No open wounds  No other foot pain  Prior to Admission Medications   Prescriptions Last Dose Informant Patient Reported? Taking?    B Complex-Folic Acid (SUPER B COMPLEX MAXI) TABS  Self Yes No   Sig: Take by mouth   Cholecalciferol (VITAMIN D3) 1000 units CAPS  Self Yes No   Sig: Take 2 capsules by mouth daily   Magnesium 250 MG TABS   Yes No   Sig: Take 30 mg by mouth daily   Multiple Vitamin (MULTI-VITAMIN DAILY PO)  Self Yes No   Sig: Take 1 tablet by mouth daily   albuterol (PROAIR HFA) 90 mcg/act inhaler  Self No No   Sig: Inhale 2 puffs every 6 (six) hours as needed for wheezing or shortness of breath   fluticasone-vilanterol (BREO ELLIPTA) 200-25 MCG/INH inhaler   No No   Sig: Inhale 1 puff daily Rinse mouth after use    montelukast (SINGULAIR) 10 mg tablet   No No   Sig: Take 1 tablet (10 mg total) by mouth daily at bedtime   sertraline (ZOLOFT) 100 mg tablet   No No   Sig: Take 1 5 tablets (150 mg total) by mouth daily   tobramycin-dexamethasone (TOBRADEX) ophthalmic suspension   No No   Sig: Administer 1 drop to the right eye every 4 (four) hours while awake   Patient not taking: Reported on 5/26/2020   varenicline (CHANTIX) 1 mg tablet   No No   Sig: Take 1 tablet (1 mg total) by mouth 2 (two) times a day      Facility-Administered Medications: None       Past Medical History:   Diagnosis Date    Asthma     [facility] 2/14/2018    Dysmenorrhea     Emphysema of lung ([address] )     Hypertension     IBS (irritable bowel syndrome)     Liver lesion 1/9/2018    MDD (major depressive disorder), recurrent episode, moderate ([address] ) 2/14/2018    Menopausal symptoms     Other emphysema ([address] ) 2/23/2018    PTSD (post-traumatic stress disorder) 2/14/2018    Skin disorder     Tick bite        Past Surgical History:   Procedure Laterality Date    CHOLECYSTECTOMY      KNEE SURGERY      MICRODISCECTOMY LUMBAR  2003    TONSILLECTOMY  1986       Family History   Problem Relation Age of Onset    Bipolar disorder Mother     Diverticulitis Mother     Clotting disorder Mother         coagulation disorder; after hernia surg and had multiorgan failure- at [facility] age 71    Mental illness Mother         suicidal ideation    OCD Sister     Bipolar disorder Brother         affective    Substance Abuse Brother     Suicide Attempts Brother     Aortic aneurysm Other     Aortic aneurysm Family         abdominal    Diabetes Family     COPD Maternal Grandfather      I have reviewed and agree with the history as documented  E-Cigarette/Vaping     E-Cigarette/Vaping Substances     Social History     Tobacco Use    Smoking status: Current Every Day Smoker     Packs/day: 0 50     Years: 30 00     Pack years: 15 00     Types: Cigarettes    Smokeless tobacco: Never Used   Substance Use Topics    Alcohol use: No    Drug use: No       Review of Systems   Constitutional: Negative for fever  Musculoskeletal: Positive for arthralgias  Negative for joint swelling and myalgias  Skin: Negative for color change and wound  Neurological: Negative for weakness and numbness  All other systems reviewed and are negative  Physical Exam  Physical Exam  Vitals signs reviewed  Constitutional:       General: She is not in acute distress  Appearance: She is well-developed  She is not diaphoretic  HENT:      Head: Normocephalic and atraumatic  Mouth/Throat:      Mouth: Mucous membranes are moist    Eyes:      General: No scleral icterus  Pupils: Pupils are equal, round, and reactive to light  Cardiovascular:      Rate and Rhythm: Normal rate and regular rhythm  Heart sounds: No murmur  No friction rub  No gallop  Pulmonary:      Effort: No respiratory distress  Breath sounds: No wheezing or rales  Musculoskeletal:      Comments: Significant bruising and swelling of the left 5th digit  No obvious deformities, no open wounds, no bony protrusion  Gait is antalgic   Skin:     General: Skin is dry  Capillary Refill: Capillary refill takes less than 2 seconds  Psychiatric:         Behavior: Behavior normal          Vital Signs  ED Triage Vitals [08/01/20 1937]   Temperature Pulse Respirations Blood Pressure SpO2   (!) 97 °F (36 1 °C) 97 16 (!) 159/101 99 %      Temp Source Heart Rate Source Patient Position - Orthostatic VS BP Location FiO2 (%)   Temporal Monitor Sitting Left arm --      Pain Score       6           Vitals:    08/01/20 1937   BP: (!) 159/101   Pulse: 97   Patient Position - Orthostatic VS: Sitting         Visual Acuity      ED Medications  Medications - No data to display    Diagnostic Studies  Results Reviewed     None                 XR foot 3+ views LEFT   ED Interpretation by Amara Barbour PA-C (08/01 2014)   No acute fracture      Final Result by Avery Cai MD (08/02 0831)      No acute osseous abnormality  Workstation performed: [id number]                    Procedures  Procedures         ED Course       US AUDIT      Most Recent Value   Initial Alcohol Screen: US AUDIT-C    1  How often do you have a drink containing alcohol?  0 Filed at: 08/01/2020 1938   2  How many drinks containing alcohol do you have on a typical day you are drinking? 0 Filed at: 08/01/2020 1938   3a  Male UNDER 65: How often do you have five or more drinks on one occasion? 0 Filed at: 08/01/2020 1938   3b   FEMALE Any Age, or MALE 65+: How often do you have 4 or more drinks on one occassion? 0 Filed at: 08/01/2020 1938   Audit-C Score  0 Filed at: 08/01/2020 1938                  BRANDON/DAST-10      Most Recent Value   How many times in the past year have you    Used an illegal drug or used a prescription medication for non-medical reasons? Never Filed at: 08/01/2020 1938                                [facility]  Number of Diagnoses or Management Options  Toe dislocation, left, initial encounter: new and requires workup  Diagnosis management comments: X-rays unremarkable  Patient clearly reapproximated her dislocated toe pre-hospital E, recommend buddy taping and flat soled footwear       Amount and/or Complexity of Data Reviewed  Tests in the radiology section of CPT®: ordered and reviewed  Review and summarize past medical records: yes  Independent visualization of images, tracings, or specimens: yes          Disposition  Final diagnoses: Toe dislocation, left, initial encounter     Time reflects when diagnosis was documented in both MDM as applicable and the Disposition within this note     Time User Action Codes Description Comment    8/1/2020  8:07 PM Twyla Pulse Add [S93 105A] Toe dislocation, left, initial encounter       ED Disposition     ED Disposition Condition Date/Time Comment    Discharge Stable Sat Aug 1, 2020  8:07 PM Bucky Lizama discharge to home/self care              Follow-up Information     Follow up With Specialties Details Why Tammy [address], [address] [facility]   [address] [address]  [phone]            Discharge Medication List as of 8/1/2020  8:13 PM      CONTINUE these medications which have NOT CHANGED    Details   albuterol (PROAIR HFA) 90 mcg/act inhaler Inhale 2 puffs every 6 (six) hours as needed for wheezing or shortness of breath, Starting Fri 2/23/2018, Normal      B Complex-Folic Acid (SUPER B COMPLEX MAXI) TABS Take by mouth, Historical Med Cholecalciferol (VITAMIN D3) 1000 units CAPS Take 2 capsules by mouth daily, Starting Fri 12/4/2015, Historical Med      fluticasone-vilanterol (BREO ELLIPTA) 200-25 MCG/INH inhaler Inhale 1 puff daily Rinse mouth after use , Starting Wed 3/11/2020, Normal      Magnesium 250 MG TABS Take 30 mg by mouth daily, Historical Med      montelukast (SINGULAIR) 10 mg tablet Take 1 tablet (10 mg total) by mouth daily at bedtime, Starting Wed 5/20/2020, Normal      Multiple Vitamin (MULTI-VITAMIN DAILY PO) Take 1 tablet by mouth daily, Starting Fri 12/4/2015, Historical Med      sertraline (ZOLOFT) 100 mg tablet Take 1 5 tablets (150 mg total) by mouth daily, Starting Fri 3/6/2020, Normal      tobramycin-dexamethasone (TOBRADEX) ophthalmic suspension Administer 1 drop to the right eye every 4 (four) hours while awake, Starting Tue 4/21/2020, Normal      varenicline (CHANTIX) 1 mg tablet Take 1 tablet (1 mg total) by mouth 2 (two) times a day, Starting Fri 3/6/2020, Normal           No discharge procedures on file      PDMP Review     None          ED Provider  Electronically Signed by           Michelle Almaraz PA-C  08/02/20 2623

## 2020-08-04 ENCOUNTER — VBI (OUTPATIENT)
Dept: FAMILY MEDICINE CLINIC | Facility: HOSPITAL | Age: 50
End: 2020-08-04

## 2020-08-04 NOTE — TELEPHONE ENCOUNTER
Reji García    ED Visit Information     Ed visit date: 8/1/20  Diagnosis Description: Toe dislocation, left, initial encounter  In Network? Yes [place]  Discharge status: Home  Discharged with meds ? No  Number of ED visits to date: 1  ED Severity:NA     Outreach Information    Outreach successful: No 2  Date letter mailed:NA  Date Finalized:8/5/20    Care Coordination    Follow up appointment with pcp: no (advised to follow up with PCP)  Transportation issues ? No    Value [employer] type:  7 Day Outreach  Emergent necessity warranted by diagnosis:  No  [facility] PCP:  Yes  Transportation:  Self Transport  Called PCP first?:  No  08/04/2020 01:58 PM Phone ([employer]) [place] (Self) [phone] (H)   Left Message    Unable to speak with patient regarding ED visit on 8/1/20 for Toe dislocation, left, initial encounter  A second outreach attempt will be made  08/05/2020 08:07 AM Phone ([employer]) Gonzalez [place] (Self) [phone] (H)   Left Message    Unable to speak with patient regarding ED visit on 8/1/20 for Toe dislocation, left, initial encounter  No further outreach will be made, unable to mail [facility] education letter

## 2020-08-21 ENCOUNTER — HOSPITAL ENCOUNTER (OUTPATIENT)
Dept: MAMMOGRAPHY | Facility: CLINIC | Age: 50
Discharge: HOME | End: 2020-08-21
Payer: COMMERCIAL

## 2020-08-21 ENCOUNTER — HOSPITAL ENCOUNTER (OUTPATIENT)
Dept: ULTRASOUND IMAGING | Facility: CLINIC | Age: 50
Discharge: HOME | End: 2020-08-21
Payer: COMMERCIAL

## 2020-08-21 VITALS — BODY MASS INDEX: 30.12 KG/M2 | TEMPERATURE: 96.5 F | WEIGHT: 170 LBS | HEIGHT: 63 IN

## 2020-08-21 VITALS — TEMPERATURE: 96.5 F

## 2020-08-21 DIAGNOSIS — Z87.898 HISTORY OF ABNORMAL MAMMOGRAM: ICD-10-CM

## 2020-08-21 DIAGNOSIS — R92.8 ABNORMAL MAMMOGRAM: ICD-10-CM

## 2020-08-21 PROCEDURE — G0279 TOMOSYNTHESIS, MAMMO: HCPCS

## 2020-08-21 PROCEDURE — 77066 DX MAMMO INCL CAD BI: CPT

## 2020-08-21 PROCEDURE — 76642 ULTRASOUND BREAST LIMITED: CPT

## 2020-09-15 DIAGNOSIS — Z72.0 TOBACCO ABUSE: ICD-10-CM

## 2020-09-15 RX ORDER — VARENICLINE TARTRATE 1 MG/1
1 TABLET, FILM COATED ORAL 2 TIMES DAILY
Qty: 60 TABLET | Refills: 2 | Status: SHIPPED | OUTPATIENT
Start: 2020-09-15 | End: 2021-04-16 | Stop reason: SDUPTHER

## 2020-10-08 ENCOUNTER — OFFICE VISIT (OUTPATIENT)
Dept: FAMILY MEDICINE CLINIC | Facility: HOSPITAL | Age: 50
End: 2020-10-08
Payer: COMMERCIAL

## 2020-10-08 VITALS
OXYGEN SATURATION: 97 % | DIASTOLIC BLOOD PRESSURE: 70 MMHG | SYSTOLIC BLOOD PRESSURE: 122 MMHG | BODY MASS INDEX: 31.68 KG/M2 | HEIGHT: 63 IN | HEART RATE: 71 BPM | WEIGHT: 178.8 LBS | TEMPERATURE: 97.9 F

## 2020-10-08 DIAGNOSIS — J43.8 OTHER EMPHYSEMA (HCC): ICD-10-CM

## 2020-10-08 DIAGNOSIS — G47.33 OSA (OBSTRUCTIVE SLEEP APNEA): ICD-10-CM

## 2020-10-08 DIAGNOSIS — Z12.4 SCREENING FOR CERVICAL CANCER: Primary | ICD-10-CM

## 2020-10-08 DIAGNOSIS — Z23 NEED FOR SHINGLES VACCINE: ICD-10-CM

## 2020-10-08 DIAGNOSIS — F33.1 MODERATE EPISODE OF RECURRENT MAJOR DEPRESSIVE DISORDER (HCC): ICD-10-CM

## 2020-10-08 DIAGNOSIS — F43.10 PTSD (POST-TRAUMATIC STRESS DISORDER): ICD-10-CM

## 2020-10-08 DIAGNOSIS — Z00.00 ANNUAL PHYSICAL EXAM: ICD-10-CM

## 2020-10-08 DIAGNOSIS — Z72.0 TOBACCO ABUSE: ICD-10-CM

## 2020-10-08 DIAGNOSIS — Z12.4 ENCOUNTER FOR SCREENING FOR CERVICAL CANCER: Primary | ICD-10-CM

## 2020-10-08 DIAGNOSIS — E78.2 MIXED HYPERLIPIDEMIA: ICD-10-CM

## 2020-10-08 PROBLEM — G47.9 FATIGUE DUE TO SLEEP PATTERN DISTURBANCE: Status: RESOLVED | Noted: 2020-02-04 | Resolved: 2020-10-08

## 2020-10-08 PROBLEM — F33.9 EPISODE OF RECURRENT MAJOR DEPRESSIVE DISORDER (HCC): Status: RESOLVED | Noted: 2018-02-14 | Resolved: 2020-10-08

## 2020-10-08 PROBLEM — R53.83 FATIGUE DUE TO SLEEP PATTERN DISTURBANCE: Status: RESOLVED | Noted: 2020-02-04 | Resolved: 2020-10-08

## 2020-10-08 PROCEDURE — 99396 PREV VISIT EST AGE 40-64: CPT | Performed by: INTERNAL MEDICINE

## 2020-10-08 RX ORDER — ZOSTER VACCINE RECOMBINANT, ADJUVANTED 50 MCG/0.5
2 KIT INTRAMUSCULAR SEE ADMIN INSTRUCTIONS
Qty: 2 EACH | Refills: 0 | Status: SHIPPED | OUTPATIENT
Start: 2020-10-08 | End: 2022-01-18

## 2020-10-12 ENCOUNTER — TELEMEDICINE (OUTPATIENT)
Dept: GASTROENTEROLOGY | Facility: CLINIC | Age: 50
End: 2020-10-12

## 2020-10-12 VITALS — BODY MASS INDEX: 31.54 KG/M2 | WEIGHT: 178 LBS | HEIGHT: 63 IN

## 2020-10-12 DIAGNOSIS — Z80.0 FAMILY HISTORY OF COLON CANCER: Primary | ICD-10-CM

## 2020-10-12 LAB
CLINICAL INFO: NORMAL
CYTO CVX: NORMAL
DATE PREVIOUS BX: NORMAL
LMP START DATE: NORMAL
SL AMB PREV. PAP:: NORMAL
SPECIMEN SOURCE CVX/VAG CYTO: NORMAL

## 2020-10-12 RX ORDER — SODIUM, POTASSIUM,MAG SULFATES 17.5-3.13G
SOLUTION, RECONSTITUTED, ORAL ORAL
Qty: 2 BOTTLE | Refills: 0 | Status: SHIPPED | OUTPATIENT
Start: 2020-10-12 | End: 2020-10-16 | Stop reason: HOSPADM

## 2020-10-16 ENCOUNTER — HOSPITAL ENCOUNTER (OUTPATIENT)
Dept: GASTROENTEROLOGY | Facility: AMBULATORY SURGERY CENTER | Age: 50
Discharge: HOME | End: 2020-10-16
Payer: COMMERCIAL

## 2020-10-16 ENCOUNTER — ANESTHESIA (OUTPATIENT)
Dept: GASTROENTEROLOGY | Facility: AMBULATORY SURGERY CENTER | Age: 50
End: 2020-10-16

## 2020-10-16 ENCOUNTER — ANESTHESIA EVENT (OUTPATIENT)
Dept: GASTROENTEROLOGY | Facility: AMBULATORY SURGERY CENTER | Age: 50
End: 2020-10-16

## 2020-10-16 VITALS
OXYGEN SATURATION: 98 % | DIASTOLIC BLOOD PRESSURE: 59 MMHG | SYSTOLIC BLOOD PRESSURE: 112 MMHG | HEART RATE: 81 BPM | RESPIRATION RATE: 24 BRPM | TEMPERATURE: 96.9 F

## 2020-10-16 VITALS — HEART RATE: 102 BPM

## 2020-10-16 DIAGNOSIS — Z86.010 HISTORY OF COLON POLYPS: ICD-10-CM

## 2020-10-16 PROCEDURE — 88305 TISSUE EXAM BY PATHOLOGIST: CPT | Performed by: PATHOLOGY

## 2020-10-16 PROCEDURE — 45380 COLONOSCOPY AND BIOPSY: CPT | Performed by: INTERNAL MEDICINE

## 2020-10-16 RX ORDER — PROPOFOL 10 MG/ML
INJECTION, EMULSION INTRAVENOUS AS NEEDED
Status: DISCONTINUED | OUTPATIENT
Start: 2020-10-16 | End: 2020-10-16

## 2020-10-16 RX ORDER — SODIUM CHLORIDE 9 MG/ML
50 INJECTION, SOLUTION INTRAVENOUS CONTINUOUS
Status: DISCONTINUED | OUTPATIENT
Start: 2020-10-16 | End: 2020-10-20 | Stop reason: HOSPADM

## 2020-10-16 RX ADMIN — PROPOFOL 50 MG: 10 INJECTION, EMULSION INTRAVENOUS at 11:46

## 2020-10-16 RX ADMIN — PROPOFOL 50 MG: 10 INJECTION, EMULSION INTRAVENOUS at 11:31

## 2020-10-16 RX ADMIN — PROPOFOL 50 MG: 10 INJECTION, EMULSION INTRAVENOUS at 11:42

## 2020-10-16 RX ADMIN — PROPOFOL 200 MG: 10 INJECTION, EMULSION INTRAVENOUS at 11:29

## 2020-10-16 RX ADMIN — PROPOFOL 100 MG: 10 INJECTION, EMULSION INTRAVENOUS at 11:37

## 2020-10-16 RX ADMIN — SODIUM CHLORIDE 50 ML/HR: 9 INJECTION, SOLUTION INTRAVENOUS at 10:28

## 2020-10-16 RX ADMIN — PROPOFOL 50 MG: 10 INJECTION, EMULSION INTRAVENOUS at 11:36

## 2020-10-16 RX ADMIN — PROPOFOL 200 MG: 10 INJECTION, EMULSION INTRAVENOUS at 11:24

## 2020-10-16 RX ADMIN — SODIUM CHLORIDE: 9 INJECTION, SOLUTION INTRAVENOUS at 11:10

## 2021-01-12 ENCOUNTER — TELEPHONE (OUTPATIENT)
Dept: PULMONOLOGY | Facility: CLINIC | Age: 51
End: 2021-01-12

## 2021-01-12 DIAGNOSIS — G47.33 OSA (OBSTRUCTIVE SLEEP APNEA): Primary | ICD-10-CM

## 2021-01-12 NOTE — TELEPHONE ENCOUNTER
Patient calling states she contacted [employer] [facility] on 12/18/20  order # [id number]  to reorder CPAP  Supplies  [facility] stated they sent paperwork for the doctor to sign so they can sent out the order from  12/20   Please advise

## 2021-01-12 NOTE — TELEPHONE ENCOUNTER
I called [employer] to make sure this order is good for her December order  They said yes it will cover December

## 2021-01-14 NOTE — TELEPHONE ENCOUNTER
Patient calling stating she spoke to [employer] this morning they are sending two orders and that the original order the office sent on 1/12   It will not be covering for her December order because the provider placed the order until 1/12  [employer] will be sending patient return labels to return the order since it will not be covered from the December order  Please advise  Patient is wanting for an update from our office   [id number]

## 2021-01-15 NOTE — TELEPHONE ENCOUNTER
I talked to patient today  She was able to talk to a manager from [facility]  We were told the wrong info from the company  The order will not cover the December supplies  Patient very frustrated with [employer]  She called her insurance co to see who else she can use for a DME company   Patient will be switching co

## 2021-01-22 ENCOUNTER — TELEPHONE (OUTPATIENT)
Dept: FAMILY MEDICINE CLINIC | Facility: HOSPITAL | Age: 51
End: 2021-01-22

## 2021-01-22 NOTE — TELEPHONE ENCOUNTER
Patient is 45 and has COPD  She has had the pneumovax 23 on 1/23/20  HM is not flagging her for Prevnar 13  Does she need this now or when she turns 71? She is scheduled to get it on Sunday at the pharmacy so she wants to be sure she actually needs it now

## 2021-01-22 NOTE — TELEPHONE ENCOUNTER
Yes is okay to receive that Prevnar 13- She should call in check with her insurance if they will cover for her

## 2021-02-08 DIAGNOSIS — F41.0 PANIC ATTACKS: ICD-10-CM

## 2021-02-08 DIAGNOSIS — F43.10 PTSD (POST-TRAUMATIC STRESS DISORDER): ICD-10-CM

## 2021-02-08 DIAGNOSIS — F33.1 MDD (MAJOR DEPRESSIVE DISORDER), RECURRENT EPISODE, MODERATE (HCC): ICD-10-CM

## 2021-02-08 DIAGNOSIS — F41.9 ANXIETY: ICD-10-CM

## 2021-02-09 RX ORDER — SERTRALINE HYDROCHLORIDE 100 MG/1
150 TABLET, FILM COATED ORAL DAILY
Qty: 135 TABLET | Refills: 3 | Status: SHIPPED | OUTPATIENT
Start: 2021-02-09 | End: 2021-04-16 | Stop reason: SDUPTHER

## 2021-02-11 DIAGNOSIS — J43.8 OTHER EMPHYSEMA (HCC): ICD-10-CM

## 2021-03-10 DIAGNOSIS — Z23 ENCOUNTER FOR IMMUNIZATION: ICD-10-CM

## 2021-04-16 ENCOUNTER — OFFICE VISIT (OUTPATIENT)
Dept: FAMILY MEDICINE CLINIC | Facility: HOSPITAL | Age: 51
End: 2021-04-16
Payer: COMMERCIAL

## 2021-04-16 VITALS
WEIGHT: 177 LBS | RESPIRATION RATE: 16 BRPM | BODY MASS INDEX: 31.36 KG/M2 | HEART RATE: 82 BPM | SYSTOLIC BLOOD PRESSURE: 150 MMHG | DIASTOLIC BLOOD PRESSURE: 78 MMHG | HEIGHT: 63 IN

## 2021-04-16 DIAGNOSIS — F41.0 PANIC ATTACKS: ICD-10-CM

## 2021-04-16 DIAGNOSIS — Z72.0 TOBACCO ABUSE: ICD-10-CM

## 2021-04-16 DIAGNOSIS — J43.8 OTHER EMPHYSEMA (HCC): ICD-10-CM

## 2021-04-16 DIAGNOSIS — E78.2 MIXED HYPERLIPIDEMIA: Primary | ICD-10-CM

## 2021-04-16 DIAGNOSIS — F41.9 ANXIETY: ICD-10-CM

## 2021-04-16 DIAGNOSIS — F43.10 PTSD (POST-TRAUMATIC STRESS DISORDER): ICD-10-CM

## 2021-04-16 DIAGNOSIS — F33.1 MDD (MAJOR DEPRESSIVE DISORDER), RECURRENT EPISODE, MODERATE (HCC): ICD-10-CM

## 2021-04-16 DIAGNOSIS — E66.09 CLASS 1 OBESITY DUE TO EXCESS CALORIES WITHOUT SERIOUS COMORBIDITY WITH BODY MASS INDEX (BMI) OF 31.0 TO 31.9 IN ADULT: ICD-10-CM

## 2021-04-16 DIAGNOSIS — G47.33 OSA (OBSTRUCTIVE SLEEP APNEA): ICD-10-CM

## 2021-04-16 PROBLEM — E66.811 CLASS 1 OBESITY DUE TO EXCESS CALORIES WITHOUT SERIOUS COMORBIDITY IN ADULT: Status: ACTIVE | Noted: 2021-04-16

## 2021-04-16 PROCEDURE — 3008F BODY MASS INDEX DOCD: CPT | Performed by: INTERNAL MEDICINE

## 2021-04-16 PROCEDURE — 99214 OFFICE O/P EST MOD 30 MIN: CPT | Performed by: INTERNAL MEDICINE

## 2021-04-16 PROCEDURE — 3725F SCREEN DEPRESSION PERFORMED: CPT | Performed by: INTERNAL MEDICINE

## 2021-04-16 RX ORDER — CITALOPRAM 10 MG/1
20 TABLET ORAL DAILY
Qty: 60 TABLET | Refills: 1 | Status: SHIPPED | OUTPATIENT
Start: 2021-04-16 | End: 2021-05-10 | Stop reason: SDUPTHER

## 2021-04-16 RX ORDER — SERTRALINE HYDROCHLORIDE 100 MG/1
100 TABLET, FILM COATED ORAL DAILY
Qty: 90 TABLET | Refills: 1 | Status: SHIPPED | OUTPATIENT
Start: 2021-04-16 | End: 2021-05-17 | Stop reason: ALTCHOICE

## 2021-04-16 RX ORDER — VARENICLINE TARTRATE 1 MG/1
1 TABLET, FILM COATED ORAL 2 TIMES DAILY
Qty: 60 TABLET | Refills: 2 | Status: SHIPPED | OUTPATIENT
Start: 2021-04-16 | End: 2022-01-18

## 2021-04-16 NOTE — PROGRESS NOTES
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         Relevant Medications    varenicline (CHANTIX) 1 mg tablet    Panic attacks     Had severe episode last week- had chest tightness and felt like she was going to vomit   some depression sympotoms with this- crying with life stressors         Mixed hyperlipidemia - Primary    Class 1 obesity due to excess calories without serious comorbidity in adult     Lives by self- with high risk has been in house most of the time  Subjective:      Patient ID: Prem Dunbar is a 45 y o  female    1  Worsening depression and panic attacks- has been on sertraline long term - did not tolerate wellbutrin yearsa go- had tried effexor in menopause- discussed   2  Axillary lymphadenopathy-- had second covid shot several weeks ago- had some soreness under arms in past few weeks- will monitor this       The following portions of the patient's history were reviewed and updated as appropriate: allergies, current medications and problem list      Review of Systems   Constitutional: Positive for fatigue  HENT: Negative for congestion  Respiratory: Negative for shortness of breath  Cardiovascular: Positive for palpitations  Negative for chest pain  Gastrointestinal: Negative for abdominal distention  Psychiatric/Behavioral:        Tearful ness   All other systems reviewed and are negative          Objective:      Current Outpatient Medications:     albuterol (PROAIR HFA) 90 mcg/act inhaler, Inhale 2 puffs every 6 (six) hours as needed for wheezing or shortness of breath, Disp: 1 Inhaler, Rfl: 6    B Complex-Folic Acid (SUPER B COMPLEX MAXI) TABS, Take by mouth, Disp: , Rfl:     Breo Ellipta 200-25 MCG/INH inhaler, USE 1 INHALATION DAILY (RINSE MOUTH AFTER USE), Disp: 180 each, Rfl: 0    Cholecalciferol (VITAMIN D3) 1000 units CAPS, Take 2 capsules by mouth daily, Disp: , Rfl:     Magnesium 250 MG TABS, Take 30 mg by mouth daily, Disp: , Rfl:     montelukast (SINGULAIR) 10 mg tablet, Take 1 tablet (10 mg total) by mouth daily at bedtime, Disp: 90 tablet, Rfl: 3    Multiple Vitamin (MULTI-VITAMIN DAILY PO), Take 1 tablet by mouth daily, Disp: , Rfl:     sertraline (ZOLOFT) 100 mg tablet, Take 1 5 tablets (150 mg total) by mouth daily, Disp: 135 tablet, Rfl: 3    varenicline (CHANTIX) 1 mg tablet, Take 1 tablet (1 mg total) by mouth 2 (two) times a day, Disp: 60 tablet, Rfl: 2    Zoster Vac Recomb Adjuvanted (Shingrix) 50 MCG/0 5ML SUSR, Inject 2 Doses into a muscle see administration instructions 2 doses 2- 6 months apart, Disp: 2 each, Rfl: 0    Blood pressure 150/78, pulse 82, resp  rate 16, height 5' 3" (1 6 m), weight 80 3 kg (177 lb)  Physical Exam  Vitals signs and nursing note reviewed  Constitutional:       General: She is not in acute distress  Appearance: Normal appearance  HENT:      Right Ear: There is no impacted cerumen  Mouth/Throat:      Pharynx: No oropharyngeal exudate or posterior oropharyngeal erythema  Eyes:      General:         Right eye: No discharge  Left eye: No discharge  Cardiovascular:      Rate and Rhythm: Normal rate and regular rhythm  Heart sounds: No murmur  Pulmonary:      Effort: No respiratory distress  Breath sounds: No wheezing  Abdominal:      General: Abdomen is flat  Palpations: Abdomen is soft  Musculoskeletal:         General: No tenderness  Skin:     General: Skin is warm        Comments: Small lymph needs under axillary region Neurological:      Mental Status: She is alert     Psychiatric:         Mood and Affect: Mood normal

## 2021-04-16 NOTE — PATIENT INSTRUCTIONS
Cut sertraline from 150-  To 100 mg daily for 2 weeks then 50 mg daily for 2 weeks then stop  citolopram 10 mg  Daily for 2 weeks then increase to 20 mg daily

## 2021-04-16 NOTE — ASSESSMENT & PLAN NOTE
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

## 2021-04-16 NOTE — ASSESSMENT & PLAN NOTE
Had severe episode last week- had chest tightness and felt like she was going to vomit   some depression sympotoms with this- crying with life stressors

## 2021-05-10 DIAGNOSIS — F43.10 PTSD (POST-TRAUMATIC STRESS DISORDER): ICD-10-CM

## 2021-05-10 DIAGNOSIS — F41.0 PANIC ATTACKS: ICD-10-CM

## 2021-05-11 RX ORDER — CITALOPRAM 10 MG/1
20 TABLET ORAL DAILY
Qty: 60 TABLET | Refills: 1 | Status: SHIPPED | OUTPATIENT
Start: 2021-05-11 | End: 2021-05-13 | Stop reason: SDUPTHER

## 2021-05-13 DIAGNOSIS — F43.10 PTSD (POST-TRAUMATIC STRESS DISORDER): ICD-10-CM

## 2021-05-13 DIAGNOSIS — F41.0 PANIC ATTACKS: ICD-10-CM

## 2021-05-13 RX ORDER — CITALOPRAM 20 MG/1
20 TABLET ORAL DAILY
Qty: 90 TABLET | Refills: 3 | Status: SHIPPED | OUTPATIENT
Start: 2021-05-13 | End: 2021-07-08

## 2021-07-08 ENCOUNTER — TELEPHONE (OUTPATIENT)
Dept: FAMILY MEDICINE CLINIC | Facility: HOSPITAL | Age: 51
End: 2021-07-08

## 2021-07-08 ENCOUNTER — OFFICE VISIT (OUTPATIENT)
Dept: FAMILY MEDICINE CLINIC | Facility: HOSPITAL | Age: 51
End: 2021-07-08
Payer: COMMERCIAL

## 2021-07-08 VITALS
HEIGHT: 63 IN | HEART RATE: 93 BPM | WEIGHT: 175 LBS | DIASTOLIC BLOOD PRESSURE: 72 MMHG | SYSTOLIC BLOOD PRESSURE: 126 MMHG | BODY MASS INDEX: 31.01 KG/M2

## 2021-07-08 DIAGNOSIS — F43.10 PTSD (POST-TRAUMATIC STRESS DISORDER): Primary | ICD-10-CM

## 2021-07-08 DIAGNOSIS — F41.0 PANIC ATTACKS: ICD-10-CM

## 2021-07-08 PROCEDURE — 3008F BODY MASS INDEX DOCD: CPT | Performed by: PHYSICIAN ASSISTANT

## 2021-07-08 PROCEDURE — 99213 OFFICE O/P EST LOW 20 MIN: CPT | Performed by: PHYSICIAN ASSISTANT

## 2021-07-08 RX ORDER — CITALOPRAM 20 MG/1
30 TABLET ORAL DAILY
Qty: 140 TABLET | Refills: 3 | Status: SHIPPED | OUTPATIENT
Start: 2021-07-08 | End: 2021-07-21 | Stop reason: SDUPTHER

## 2021-07-08 RX ORDER — CLONIDINE HYDROCHLORIDE 0.1 MG/1
0.1 TABLET ORAL EVERY 12 HOURS SCHEDULED
Qty: 30 TABLET | Refills: 3 | Status: SHIPPED | OUTPATIENT
Start: 2021-07-08 | End: 2021-07-09 | Stop reason: SDUPTHER

## 2021-07-08 NOTE — PATIENT INSTRUCTIONS
Weight Management   AMBULATORY CARE:   Why it is important to manage your weight:  Being overweight increases your risk of health conditions such as heart disease, high blood pressure, type 2 diabetes, and certain types of cancer  It can also increase your risk for osteoarthritis, sleep apnea, and other respiratory problems  Aim for a slow, steady weight loss  Even a small amount of weight loss can lower your risk of health problems  How to lose weight safely:  A safe and healthy way to lose weight is to eat fewer calories and get regular exercise  · You can lose up about 1 pound a week by decreasing the number of calories you eat by 500 calories each day  You can decrease calories by eating smaller portion sizes or by cutting out high-calorie foods  Read labels to find out how many calories are in the foods you eat  · You can also burn calories with exercise such as walking, swimming, or biking  You will be more likely to keep weight off if you make these changes part of your lifestyle  Exercise at least 30 minutes per day on most days of the week  You can also fit in more physical activity by taking the stairs instead of the elevator or parking farther away from stores  Ask your healthcare provider about the best exercise plan for you  Healthy meal plan for weight management:  A healthy meal plan includes a variety of foods, contains fewer calories, and helps you stay healthy  A healthy meal plan includes the following:     · Eat whole-grain foods more often  A healthy meal plan should contain fiber  Fiber is the part of grains, fruits, and vegetables that is not broken down by your body  Whole-grain foods are healthy and provide extra fiber in your diet  Some examples of whole-grain foods are whole-wheat breads and pastas, oatmeal, brown rice, and bulgur  · Eat a variety of vegetables every day  Include dark, leafy greens such as spinach, kale, estephania greens, and mustard greens   Eat yellow and orange vegetables such as carrots, sweet potatoes, and winter squash  · Eat a variety of fruits every day  Choose fresh or canned fruit (canned in its own juice or light syrup) instead of juice  Fruit juice has very little or no fiber  · Eat low-fat dairy foods  Drink fat-free (skim) milk or 1% milk  Eat fat-free yogurt and low-fat cottage cheese  Try low-fat cheeses such as mozzarella and other reduced-fat cheeses  · Choose meat and other protein foods that are low in fat  Choose beans or other legumes such as split peas or lentils  Choose fish, skinless poultry (chicken or turkey), or lean cuts of red meat (beef or pork)  Before you cook meat or poultry, cut off any visible fat  · Use less fat and oil  Try baking foods instead of frying them  Add less fat, such as margarine, sour cream, regular salad dressing and mayonnaise to foods  Eat fewer high-fat foods  Some examples of high-fat foods include french fries, doughnuts, ice cream, and cakes  · Eat fewer sweets  Limit foods and drinks that are high in sugar  This includes candy, cookies, regular soda, and sweetened drinks  Ways to decrease calories:   · Eat smaller portions  ? Use a small plate with smaller servings  ? Do not eat second helpings  ? When you eat at a restaurant, ask for a box and place half of your meal in the box before you eat  ? Share an entrée with someone else  · Replace high-calorie snacks with healthy, low-calorie snacks  ? Choose fresh fruit, vegetables, fat-free rice cakes, or air-popped popcorn instead of potato chips, nuts, or chocolate  ? Choose water or calorie-free drinks instead of soda or sweetened drinks  · Do not shop for groceries when you are hungry  You may be more likely to make unhealthy food choices  Take a grocery list of healthy foods and shop after you have eaten  · Eat regular meals  Do not skip meals  Skipping meals can lead to overeating later in the day   This can make it harder for you to lose weight  Eat a healthy snack in place of a meal if you do not have time to eat a regular meal  Talk with a dietitian to help you create a meal plan and schedule that is right for you  Other things to consider as you try to lose weight:   · Be aware of situations that may give you the urge to overeat, such as eating while watching television  Find ways to avoid these situations  For example, read a book, go for a walk, or do crafts  · Meet with a weight loss support group or friends who are also trying to lose weight  This may help you stay motivated to continue working on your weight loss goals  © Copyright [address] Information is for End User's use only and may not be sold, redistributed or otherwise used for commercial purposes  All illustrations and images included in CareNotes® are the copyrighted property of [employer]  or [facility] Nimesh Marquis   The above information is an  only  It is not intended as medical advice for individual conditions or treatments  Talk to your doctor, nurse or pharmacist before following any medical regimen to see if it is safe and effective for you

## 2021-07-08 NOTE — PROGRESS NOTES
Assessment/Plan:       Problem List Items Addressed This Visit     PTSD-   Increase celexa to 30 mg  And add clonidine 0 1   At bedtime, continue with counseling/therapy  Subjective:      Patient ID: Fannie Ndiaye is a 45 y o  female  Presents to discuss medications  Sister using medical marijuana, and she is getting worse  Wants to change citalopram, use to take zoloft, and was switched to citalopram    Has increased fatigue and low motivation, eating more, jittery and abd  Pain, also twitches, and increased aggressive behavior/language  Was laid off from work:    , and looking for work  Has hx  Of PTSD---- upbringing with mom and brother,  sleeping more  Seeing therapist    Brother  at age 40, had heart failure, due to alcoholism, and mental health issues  Review of Systems   Constitutional: Positive for fatigue  Negative for chills, diaphoresis and fever  Respiratory: Negative for cough, chest tightness and shortness of breath  Has some allergy sx  And still smoking  Neurological: Positive for tremors  Psychiatric/Behavioral: Positive for agitation, behavioral problems, decreased concentration, dysphoric mood and sleep disturbance  Negative for self-injury and suicidal ideas  The patient is nervous/anxious  Objective:      /72   Pulse 93   Ht 5' 3" (1 6 m)   Wt 79 4 kg (175 lb)   LMP  (LMP Unknown)   BMI 31 00 kg/m²          Physical Exam  Vitals and nursing note reviewed  Constitutional:       General: She is not in acute distress  Appearance: Normal appearance  She is not ill-appearing, toxic-appearing or diaphoretic  Cardiovascular:      Rate and Rhythm: Normal rate and regular rhythm  Pulses: Normal pulses  Heart sounds: Normal heart sounds  Pulmonary:      Effort: Pulmonary effort is normal  No respiratory distress  Breath sounds: Normal breath sounds  No stridor  No wheezing or rhonchi  Musculoskeletal:         General: No swelling, tenderness, deformity or signs of injury  Normal range of motion  Neurological:      General: No focal deficit present  Mental Status: She is alert and oriented to person, place, and time  Psychiatric:         Mood and Affect: Mood normal          Behavior: Behavior normal          Thought Content: Thought content normal          Judgment: Judgment normal          BMI Counseling: Body mass index is 31 kg/m²  The BMI is above normal  Nutrition recommendations include 3-5 servings of fruits/vegetables daily

## 2021-07-15 DIAGNOSIS — F41.0 PANIC ATTACKS: ICD-10-CM

## 2021-07-15 DIAGNOSIS — F43.10 PTSD (POST-TRAUMATIC STRESS DISORDER): ICD-10-CM

## 2021-07-15 RX ORDER — CLONIDINE HYDROCHLORIDE 0.1 MG/1
TABLET ORAL
Qty: 30 TABLET | Refills: 3 | Status: SHIPPED | OUTPATIENT
Start: 2021-07-15 | End: 2021-10-27 | Stop reason: SINTOL

## 2021-07-20 DIAGNOSIS — F43.10 PTSD (POST-TRAUMATIC STRESS DISORDER): ICD-10-CM

## 2021-07-20 DIAGNOSIS — F41.0 PANIC ATTACKS: ICD-10-CM

## 2021-07-20 RX ORDER — CITALOPRAM 20 MG/1
30 TABLET ORAL DAILY
Qty: 20 TABLET | Refills: 0 | OUTPATIENT
Start: 2021-07-20

## 2021-07-21 DIAGNOSIS — F43.10 PTSD (POST-TRAUMATIC STRESS DISORDER): ICD-10-CM

## 2021-07-21 DIAGNOSIS — F41.0 PANIC ATTACKS: ICD-10-CM

## 2021-07-21 RX ORDER — CITALOPRAM 20 MG/1
30 TABLET ORAL DAILY
Qty: 20 TABLET | Refills: 0 | Status: SHIPPED | OUTPATIENT
Start: 2021-07-21 | End: 2021-09-20 | Stop reason: SDUPTHER

## 2021-07-21 NOTE — TELEPHONE ENCOUNTER
Shipment not received from [facility] - it was supposedly shipped 7/8, to be received 7/14, tracking states still in transit  Patient was advised by insurance to request #20 to a retail pharmacy

## 2021-09-20 DIAGNOSIS — F41.0 PANIC ATTACKS: ICD-10-CM

## 2021-09-20 DIAGNOSIS — F43.10 PTSD (POST-TRAUMATIC STRESS DISORDER): ICD-10-CM

## 2021-09-20 RX ORDER — CITALOPRAM 20 MG/1
30 TABLET ORAL DAILY
Qty: 90 TABLET | Refills: 1 | Status: SHIPPED | OUTPATIENT
Start: 2021-09-20 | End: 2021-10-22 | Stop reason: SDUPTHER

## 2021-09-20 NOTE — TELEPHONE ENCOUNTER
When short supply was sent to retail pharm, the mail order script got canceled  Patient is now about to run out

## 2021-10-22 ENCOUNTER — OFFICE VISIT (OUTPATIENT)
Dept: FAMILY MEDICINE CLINIC | Facility: HOSPITAL | Age: 51
End: 2021-10-22
Payer: COMMERCIAL

## 2021-10-22 VITALS
HEART RATE: 70 BPM | WEIGHT: 173 LBS | BODY MASS INDEX: 30.65 KG/M2 | DIASTOLIC BLOOD PRESSURE: 78 MMHG | OXYGEN SATURATION: 97 % | HEIGHT: 63 IN | SYSTOLIC BLOOD PRESSURE: 126 MMHG

## 2021-10-22 DIAGNOSIS — Z72.0 TOBACCO ABUSE: ICD-10-CM

## 2021-10-22 DIAGNOSIS — J43.8 OTHER EMPHYSEMA (HCC): ICD-10-CM

## 2021-10-22 DIAGNOSIS — J30.1 SEASONAL ALLERGIC RHINITIS DUE TO POLLEN: ICD-10-CM

## 2021-10-22 DIAGNOSIS — F41.0 PANIC ATTACKS: ICD-10-CM

## 2021-10-22 DIAGNOSIS — F43.10 PTSD (POST-TRAUMATIC STRESS DISORDER): Primary | ICD-10-CM

## 2021-10-22 DIAGNOSIS — Z12.31 ENCOUNTER FOR SCREENING MAMMOGRAM FOR MALIGNANT NEOPLASM OF BREAST: ICD-10-CM

## 2021-10-22 DIAGNOSIS — K58.0 IRRITABLE BOWEL SYNDROME WITH DIARRHEA: ICD-10-CM

## 2021-10-22 DIAGNOSIS — E78.2 MIXED HYPERLIPIDEMIA: ICD-10-CM

## 2021-10-22 PROCEDURE — 99214 OFFICE O/P EST MOD 30 MIN: CPT | Performed by: PHYSICIAN ASSISTANT

## 2021-10-22 PROCEDURE — 3725F SCREEN DEPRESSION PERFORMED: CPT | Performed by: PHYSICIAN ASSISTANT

## 2021-10-22 PROCEDURE — 3008F BODY MASS INDEX DOCD: CPT | Performed by: PHYSICIAN ASSISTANT

## 2021-10-22 RX ORDER — MONTELUKAST SODIUM 10 MG/1
10 TABLET ORAL
Qty: 90 TABLET | Refills: 3 | Status: SHIPPED | OUTPATIENT
Start: 2021-10-22

## 2021-10-22 RX ORDER — CITALOPRAM 20 MG/1
30 TABLET ORAL DAILY
Qty: 140 TABLET | Refills: 1 | Status: SHIPPED | OUTPATIENT
Start: 2021-10-22 | End: 2021-10-27 | Stop reason: SDUPTHER

## 2021-10-22 RX ORDER — CITALOPRAM 20 MG/1
30 TABLET ORAL DAILY
Qty: 140 TABLET | Refills: 1 | Status: SHIPPED | OUTPATIENT
Start: 2021-10-22 | End: 2022-01-18

## 2021-11-22 ENCOUNTER — TELEPHONE (OUTPATIENT)
Dept: PSYCHIATRY | Facility: CLINIC | Age: 51
End: 2021-11-22

## 2021-11-22 ENCOUNTER — TELEPHONE (OUTPATIENT)
Dept: FAMILY MEDICINE CLINIC | Facility: HOSPITAL | Age: 51
End: 2021-11-22

## 2021-11-24 ENCOUNTER — TELEPHONE (OUTPATIENT)
Dept: PSYCHIATRY | Facility: CLINIC | Age: 51
End: 2021-11-24

## 2021-11-24 ENCOUNTER — TELEMEDICINE (OUTPATIENT)
Dept: PSYCHIATRY | Facility: CLINIC | Age: 51
End: 2021-11-24

## 2021-11-24 DIAGNOSIS — F33.1 MDD (MAJOR DEPRESSIVE DISORDER), RECURRENT EPISODE, MODERATE (HCC): ICD-10-CM

## 2021-11-24 DIAGNOSIS — F41.0 PANIC ATTACKS: ICD-10-CM

## 2021-11-24 DIAGNOSIS — Z53.29 NO-SHOW FOR APPOINTMENT: Primary | ICD-10-CM

## 2021-11-24 DIAGNOSIS — F43.10 PTSD (POST-TRAUMATIC STRESS DISORDER): ICD-10-CM

## 2021-11-24 PROBLEM — F41.9 ANXIETY: Status: RESOLVED | Noted: 2018-02-14 | Resolved: 2021-11-24

## 2022-01-18 ENCOUNTER — OFFICE VISIT (OUTPATIENT)
Dept: PSYCHIATRY | Facility: CLINIC | Age: 52
End: 2022-01-18
Payer: COMMERCIAL

## 2022-01-18 DIAGNOSIS — F41.1 GAD (GENERALIZED ANXIETY DISORDER): ICD-10-CM

## 2022-01-18 DIAGNOSIS — F33.1 MAJOR DEPRESSIVE DISORDER, RECURRENT EPISODE, MODERATE (HCC): Primary | ICD-10-CM

## 2022-01-18 PROBLEM — F33.9 DEPRESSION, RECURRENT (HCC): Status: ACTIVE | Noted: 2022-01-18

## 2022-01-18 PROCEDURE — 90792 PSYCH DIAG EVAL W/MED SRVCS: CPT | Performed by: PSYCHIATRY & NEUROLOGY

## 2022-01-18 RX ORDER — SERTRALINE HYDROCHLORIDE 100 MG/1
100 TABLET, FILM COATED ORAL DAILY
Qty: 90 TABLET | Refills: 0 | Status: SHIPPED | OUTPATIENT
Start: 2022-01-18 | End: 2022-03-10

## 2022-01-18 RX ORDER — QUETIAPINE FUMARATE 25 MG/1
25 TABLET, FILM COATED ORAL
Qty: 90 TABLET | Refills: 0 | Status: SHIPPED | OUTPATIENT
Start: 2022-01-18 | End: 2022-03-10 | Stop reason: SDUPTHER
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
## 2022-01-18 NOTE — BH TREATMENT PLAN
TREATMENT PLAN (Medication Management Only)        [facility]    Name and Date of Birth:  Magdi Nevarez 45 y o  1970  Date of Treatment Plan: January 18, 2022  Diagnosis/Diagnoses:    1  Major depressive disorder, recurrent episode, moderate ([address] )    2  KIP (generalized anxiety disorder)      Strengths/Personal Resources for Self-Care: supportive friends, taking medications as prescribed, ability to communicate well, ability to understand psychiatric illness, average or above intelligence, financial means, general fund of knowledge, good physical health, motivation for treatment, ability to negotiate basic needs, Christian affiliation, special hobby/interest, stable employment, well educated, willingness to work on problems  Area/Areas of need (in own words): depressive symptoms  1  Long Term Goal: continue improvement in depression  Target Date: 6 months - 7/18/2022  Person/Persons responsible for completion of goal: [address] [facility]  2  Short Term Objective (s) - How will we reach this goal?:   A  Provider new recommended medication/dosage changes and/or continue medication(s): continue current medications as prescribed (Zoloft)  B   Resume psychotherapy and resume working on her cars  Target Date: 2 months - 3/18/2022  Person/Persons Responsible for Completion of Goal: [address] [facility]  Progress Towards Goals: initiating treatment  Treatment Modality: medication management every 2 months  Review due 6 months from date of this plan: 6 months - 7/18/2022  Expected length of service: ongoing treatment unless revised  My Physician/PA/NP and I have developed this plan together and I agree to work on the goals and objectives  I understand the treatment goals that were developed for my treatment

## 2022-01-19 ENCOUNTER — TELEPHONE (OUTPATIENT)
Dept: PSYCHIATRY | Facility: CLINIC | Age: 52
End: 2022-01-19

## 2022-01-19 NOTE — TELEPHONE ENCOUNTER
----- Message from Samanta Benítez sent at 1/18/2022  2:58 PM EST -----  Regarding: RE: Previous patient  Yes of course  Thank you  ----- Message -----  From: March Stands  Sent: 1/18/2022   2:48 PM EST  To: Samanta Benítez  Subject: Previous patient                                 This patient just started seeing Dr Mandy Sinha  She is requesting to start therapy with you again  If you are willing to do so, would you please call her  Thanks

## 2022-01-24 ENCOUNTER — TELEPHONE (OUTPATIENT)
Dept: PSYCHIATRY | Facility: CLINIC | Age: 52
End: 2022-01-24

## 2022-02-03 ENCOUNTER — TELEMEDICINE (OUTPATIENT)
Dept: BEHAVIORAL/MENTAL HEALTH CLINIC | Facility: CLINIC | Age: 52
End: 2022-02-03
Payer: COMMERCIAL

## 2022-02-03 ENCOUNTER — HOSPITAL ENCOUNTER (OUTPATIENT)
Dept: MAMMOGRAPHY | Facility: IMAGING CENTER | Age: 52
Discharge: HOME | End: 2022-02-03
Payer: COMMERCIAL

## 2022-02-03 VITALS — WEIGHT: 175 LBS | BODY MASS INDEX: 31.01 KG/M2 | HEIGHT: 63 IN

## 2022-02-03 DIAGNOSIS — F41.1 GAD (GENERALIZED ANXIETY DISORDER): Primary | ICD-10-CM

## 2022-02-03 DIAGNOSIS — F33.1 MAJOR DEPRESSIVE DISORDER, RECURRENT EPISODE, MODERATE (HCC): ICD-10-CM

## 2022-02-03 DIAGNOSIS — F43.10 PTSD (POST-TRAUMATIC STRESS DISORDER): ICD-10-CM

## 2022-02-03 DIAGNOSIS — Z12.31 ENCOUNTER FOR SCREENING MAMMOGRAM FOR MALIGNANT NEOPLASM OF BREAST: ICD-10-CM

## 2022-02-03 PROCEDURE — 77067 SCR MAMMO BI INCL CAD: CPT

## 2022-02-03 PROCEDURE — 90791 PSYCH DIAGNOSTIC EVALUATION: CPT | Performed by: SOCIAL WORKER

## 2022-02-03 PROCEDURE — 77063 BREAST TOMOSYNTHESIS BI: CPT

## 2022-02-03 NOTE — PSYCH
Virtual Regular Visit    Verification of patient location:    Patient is located in the following state in which I hold an active license [place]      Assessment/Plan:    Problem List Items Addressed This Visit     None          Goals addressed in session: Goal 1          Reason for visit is   Chief Complaint   Patient presents with    Virtual Regular Visit        Encounter provider Brendon Stephens    Provider located at [address] [place] [id number] [phone]      Recent Visits  No visits were found meeting these conditions  Showing recent visits within past 7 days and meeting all other requirements  Future Appointments  No visits were found meeting these conditions  Showing future appointments within next 150 days and meeting all other requirements       The patient was identified by name and date of birth  Carlee Farley was informed that this is a telemedicine visit and that the visit is being conducted throughEpic Embedded and patient was informed this is a secure, HIPAA-complaint platform  She agrees to proceed     My office door was closed  No one else was in the room  She acknowledged consent and understanding of privacy and security of the video platform  The patient has agreed to participate and understands they can discontinue the visit at any time  Patient is aware this is a billable service  Subjective  Carlee Farley is a 45 y o  female See intake assessment         HPI     Past Medical History:   Diagnosis Date    COPD (chronic obstructive pulmonary disease) ([address] )     Dysmenorrhea     Emphysema of lung ([address] )     IBS (irritable bowel syndrome)     Liver lesion 1/9/2018    MDD (major depressive disorder), recurrent episode, moderate ([address] ) 2/14/2018    Menopausal symptoms     Other emphysema ([address] ) 2/23/2018    PTSD (post-traumatic stress disorder) 2/14/2018    Skin disorder     Tick bite        Past Surgical History:   Procedure Laterality Date    CHOLECYSTECTOMY      KNEE SURGERY      MICRODISCECTOMY LUMBAR  2003    TONSILLECTOMY  1986       Current Outpatient Medications   Medication Sig Dispense Refill    albuterol (PROAIR HFA) 90 mcg/act inhaler Inhale 2 puffs every 6 (six) hours as needed for wheezing or shortness of breath 1 Inhaler 6    B Complex-Folic Acid (SUPER B COMPLEX MAXI) TABS Take by mouth      Breo Ellipta 200-25 MCG/INH inhaler USE 1 INHALATION DAILY (RINSE MOUTH AFTER USE) 180 each 0    Cholecalciferol (VITAMIN D3) 1000 units CAPS Take 2 capsules by mouth daily      Magnesium 400 MG CAPS Take 400 mg by mouth daily       montelukast (SINGULAIR) 10 mg tablet Take 1 tablet (10 mg total) by mouth daily at bedtime 90 tablet 3    Multiple Vitamin (MULTI-VITAMIN DAILY PO) Take 1 tablet by mouth daily      QUEtiapine (SEROquel) 25 mg tablet Take 1 tablet (25 mg total) by mouth daily at bedtime 90 tablet 0    sertraline (ZOLOFT) 100 mg tablet Take 1 tablet (100 mg total) by mouth daily 90 tablet 0     No current facility-administered medications for this visit  Allergies   Allergen Reactions    Levaquin [Levofloxacin] Throat Swelling    Penicillins Throat Swelling    Clonidine Angioedema       Review of Systems    Video Exam    There were no vitals filed for this visit  Physical Exam     I spent 45 minutes directly with the patient during this visit    VIRTUAL VISIT Sarah Val 26 verbally agrees to participate in [employer]  Pt is aware that [employer] could be limited without vital signs or the ability to perform a full hands-on physical exam  Janna Bhagat understands she or the provider may request at any time to terminate the video visit and request the patient to seek care or treatment in person
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
## 2022-02-11 ENCOUNTER — TELEPHONE (OUTPATIENT)
Dept: PSYCHIATRY | Facility: CLINIC | Age: 52
End: 2022-02-11

## 2022-02-15 ENCOUNTER — TELEMEDICINE (OUTPATIENT)
Dept: PSYCHIATRY | Facility: CLINIC | Age: 52
End: 2022-02-15

## 2022-02-15 DIAGNOSIS — F33.1 MAJOR DEPRESSIVE DISORDER, RECURRENT EPISODE, MODERATE (HCC): ICD-10-CM

## 2022-02-15 DIAGNOSIS — F41.1 GAD (GENERALIZED ANXIETY DISORDER): Primary | ICD-10-CM

## 2022-02-15 PROBLEM — F33.9 DEPRESSION, RECURRENT (HCC): Status: RESOLVED | Noted: 2022-01-18 | Resolved: 2022-02-15

## 2022-02-15 RX ORDER — HYDROXYZINE HYDROCHLORIDE 25 MG/1
25 TABLET, FILM COATED ORAL 3 TIMES DAILY
Qty: 90 TABLET | Refills: 1 | Status: SHIPPED | OUTPATIENT
Start: 2022-02-15 | End: 2022-03-10 | Stop reason: SDUPTHER
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
## 2022-02-23 ENCOUNTER — TELEMEDICINE (OUTPATIENT)
Dept: BEHAVIORAL/MENTAL HEALTH CLINIC | Facility: CLINIC | Age: 52
End: 2022-02-23
Payer: COMMERCIAL

## 2022-02-23 DIAGNOSIS — F43.10 PTSD (POST-TRAUMATIC STRESS DISORDER): ICD-10-CM

## 2022-02-23 DIAGNOSIS — F33.1 MAJOR DEPRESSIVE DISORDER, RECURRENT EPISODE, MODERATE (HCC): Primary | ICD-10-CM

## 2022-02-23 DIAGNOSIS — F41.1 GAD (GENERALIZED ANXIETY DISORDER): ICD-10-CM

## 2022-02-23 PROCEDURE — 90834 PSYTX W PT 45 MINUTES: CPT | Performed by: SOCIAL WORKER
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
## 2022-03-08 ENCOUNTER — TELEPHONE (OUTPATIENT)
Dept: FAMILY MEDICINE CLINIC | Facility: HOSPITAL | Age: 52
End: 2022-03-08

## 2022-03-08 DIAGNOSIS — Z11.4 SCREENING FOR HIV (HUMAN IMMUNODEFICIENCY VIRUS): ICD-10-CM

## 2022-03-08 DIAGNOSIS — K58.0 IRRITABLE BOWEL SYNDROME WITH DIARRHEA: ICD-10-CM

## 2022-03-08 DIAGNOSIS — K76.9 LIVER LESION: ICD-10-CM

## 2022-03-08 DIAGNOSIS — E78.2 MIXED HYPERLIPIDEMIA: Primary | ICD-10-CM

## 2022-03-08 DIAGNOSIS — Z13.29 SCREENING FOR THYROID DISORDER: ICD-10-CM

## 2022-03-08 NOTE — TELEPHONE ENCOUNTER
NEEDS LAB WORK ORDERS [facility] from 10/22/21 visit  Good for one year    But needs to go to [facility]

## 2022-03-10 ENCOUNTER — OFFICE VISIT (OUTPATIENT)
Dept: PSYCHIATRY | Facility: CLINIC | Age: 52
End: 2022-03-10
Payer: COMMERCIAL

## 2022-03-10 DIAGNOSIS — F33.1 MAJOR DEPRESSIVE DISORDER, RECURRENT EPISODE, MODERATE (HCC): Primary | ICD-10-CM

## 2022-03-10 DIAGNOSIS — F41.1 GAD (GENERALIZED ANXIETY DISORDER): ICD-10-CM

## 2022-03-10 PROCEDURE — 99213 OFFICE O/P EST LOW 20 MIN: CPT | Performed by: PSYCHIATRY & NEUROLOGY

## 2022-03-10 RX ORDER — SERTRALINE HYDROCHLORIDE 25 MG/1
25 TABLET, FILM COATED ORAL DAILY
Qty: 90 TABLET | Refills: 0 | Status: SHIPPED | OUTPATIENT
Start: 2022-03-10 | End: 2022-06-22 | Stop reason: SDUPTHER

## 2022-03-10 RX ORDER — QUETIAPINE FUMARATE 25 MG/1
25 TABLET, FILM COATED ORAL
Qty: 90 TABLET | Refills: 0 | Status: SHIPPED | OUTPATIENT
Start: 2022-03-10 | End: 2022-05-05 | Stop reason: SDUPTHER

## 2022-03-10 RX ORDER — SERTRALINE HYDROCHLORIDE 100 MG/1
100 TABLET, FILM COATED ORAL DAILY
Qty: 90 TABLET | Refills: 0 | Status: SHIPPED | OUTPATIENT
Start: 2022-03-10 | End: 2022-06-22 | Stop reason: SDUPTHER

## 2022-03-10 RX ORDER — HYDROXYZINE HYDROCHLORIDE 25 MG/1
25 TABLET, FILM COATED ORAL 3 TIMES DAILY
Qty: 270 TABLET | Refills: 0 | Status: SHIPPED | OUTPATIENT
Start: 2022-03-10 | End: 2022-06-22 | Stop reason: SDUPTHER
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
## 2022-03-14 LAB
25(OH)D3 SERPL-MCNC: 49 NG/ML (ref 30–100)
ALBUMIN SERPL-MCNC: 4.6 G/DL (ref 3.6–5.1)
ALBUMIN/GLOB SERPL: 1.9 (CALC) (ref 1–2.5)
ALP SERPL-CCNC: 83 U/L (ref 37–153)
ALT SERPL-CCNC: 16 U/L (ref 6–29)
AST SERPL-CCNC: 19 U/L (ref 10–35)
BASOPHILS # BLD AUTO: 54 CELLS/UL (ref 0–200)
BASOPHILS NFR BLD AUTO: 0.5 %
BILIRUB SERPL-MCNC: 0.3 MG/DL (ref 0.2–1.2)
BUN SERPL-MCNC: 20 MG/DL (ref 7–25)
BUN/CREAT SERPL: NORMAL (CALC) (ref 6–22)
CALCIUM SERPL-MCNC: 9.9 MG/DL (ref 8.6–10.4)
CHLORIDE SERPL-SCNC: 104 MMOL/L (ref 98–110)
CHOLEST SERPL-MCNC: 246 MG/DL
CHOLEST/HDLC SERPL: 4.3 (CALC)
CO2 SERPL-SCNC: 26 MMOL/L (ref 20–32)
CREAT SERPL-MCNC: 0.94 MG/DL (ref 0.5–1.05)
EOSINOPHIL # BLD AUTO: 130 CELLS/UL (ref 15–500)
EOSINOPHIL NFR BLD AUTO: 1.2 %
ERYTHROCYTE [DISTWIDTH] IN BLOOD BY AUTOMATED COUNT: 12.3 % (ref 11–15)
GLOBULIN SER CALC-MCNC: 2.4 G/DL (CALC) (ref 1.9–3.7)
GLUCOSE SERPL-MCNC: 96 MG/DL (ref 65–99)
HCT VFR BLD AUTO: 44.6 % (ref 35–45)
HDLC SERPL-MCNC: 57 MG/DL
HGB BLD-MCNC: 15.2 G/DL (ref 11.7–15.5)
IRON SERPL-MCNC: 107 MCG/DL (ref 45–160)
LDLC SERPL CALC-MCNC: 151 MG/DL (CALC)
LYMPHOCYTES # BLD AUTO: 5260 CELLS/UL (ref 850–3900)
LYMPHOCYTES NFR BLD AUTO: 48.7 %
MAGNESIUM SERPL-MCNC: 2 MG/DL (ref 1.5–2.5)
MCH RBC QN AUTO: 31.9 PG (ref 27–33)
MCHC RBC AUTO-ENTMCNC: 34.1 G/DL (ref 32–36)
MCV RBC AUTO: 93.5 FL (ref 80–100)
MONOCYTES # BLD AUTO: 724 CELLS/UL (ref 200–950)
MONOCYTES NFR BLD AUTO: 6.7 %
NEUTROPHILS # BLD AUTO: 4633 CELLS/UL (ref 1500–7800)
NEUTROPHILS NFR BLD AUTO: 42.9 %
NONHDLC SERPL-MCNC: 189 MG/DL (CALC)
PLATELET # BLD AUTO: 350 THOUSAND/UL (ref 140–400)
PMV BLD REES-ECKER: 9.7 FL (ref 7.5–12.5)
POTASSIUM SERPL-SCNC: 4 MMOL/L (ref 3.5–5.3)
PROT SERPL-MCNC: 7 G/DL (ref 6.1–8.1)
RBC # BLD AUTO: 4.77 MILLION/UL (ref 3.8–5.1)
SL AMB EGFR AFRICAN AMERICAN: 81 ML/MIN/1.73M2
SL AMB EGFR NON AFRICAN AMERICAN: 70 ML/MIN/1.73M2
SODIUM SERPL-SCNC: 140 MMOL/L (ref 135–146)
TRIGL SERPL-MCNC: 238 MG/DL
TSH SERPL-ACNC: 2.79 MIU/L
WBC # BLD AUTO: 10.8 THOUSAND/UL (ref 3.8–10.8)

## 2022-04-07 ENCOUNTER — TELEMEDICINE (OUTPATIENT)
Dept: BEHAVIORAL/MENTAL HEALTH CLINIC | Facility: CLINIC | Age: 52
End: 2022-04-07
Payer: COMMERCIAL

## 2022-04-07 DIAGNOSIS — F41.1 GAD (GENERALIZED ANXIETY DISORDER): ICD-10-CM

## 2022-04-07 DIAGNOSIS — F33.1 MAJOR DEPRESSIVE DISORDER, RECURRENT EPISODE, MODERATE (HCC): Primary | ICD-10-CM

## 2022-04-07 DIAGNOSIS — F43.10 PTSD (POST-TRAUMATIC STRESS DISORDER): ICD-10-CM

## 2022-04-07 PROCEDURE — 90834 PSYTX W PT 45 MINUTES: CPT | Performed by: SOCIAL WORKER

## 2022-04-08 ENCOUNTER — TELEPHONE (OUTPATIENT)
Dept: FAMILY MEDICINE CLINIC | Facility: HOSPITAL | Age: 52
End: 2022-04-08

## 2022-04-08 NOTE — TELEPHONE ENCOUNTER
Spoke with pt  She is unable to be seen today due to no appt  Pt complains of strong urine, chills and no fever  She sometimes has heart palpations  Her cholesterol is very high  She is concerned because of a family hx of heart issues  She was also concerned with her labs  Her lymphocytes that are very elevated  Please advise   She is scheduled to be seen 04/11/2022 with Dr Barney Smith

## 2022-04-08 NOTE — TELEPHONE ENCOUNTER
LM ON OUR VM @ 1207PM    HAS A FEW URI SYMPTOMS - ASKING IF SHE NEEDS TO BE SEEN? PATIENT ALSO ASKING ABOUT MESSAGE LEFT YESTERDAY? ANY RESPONSE?     PCB

## 2022-04-08 NOTE — TELEPHONE ENCOUNTER
LM for patient, for any urgent issues, go to ER or urgent care  Provider on call if need to call back

## 2022-04-09 LAB — HIV 1+2 AB+HIV1 P24 AG SERPL QL IA: NORMAL

## 2022-04-11 ENCOUNTER — OFFICE VISIT (OUTPATIENT)
Dept: FAMILY MEDICINE CLINIC | Facility: HOSPITAL | Age: 52
End: 2022-04-11
Payer: COMMERCIAL

## 2022-04-11 VITALS
SYSTOLIC BLOOD PRESSURE: 140 MMHG | TEMPERATURE: 98.9 F | HEIGHT: 63 IN | HEART RATE: 88 BPM | BODY MASS INDEX: 29.23 KG/M2 | OXYGEN SATURATION: 96 % | DIASTOLIC BLOOD PRESSURE: 82 MMHG | RESPIRATION RATE: 16 BRPM | WEIGHT: 165 LBS

## 2022-04-11 DIAGNOSIS — I49.9 IRREGULAR HEART BEAT: ICD-10-CM

## 2022-04-11 DIAGNOSIS — D72.820 LYMPHOCYTOSIS: ICD-10-CM

## 2022-04-11 DIAGNOSIS — R82.90 BAD ODOR OF URINE: ICD-10-CM

## 2022-04-11 DIAGNOSIS — J43.8 OTHER EMPHYSEMA (HCC): ICD-10-CM

## 2022-04-11 DIAGNOSIS — M54.50 LOW BACK PAIN, UNSPECIFIED BACK PAIN LATERALITY, UNSPECIFIED CHRONICITY, UNSPECIFIED WHETHER SCIATICA PRESENT: Primary | ICD-10-CM

## 2022-04-11 LAB — SINUS: 88 CM

## 2022-04-11 PROCEDURE — 93000 ELECTROCARDIOGRAM COMPLETE: CPT | Performed by: INTERNAL MEDICINE

## 2022-04-11 PROCEDURE — 99214 OFFICE O/P EST MOD 30 MIN: CPT | Performed by: INTERNAL MEDICINE

## 2022-04-11 NOTE — PSYCH
Virtual Regular Visit    Verification of patient location:    Patient is located in the following state in which I hold an active license [place]      Assessment/Plan:    Problem List Items Addressed This Visit     None          Goals addressed in session: Goal 1          Reason for visit is   Chief Complaint   Patient presents with    Virtual Regular Visit        Encounter provider Yoan Coto    Provider located at [address] [place] [address] [place] [id number] [phone]      Recent Visits  No visits were found meeting these conditions  Showing recent visits within past 7 days and meeting all other requirements  Future Appointments  No visits were found meeting these conditions  Showing future appointments within next 150 days and meeting all other requirements       The patient was identified by name and date of birth  Britni Blanton was informed that this is a telemedicine visit and that the visit is being conducted through [facility] and patient was informed this is a secure, HIPAA-complaint platform  She agrees to proceed     My office door was closed  No one else was in the room  She acknowledged consent and understanding of privacy and security of the video platform  The patient has agreed to participate and understands they can discontinue the visit at any time  Patient is aware this is a billable service  Subjective  Britni Blanton is a 45 y o  female LAURA- Blair Hoffman stated that she continues to experience stress in terms of the care of her grandmother and the family members involved  She discussed the most recent events and how they unfolded  She discussed her family members animosity towards her and their lack of respect  Reviewing the family dynamics and their dysfunctional nature   Discussing current relationships that she deems as healthy and ow this has changed her perspective on the family dynamics  Continuing to discuss maintain boundaries with her family members and continuing to assert herself in difficult situations  Giving supportive therapy  A- Progress- Continuing to work towards completing her treatment goals  P-Continue treatment        HPI     Past Medical History:   Diagnosis Date    [facility]     Dysmenorrhea     [facility]     IBS (irritable bowel syndrome)     Liver lesion 1/9/2018    MDD (major depressive disorder), recurrent episode, moderate [facility] 2/14/2018    Menopausal symptoms     Other emph[facility] ) 2/23/2018    PTSD (post-traumatic stress disorder) 2/14/2018    Skin disorder     Tick bite        Past Surgical History:   Procedure Laterality Date    CHOLECYSTECTOMY      KNEE SURGERY      MICRODISCECTOMY LUMBAR  2003    TONSILLECTOMY  1986       Current Outpatient Medications   Medication Sig Dispense Refill    albuterol (PROAIR HFA) 90 mcg/act inhaler Inhale 2 puffs every 6 (six) hours as needed for wheezing or shortness of breath 1 Inhaler 6    B Complex-Folic Acid (SUPER B COMPLEX MAXI) TABS Take by mouth      Breo Ellipta 200-25 MCG/INH inhaler USE 1 INHALATION DAILY (RINSE MOUTH AFTER USE) 180 each 0    Cholecalciferol (VITAMIN D3) 1000 units CAPS Take 2 capsules by mouth daily      hydrOXYzine HCL (ATARAX) 25 mg tablet Take 1 tablet (25 mg total) by mouth 3 (three) times a day 270 tablet 0    Magnesium 400 MG CAPS Take 400 mg by mouth daily       montelukast (SINGULAIR) 10 mg tablet Take 1 tablet (10 mg total) by mouth daily at bedtime 90 tablet 3    Multiple Vitamin (MULTI-VITAMIN DAILY PO) Take 1 tablet by mouth daily      QUEtiapine (SEROquel) 25 mg tablet Take 1 tablet (25 mg total) by mouth daily at bedtime 90 tablet 0    sertraline (ZOLOFT) 100 mg tablet Take 1 tablet (100 mg total) by mouth daily 90 tablet 0    sertraline (Zoloft) 25 mg tablet Take 1 tablet (25 mg total) by mouth daily 90 tablet 0     No current facility-administered medications for this visit  Allergies   Allergen Reactions    Levaquin [Levofloxacin] Throat Swelling    Penicillins Throat Swelling    Clonidine Angioedema       Review of Systems    Video Exam    There were no vitals filed for this visit  Physical Exam     I spent 45 minutes directly with the patient during this visit    VIRTUAL VISIT Sarah Neal 26 verbally agrees to participate in [employer]  Pt is aware that [employer] could be limited without vital signs or the ability to perform a full hands-on physical exam  Janna Pickett understands she or the provider may request at any time to terminate the video visit and request the patient to seek care or treatment in person

## 2022-04-11 NOTE — PROGRESS NOTES
Assessment/Plan:    Emphysema ([facility] )  Patient has emphysema  Lungs are clear to auscultation without wheezing  Continue Breo Ellipta and albuterol  She is not ready for quitting smoking       Diagnoses and all orders for this visit:    Low back pain, unspecified back pain laterality, unspecified chronicity, unspecified whether sciatica present  -     POCT urine dip auto non-scope    Bad odor of urine  -     POCT urine dip auto non-scope    Irregular heart beat  -     POCT ECG    Lymphocytosis  -     Ambulatory Referral to Hematology / Oncology; Future  -     CBC and differential; Future  -     CBC and differential    Other emphysema (HCC)    Other orders  -     Omega-3 Fatty Acids (OMEGA-3 FISH OIL PO); Take by mouth 1 daily  (-er pt---3/6/9)          EKG shows normal sinus rhythm  Urine dip shows no blood, nitrites or leukocytes  I doubt UTI  Patient had lymphocytosis on CBC on review of records in 2020  I doubt that patient would have lymphoma but will refer patient to Hematology for evaluation of episode lymphocytosis  I will also recheck patient's CBC with diff  Patient will seek alcohol G for evaluation of vaginal dryness  I told her that she has no UTI   sweats could be also due to postmenopausal state  Subjective:      Patient ID: Kendra Sacnhez is a 45 y o  female  Patient presents with chief complaint of drenching sweats that occur multiple times a day and have been going on for years  She would all of a sudden feel sweaty and breakdown and sweat  Her friend was diagnosed with lymphoma recently  CBC showed lymphocytosis in March which was not new  She denies unintentional weight loss  She is trying to lose some weight  Denies fevers  At times she has irregular heartbeat feelings without accompanying presyncopal or syncopal episodes  Irregular heart beat feelings may last for a minute or 2 then dissipate  Denies chest pain or shortness of breath      She still has a cough denies wheezing or shortness of breath  She uses her inhalers regularly  She frequently wakes up with a pressure in the low back area without radiation  She gets episodes of foul swelling urine but denies dysuria  She does have vaginal dryness  Denies signs of GI  bleeding  Her TSH was normal in March, CBC showed no leukocytosis      Patient presents for follow-up of chronic conditions as detailed in the assessment and plan  The following portions of the patient's history were reviewed and updated as appropriate: current medications, past family history, past medical history, past social history, past surgical history and problem list     Review of Systems      Objective:    /82   Pulse 88   Temp 98 9 °F (37 2 °C) (Tympanic)   Resp 16   Ht 5' 3" (1 6 m)   Wt 74 8 kg (165 lb)   LMP  (LMP Unknown)   SpO2 96%   BMI 29 23 kg/m²      Physical Exam  Constitutional:       General: She is not in acute distress  Appearance: She is not ill-appearing or toxic-appearing  HENT:      Head: Normocephalic  Eyes:      Conjunctiva/sclera: Conjunctivae normal    Cardiovascular:      Rate and Rhythm: Normal rate and regular rhythm  Heart sounds: No murmur heard  Pulmonary:      Effort: No respiratory distress  Breath sounds: No wheezing or rales  Chest:   Breasts:      Right: No axillary adenopathy or supraclavicular adenopathy  Left: No axillary adenopathy or supraclavicular adenopathy  Abdominal:      General: Bowel sounds are normal  There is no distension  Palpations: Abdomen is soft  There is no mass  Tenderness: There is no abdominal tenderness  Musculoskeletal:         General: No tenderness  Cervical back: Neck supple  Lymphadenopathy:      Head:      Right side of head: No submental or submandibular adenopathy  Left side of head: No submental or submandibular adenopathy  Cervical: No cervical adenopathy        Upper Body: Right upper body: No supraclavicular or axillary adenopathy  Left upper body: No supraclavicular or axillary adenopathy  Lower Body: No right inguinal adenopathy  No left inguinal adenopathy  Skin:     General: Skin is warm and dry  Neurological:      Mental Status: She is alert and oriented to person, place, and time  Cranial Nerves: No cranial nerve deficit  Motor: No weakness        Gait: Gait normal    Psychiatric:         Mood and Affect: Mood normal              Fabio Gomez MD

## 2022-04-11 NOTE — ASSESSMENT & PLAN NOTE
Patient has emphysema  Lungs are clear to auscultation without wheezing  Continue Breo Ellipta and albuterol    She is not ready for quitting smoking

## 2022-04-12 NOTE — TELEPHONE ENCOUNTER
Patient was seen on 04/11 by Dr Yolande Goltz to address this-she did not have evidence UTI but because she is concerned about elevated lymphocytes she is being referred to Hematology team

## 2022-04-14 ENCOUNTER — TELEPHONE (OUTPATIENT)
Dept: HEMATOLOGY ONCOLOGY | Facility: CLINIC | Age: 52
End: 2022-04-14

## 2022-04-14 NOTE — TELEPHONE ENCOUNTER
Made attempt to schedule new pt appt, Patient stating that she would call back after she does her labs to schedule an appointment

## 2022-04-21 ENCOUNTER — OFFICE VISIT (OUTPATIENT)
Dept: OBGYN CLINIC | Facility: CLINIC | Age: 52
End: 2022-04-21
Payer: COMMERCIAL

## 2022-04-21 VITALS
WEIGHT: 168.4 LBS | BODY MASS INDEX: 29.84 KG/M2 | HEIGHT: 63 IN | SYSTOLIC BLOOD PRESSURE: 110 MMHG | DIASTOLIC BLOOD PRESSURE: 64 MMHG

## 2022-04-21 DIAGNOSIS — Z12.31 BREAST CANCER SCREENING BY MAMMOGRAM: ICD-10-CM

## 2022-04-21 DIAGNOSIS — Z01.411 ABNORMAL FEMALE PELVIC EXAM: Primary | ICD-10-CM

## 2022-04-21 DIAGNOSIS — Z12.4 SCREENING FOR MALIGNANT NEOPLASM OF THE CERVIX: ICD-10-CM

## 2022-04-21 DIAGNOSIS — N95.2 ATROPHIC VAGINITIS: ICD-10-CM

## 2022-04-21 PROCEDURE — S0610 ANNUAL GYNECOLOGICAL EXAMINA: HCPCS | Performed by: OBSTETRICS & GYNECOLOGY

## 2022-04-21 PROCEDURE — 3008F BODY MASS INDEX DOCD: CPT | Performed by: INTERNAL MEDICINE

## 2022-04-21 RX ORDER — ESTRADIOL 0.1 MG/G
1 CREAM VAGINAL 2 TIMES WEEKLY
Qty: 42 G | Refills: 3 | Status: SHIPPED | OUTPATIENT
Start: 2022-04-21

## 2022-04-21 NOTE — PROGRESS NOTES
[id number]   [address] [place] [address], [address] [facility]    ASSESSMENT/PLAN: Kathy Lepe is a 45 y o  Nazanin Blocker who presents for annual gynecologic exam     Encounter for routine gynecologic examination  - Routine well woman exam completed today  - Cervical Cancer Screening: Current ASCCP Guidelines reviewed  Last Pap: 10/12/2020   Next Pap Due: 2023  - HPV Vaccination status: Not immunized  - Contraceptive counseling discussed  Current contraception: none  - Breast Cancer Screening: Last Mammogram 02/03/2022, ordered  - Colorectal cancer screening was not ordered  - The following were reviewed in today's visit: breast self exam, mammography screening ordered, menopause and adequate intake of calcium and vitamin D    Additional problems addressed during this visit:  1  Breast cancer screening by mammogram  -     Mammo screening bilateral w 3d & cad; Future; Expected date: 04/21/2023        CC: Annual Gynecologic Examination    HPI: Kathy Lepe is a 45 y o  Nazanin Blocker who presents for annual gynecologic examination  HPI    The following portions of the patient's history were reviewed and updated as appropriate: She  has a past medical history of COPD (chronic obstructive pulmonary disease) ([address] ), Dysmenorrhea, Emphysema of lung ([address] ), IBS (irritable bowel syndrome), Liver lesion (1/9/2018), MDD (major depressive disorder), recurrent episode, moderate ([address] ) (2/14/2018), Menopausal symptoms, Other emphysema ([address] ) (2/23/2018), PTSD (post-traumatic stress disorder) (2/14/2018), Skin disorder, and Tick bite  She  has a past surgical history that includes Cholecystectomy; Knee surgery; Microdiscectomy lumbar (2003); and Tonsillectomy (1986)  Her family history includes Bipolar disorder in her brother and mother; COPD in her maternal grandfather; Cancer in her paternal aunt; Clotting disorder in her mother; Diverticulitis in her mother; Liver cancer in her cousin; Mental illness in her mother;  No Known Problems in her maternal aunt, maternal grandmother, paternal aunt, and paternal grandmother; OCD in her sister; Prostate cancer in her paternal grandfather; Skin cancer in her father; Substance Abuse in her brother; Suicide Attempts in her brother  She  reports that she has been smoking cigarettes  She has a 15 00 pack-year smoking history  She has never used smokeless tobacco  She reports that she does not drink alcohol and does not use drugs  Current Outpatient Medications   Medication Sig Dispense Refill    albuterol (PROAIR HFA) 90 mcg/act inhaler Inhale 2 puffs every 6 (six) hours as needed for wheezing or shortness of breath 1 Inhaler 6    B Complex-Folic Acid (SUPER B COMPLEX MAXI) TABS Take by mouth      Breo Ellipta 200-25 MCG/INH inhaler USE 1 INHALATION DAILY (RINSE MOUTH AFTER USE) 180 each 0    Cholecalciferol (VITAMIN D3) 1000 units CAPS Take 2 capsules by mouth daily      hydrOXYzine HCL (ATARAX) 25 mg tablet Take 1 tablet (25 mg total) by mouth 3 (three) times a day 270 tablet 0    Magnesium 400 MG CAPS Take 400 mg by mouth daily       montelukast (SINGULAIR) 10 mg tablet Take 1 tablet (10 mg total) by mouth daily at bedtime 90 tablet 3    Multiple Vitamin (MULTI-VITAMIN DAILY PO) Take 1 tablet by mouth daily      Omega-3 Fatty Acids (OMEGA-3 FISH OIL PO) Take by mouth 1 daily  (-er pt---3/6/9)     Zhou Balderas QUEtiapine (SEROquel) 25 mg tablet Take 1 tablet (25 mg total) by mouth daily at bedtime 90 tablet 0    sertraline (ZOLOFT) 100 mg tablet Take 1 tablet (100 mg total) by mouth daily 90 tablet 0    sertraline (Zoloft) 25 mg tablet Take 1 tablet (25 mg total) by mouth daily 90 tablet 0     No current facility-administered medications for this visit  She is allergic to levaquin [levofloxacin], penicillins, and clonidine       Review of Systems      Objective:  /64 (BP Location: Left arm, Patient Position: Sitting, Cuff Size: Large)   Ht 5' 2 5" (1 588 m)   Wt 76 4 kg (168 lb 6 4 oz)   LMP  (LMP Unknown)   Breastfeeding No   BMI 30 31 kg/m²    Physical Exam      PE:  General Appearance: alert and oriented, in no acute distress  HEENT: PERRL, thyroid without masses or tenderness  Breast: No masses, tenderness, skin changes, nipple D/C or axillary or supraclavicular adenopathy  Abdomen: Soft, non-tender, non-distended, no masses, no rebound or guarding  Pelvic:       External genitalia: Normal appearance, no abnormal pigmentation, no lesions or masses  Normal [facility]  Urinary system: Urethral meatus normal, bladder non-tender  Vaginal: normal mucosa without prolapse or lesions  Normal-appearing physiologic discharge      Cervix: Normal-appearing, well-epithelialized, no gross lesions or masses No cervical motion tenderness  Adnexa: No adnexal masses or tenderness noted  Uterus: Normal-sized, regular contour, midline, mobile, no uterine tenderness  Extremities: Normal range of motion     Skin: normal, no rash or abnormalities  Neurologic: alert, oriented x3  Psychiatric: Appropriate affect, mood stable, cooperative with exam

## 2022-04-23 LAB
CLINICAL INFO: NORMAL
DATE PREVIOUS BX: NORMAL
HPV E6+E7 MRNA CVX QL NAA+PROBE: NOT DETECTED
LMP START DATE: NORMAL
SL AMB PREV. PAP:: NORMAL
SPECIMEN SOURCE CVX/VAG CYTO: NORMAL
STAT OF ADQ CVX/VAG CYTO-IMP: NORMAL

## 2022-05-05 ENCOUNTER — TELEMEDICINE (OUTPATIENT)
Dept: BEHAVIORAL/MENTAL HEALTH CLINIC | Facility: CLINIC | Age: 52
End: 2022-05-05
Payer: COMMERCIAL

## 2022-05-05 DIAGNOSIS — F43.10 PTSD (POST-TRAUMATIC STRESS DISORDER): ICD-10-CM

## 2022-05-05 DIAGNOSIS — F41.0 PANIC ATTACKS: ICD-10-CM

## 2022-05-05 DIAGNOSIS — F33.1 MAJOR DEPRESSIVE DISORDER, RECURRENT EPISODE, MODERATE (HCC): ICD-10-CM

## 2022-05-05 DIAGNOSIS — F41.1 GAD (GENERALIZED ANXIETY DISORDER): ICD-10-CM

## 2022-05-05 DIAGNOSIS — F33.1 MAJOR DEPRESSIVE DISORDER, RECURRENT EPISODE, MODERATE (HCC): Primary | ICD-10-CM

## 2022-05-05 PROCEDURE — 90834 PSYTX W PT 45 MINUTES: CPT | Performed by: SOCIAL WORKER

## 2022-05-05 RX ORDER — QUETIAPINE FUMARATE 25 MG/1
25 TABLET, FILM COATED ORAL
Qty: 90 TABLET | Refills: 1 | Status: SHIPPED | OUTPATIENT
Start: 2022-05-05 | End: 2022-08-03
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
## 2022-05-18 ENCOUNTER — TELEPHONE (OUTPATIENT)
Dept: PSYCHIATRY | Facility: CLINIC | Age: 52
End: 2022-05-18

## 2022-05-18 NOTE — TELEPHONE ENCOUNTER
Pt lvm stating that she talked to Zulma Callaway that they spoke and agreed they wanted to move tomorrows appointment to a month from now   If you can call pt and set that up with her thanks

## 2022-06-22 ENCOUNTER — OFFICE VISIT (OUTPATIENT)
Dept: PSYCHIATRY | Facility: CLINIC | Age: 52
End: 2022-06-22

## 2022-06-22 DIAGNOSIS — F41.1 GAD (GENERALIZED ANXIETY DISORDER): ICD-10-CM

## 2022-06-22 DIAGNOSIS — F33.0 MDD (MAJOR DEPRESSIVE DISORDER), RECURRENT EPISODE, MILD (HCC): Primary | ICD-10-CM

## 2022-06-22 DIAGNOSIS — F33.1 MAJOR DEPRESSIVE DISORDER, RECURRENT EPISODE, MODERATE (HCC): ICD-10-CM

## 2022-06-22 DIAGNOSIS — Z72.0 TOBACCO ABUSE: ICD-10-CM

## 2022-06-22 RX ORDER — SERTRALINE HYDROCHLORIDE 100 MG/1
100 TABLET, FILM COATED ORAL DAILY
Qty: 90 TABLET | Refills: 0 | Status: SHIPPED | OUTPATIENT
Start: 2022-06-22 | End: 2022-09-20

## 2022-06-22 RX ORDER — HYDROXYZINE HYDROCHLORIDE 25 MG/1
25 TABLET, FILM COATED ORAL 3 TIMES DAILY
Qty: 270 TABLET | Refills: 0 | Status: SHIPPED | OUTPATIENT
Start: 2022-06-22 | End: 2022-07-11 | Stop reason: SDUPTHER

## 2022-06-22 RX ORDER — SERTRALINE HYDROCHLORIDE 25 MG/1
25 TABLET, FILM COATED ORAL DAILY
Qty: 90 TABLET | Refills: 0 | Status: SHIPPED | OUTPATIENT
Start: 2022-06-22 | End: 2022-09-20
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
## 2022-06-27 ENCOUNTER — TELEPHONE (OUTPATIENT)
Dept: HEMATOLOGY ONCOLOGY | Facility: CLINIC | Age: 52
End: 2022-06-27

## 2022-07-11 DIAGNOSIS — F41.1 GAD (GENERALIZED ANXIETY DISORDER): ICD-10-CM

## 2022-07-11 RX ORDER — HYDROXYZINE HYDROCHLORIDE 25 MG/1
25 TABLET, FILM COATED ORAL 3 TIMES DAILY
Qty: 270 TABLET | Refills: 0 | Status: SHIPPED | OUTPATIENT
Start: 2022-07-11 | End: 2022-10-05 | Stop reason: SDUPTHER

## 2022-07-14 DIAGNOSIS — J43.8 OTHER EMPHYSEMA (HCC): Primary | ICD-10-CM

## 2022-07-14 RX ORDER — FLUTICASONE PROPIONATE AND SALMETEROL 113; 14 UG/1; UG/1
1 POWDER, METERED RESPIRATORY (INHALATION) 2 TIMES DAILY
Qty: 3 EACH | Refills: 3 | Status: SHIPPED | OUTPATIENT
Start: 2022-07-14 | End: 2022-12-28 | Stop reason: ALTCHOICE

## 2022-07-21 ENCOUNTER — TELEPHONE (OUTPATIENT)
Dept: BEHAVIORAL/MENTAL HEALTH CLINIC | Facility: CLINIC | Age: 52
End: 2022-07-21

## 2022-08-24 ENCOUNTER — TELEPHONE (OUTPATIENT)
Dept: PSYCHIATRY | Facility: CLINIC | Age: 52
End: 2022-08-24

## 2022-08-24 NOTE — TELEPHONE ENCOUNTER
Writer called to cx the virtual appt with Kareen Abdi on 8/24/22 due to the provider could not connect virtually,There are no follow-up scheduled at this time, thank you

## 2022-08-25 ENCOUNTER — TELEPHONE (OUTPATIENT)
Dept: BEHAVIORAL/MENTAL HEALTH CLINIC | Facility: CLINIC | Age: 52
End: 2022-08-25

## 2022-08-31 ENCOUNTER — TELEMEDICINE (OUTPATIENT)
Dept: BEHAVIORAL/MENTAL HEALTH CLINIC | Facility: CLINIC | Age: 52
End: 2022-08-31

## 2022-08-31 DIAGNOSIS — F33.0 MDD (MAJOR DEPRESSIVE DISORDER), RECURRENT EPISODE, MILD (HCC): Primary | ICD-10-CM

## 2022-08-31 DIAGNOSIS — F41.0 PANIC ATTACKS: ICD-10-CM

## 2022-08-31 DIAGNOSIS — F41.1 GAD (GENERALIZED ANXIETY DISORDER): ICD-10-CM

## 2022-08-31 DIAGNOSIS — F43.10 PTSD (POST-TRAUMATIC STRESS DISORDER): ICD-10-CM
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
## 2022-09-01 NOTE — PSYCH
Treatment Plan Tracking    # 2 Treatment Plan not completed within required time limits due to: Treatment Plan done but not signed at time of office visit due to:  Plan reviewed by phone or in person  and verbal consent given due to Emmy social emerald   Late due to clinician having to cancel sessions
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
## 2022-10-05 ENCOUNTER — OFFICE VISIT (OUTPATIENT)
Dept: PSYCHIATRY | Facility: CLINIC | Age: 52
End: 2022-10-05
Payer: COMMERCIAL

## 2022-10-05 DIAGNOSIS — F41.1 GAD (GENERALIZED ANXIETY DISORDER): ICD-10-CM

## 2022-10-05 DIAGNOSIS — Z72.0 TOBACCO ABUSE: ICD-10-CM

## 2022-10-05 DIAGNOSIS — F33.1 MAJOR DEPRESSIVE DISORDER, RECURRENT EPISODE, MODERATE (HCC): ICD-10-CM

## 2022-10-05 DIAGNOSIS — F33.0 MDD (MAJOR DEPRESSIVE DISORDER), RECURRENT EPISODE, MILD (HCC): Primary | ICD-10-CM

## 2022-10-05 PROCEDURE — 99214 OFFICE O/P EST MOD 30 MIN: CPT | Performed by: PSYCHIATRY & NEUROLOGY

## 2022-10-05 RX ORDER — QUETIAPINE FUMARATE 25 MG/1
25 TABLET, FILM COATED ORAL
Qty: 90 TABLET | Refills: 1 | Status: SHIPPED | OUTPATIENT
Start: 2022-10-05 | End: 2023-01-03

## 2022-10-05 RX ORDER — HYDROXYZINE HYDROCHLORIDE 25 MG/1
25 TABLET, FILM COATED ORAL 3 TIMES DAILY
Qty: 270 TABLET | Refills: 0 | Status: SHIPPED | OUTPATIENT
Start: 2022-10-05 | End: 2023-01-03

## 2022-10-05 RX ORDER — SERTRALINE HYDROCHLORIDE 100 MG/1
100 TABLET, FILM COATED ORAL DAILY
Qty: 90 TABLET | Refills: 1 | Status: SHIPPED | OUTPATIENT
Start: 2022-10-05 | End: 2023-01-03

## 2022-10-05 RX ORDER — SERTRALINE HYDROCHLORIDE 25 MG/1
25 TABLET, FILM COATED ORAL DAILY
Qty: 90 TABLET | Refills: 1 | Status: SHIPPED | OUTPATIENT
Start: 2022-10-05 | End: 2023-01-03
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
## 2022-10-11 ENCOUNTER — TELEMEDICINE (OUTPATIENT)
Dept: BEHAVIORAL/MENTAL HEALTH CLINIC | Facility: CLINIC | Age: 52
End: 2022-10-11

## 2022-10-11 DIAGNOSIS — F41.0 PANIC ATTACKS: ICD-10-CM

## 2022-10-11 DIAGNOSIS — F43.10 PTSD (POST-TRAUMATIC STRESS DISORDER): ICD-10-CM

## 2022-10-11 DIAGNOSIS — F33.0 MDD (MAJOR DEPRESSIVE DISORDER), RECURRENT EPISODE, MILD (HCC): Primary | ICD-10-CM

## 2022-10-11 DIAGNOSIS — F41.1 GAD (GENERALIZED ANXIETY DISORDER): ICD-10-CM
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
## 2022-11-08 ENCOUNTER — TELEMEDICINE (OUTPATIENT)
Dept: BEHAVIORAL/MENTAL HEALTH CLINIC | Facility: CLINIC | Age: 52
End: 2022-11-08

## 2022-11-08 DIAGNOSIS — F33.0 MDD (MAJOR DEPRESSIVE DISORDER), RECURRENT EPISODE, MILD (HCC): Primary | ICD-10-CM

## 2022-11-08 DIAGNOSIS — F41.0 PANIC ATTACKS: ICD-10-CM

## 2022-11-08 DIAGNOSIS — F43.10 PTSD (POST-TRAUMATIC STRESS DISORDER): ICD-10-CM

## 2022-11-08 DIAGNOSIS — F41.1 GAD (GENERALIZED ANXIETY DISORDER): ICD-10-CM
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
## 2022-12-11 ENCOUNTER — APPOINTMENT (OUTPATIENT)
Dept: RADIOLOGY | Facility: HOSPITAL | Age: 52
End: 2022-12-11

## 2022-12-11 ENCOUNTER — APPOINTMENT (EMERGENCY)
Dept: RADIOLOGY | Facility: HOSPITAL | Age: 52
End: 2022-12-11

## 2022-12-11 ENCOUNTER — HOSPITAL ENCOUNTER (EMERGENCY)
Facility: HOSPITAL | Age: 52
Discharge: HOME | End: 2022-12-11
Attending: EMERGENCY MEDICINE

## 2022-12-11 VITALS
OXYGEN SATURATION: 98 % | TEMPERATURE: 97.8 F | WEIGHT: 170 LBS | RESPIRATION RATE: 16 BRPM | HEIGHT: 63 IN | HEART RATE: 69 BPM | DIASTOLIC BLOOD PRESSURE: 74 MMHG | SYSTOLIC BLOOD PRESSURE: 116 MMHG | BODY MASS INDEX: 30.12 KG/M2

## 2022-12-11 DIAGNOSIS — R07.89 ATYPICAL CHEST PAIN: Primary | ICD-10-CM

## 2022-12-11 DIAGNOSIS — R00.2 PALPITATIONS: ICD-10-CM

## 2022-12-11 LAB
ALBUMIN SERPL BCP-MCNC: 3.9 G/DL (ref 3.5–5)
ALP SERPL-CCNC: 84 U/L (ref 46–116)
ALT SERPL W P-5'-P-CCNC: 22 U/L (ref 12–78)
ANION GAP SERPL CALCULATED.3IONS-SCNC: 10 MMOL/L (ref 4–13)
AST SERPL W P-5'-P-CCNC: 13 U/L (ref 5–45)
BASOPHILS # BLD AUTO: 0.05 THOUSANDS/ÂΜL (ref 0–0.1)
BASOPHILS NFR BLD AUTO: 1 % (ref 0–1)
BILIRUB SERPL-MCNC: 0.2 MG/DL (ref 0.2–1)
BUN SERPL-MCNC: 17 MG/DL (ref 5–25)
CALCIUM SERPL-MCNC: 9.7 MG/DL (ref 8.3–10.1)
CARDIAC TROPONIN I PNL SERPL HS: <2 NG/L
CHLORIDE SERPL-SCNC: 104 MMOL/L (ref 96–108)
CO2 SERPL-SCNC: 28 MMOL/L (ref 21–32)
CREAT SERPL-MCNC: 0.9 MG/DL (ref 0.6–1.3)
EOSINOPHIL # BLD AUTO: 0.16 THOUSAND/ÂΜL (ref 0–0.61)
EOSINOPHIL NFR BLD AUTO: 2 % (ref 0–6)
ERYTHROCYTE [DISTWIDTH] IN BLOOD BY AUTOMATED COUNT: 12.9 % (ref 11.6–15.1)
GFR SERPL CREATININE-BSD FRML MDRD: 73 ML/MIN/1.73SQ M
GLUCOSE SERPL-MCNC: 102 MG/DL (ref 65–140)
HCT VFR BLD AUTO: 42.1 % (ref 34.8–46.1)
HGB BLD-MCNC: 14.6 G/DL (ref 11.5–15.4)
IMM GRANULOCYTES # BLD AUTO: 0.01 THOUSAND/UL (ref 0–0.2)
IMM GRANULOCYTES NFR BLD AUTO: 0 % (ref 0–2)
LYMPHOCYTES # BLD AUTO: 5.69 THOUSANDS/ÂΜL (ref 0.6–4.47)
LYMPHOCYTES NFR BLD AUTO: 52 % (ref 14–44)
MCH RBC QN AUTO: 31.9 PG (ref 26.8–34.3)
MCHC RBC AUTO-ENTMCNC: 34.7 G/DL (ref 31.4–37.4)
MCV RBC AUTO: 92 FL (ref 82–98)
MONOCYTES # BLD AUTO: 0.67 THOUSAND/ÂΜL (ref 0.17–1.22)
MONOCYTES NFR BLD AUTO: 6 % (ref 4–12)
NEUTROPHILS # BLD AUTO: 4.14 THOUSANDS/ÂΜL (ref 1.85–7.62)
NEUTS SEG NFR BLD AUTO: 39 % (ref 43–75)
NRBC BLD AUTO-RTO: 0 /100 WBCS
PLATELET # BLD AUTO: 375 THOUSANDS/UL (ref 149–390)
PMV BLD AUTO: 9.3 FL (ref 8.9–12.7)
POTASSIUM SERPL-SCNC: 3.6 MMOL/L (ref 3.5–5.3)
PROT SERPL-MCNC: 7.5 G/DL (ref 6.4–8.4)
RBC # BLD AUTO: 4.58 MILLION/UL (ref 3.81–5.12)
SODIUM SERPL-SCNC: 142 MMOL/L (ref 135–147)
WBC # BLD AUTO: 10.72 THOUSAND/UL (ref 4.31–10.16)

## 2022-12-11 RX ORDER — SODIUM CHLORIDE 9 MG/ML
3 INJECTION INTRAVENOUS
Status: DISCONTINUED | OUTPATIENT
Start: 2022-12-11 | End: 2022-12-11 | Stop reason: HOSPADM

## 2022-12-12 LAB
ATRIAL RATE: 77 BPM
P AXIS: 74 DEGREES
PR INTERVAL: 148 MS
QRS AXIS: 58 DEGREES
QRSD INTERVAL: 88 MS
QT INTERVAL: 384 MS
QTC INTERVAL: 434 MS
T WAVE AXIS: 62 DEGREES
VENTRICULAR RATE: 77 BPM

## 2022-12-12 NOTE — DISCHARGE INSTRUCTIONS
Keep well-hydrated  Try to get enough sleep  Decrease tobacco and caffeine use  Limit alcohol use  Continue present medications  Please contact your PCP in the morning for follow-up  Consider scheduling appointment with your pulmonologist as well for your symptoms

## 2022-12-12 NOTE — ED PROVIDER NOTES
History  Chief Complaint   Patient presents with   • Chest Pain     Patient complaint of chest pain and chest palpitations x 2 months       46 yo F with COPD, PTSD, MDD c/o intermittent chest pain and rare palpitations for 2 months  Denies change in cough and recent fever, nausea, diaphoresis, hemoptysis, swelling or pain of extremity, surgery, immobilization and syncope  No FHX of early CAD or thrombophilia  Prior to Admission Medications   Prescriptions Last Dose Informant Patient Reported? Taking?    B Complex-Folic Acid (SUPER B COMPLEX MAXI) TABS   Yes No   Sig: Take by mouth   Cholecalciferol (VITAMIN D3) 1000 units CAPS   Yes No   Sig: Take 2 capsules by mouth daily   Magnesium 400 MG CAPS   Yes No   Sig: Take 400 mg by mouth daily    Multiple Vitamin (MULTI-VITAMIN DAILY PO)   Yes No   Sig: Take 1 tablet by mouth daily   Omega-3 Fatty Acids (OMEGA-3 FISH OIL PO)   Yes No   Sig: Take by mouth 1 daily  (-er pt---3/6/9)   QUEtiapine (SEROquel) 25 mg tablet   No No   Sig: Take 1 tablet (25 mg total) by mouth daily at bedtime   albuterol (PROAIR HFA) 90 mcg/act inhaler   No No   Sig: Inhale 2 puffs every 6 (six) hours as needed for wheezing or shortness of breath   estradiol (ESTRACE) 0 1 mg/g vaginal cream   No No   Sig: Insert 1 g into the vagina 2 (two) times a week   fluticasone-salmeterol (AirDuo RespiClick 007/53) 175-85 mcg/act dry powder inhaler   No No   Sig: Inhale 1 puff 2 (two) times a day Rinse mouth after use    hydrOXYzine HCL (ATARAX) 25 mg tablet   No No   Sig: Take 1 tablet (25 mg total) by mouth 3 (three) times a day   montelukast (SINGULAIR) 10 mg tablet   No No   Sig: Take 1 tablet (10 mg total) by mouth daily at bedtime   sertraline (ZOLOFT) 100 mg tablet   No No   Sig: Take 1 tablet (100 mg total) by mouth daily   sertraline (Zoloft) 25 mg tablet   No No   Sig: Take 1 tablet (25 mg total) by mouth daily      Facility-Administered Medications: None       Past Medical History: Diagnosis Date   • COPD (chronic obstructive pulmonary disease) (HCC)    • Dysmenorrhea    • Emphysema of lung (HCC)    • IBS (irritable bowel syndrome)    • Liver lesion 1/9/2018   • MDD (major depressive disorder), recurrent episode, moderate ([facility] ) 2/14/2018   • Menopausal symptoms    • Other emphysema ([facility] ) 2/23/2018   • PTSD (post-traumatic stress disorder) 2/14/2018   • Skin disorder    • Tick bite        Past Surgical History:   Procedure Laterality Date   • CHOLECYSTECTOMY     • KNEE SURGERY     • MICRODISCECTOMY LUMBAR  2003   • TONSILLECTOMY  1986       Family History   Problem Relation Age of Onset   • Bipolar disorder Mother    • Diverticulitis Mother    • Clotting disorder Mother         coagulation disorder; after hernia surg and had multiorgan failure- at [facility] age 72   • Mental illness Mother         suicidal ideation   • OCD Sister    • Bipolar disorder Brother         affective   • Substance Abuse Brother    • Suicide Attempts Brother    • COPD Maternal Grandfather    • Skin cancer Father    • No Known Problems Maternal Grandmother    • No Known Problems Paternal Grandmother    • Prostate cancer Paternal Grandfather    • No Known Problems Maternal Aunt    • No Known Problems Paternal Aunt    • Cancer Paternal Aunt         unknown primary   • Liver cancer Cousin      I have reviewed and agree with the history as documented  E-Cigarette/Vaping   • E-Cigarette Use Never User      E-Cigarette/Vaping Substances   • Nicotine No    • THC No    • CBD No    • Flavoring No    • Other No    • Unknown No      Social History     Tobacco Use   • Smoking status: Every Day     Packs/day: 0 50     Years: 30 00     Pack years: 15 00     Types: Cigarettes   • Smokeless tobacco: Never   Vaping Use   • Vaping Use: Never used   Substance Use Topics   • Alcohol use: No   • Drug use: No       Review of Systems   Constitutional: Negative for fever  HENT: Negative for congestion and sore throat  Respiratory: Positive for cough and shortness of breath  Cardiovascular: Positive for chest pain and palpitations  Negative for leg swelling  Gastrointestinal: Negative for abdominal pain, diarrhea and vomiting  All other systems reviewed and are negative  Physical Exam  Physical Exam  Vitals and nursing note reviewed  Constitutional:       General: She is not in acute distress  Appearance: She is well-developed  She is not ill-appearing or diaphoretic  HENT:      Head: Normocephalic and atraumatic  Eyes:      Conjunctiva/sclera: Conjunctivae normal       Pupils: Pupils are equal, round, and reactive to light  Neck:      Vascular: No JVD  Cardiovascular:      Rate and Rhythm: Normal rate and regular rhythm  Pulses:           Radial pulses are 3+ on the right side and 3+ on the left side  Dorsalis pedis pulses are 1+ on the right side and 1+ on the left side  Heart sounds: Normal heart sounds  No murmur heard  Pulmonary:      Effort: Pulmonary effort is normal       Breath sounds: Normal breath sounds  Chest:      Chest wall: No tenderness  Abdominal:      General: Bowel sounds are normal  There is no abdominal bruit  Palpations: Abdomen is soft  There is no fluid wave  Tenderness: There is no abdominal tenderness  There is no guarding or rebound  Musculoskeletal:         General: Normal range of motion  Cervical back: Normal range of motion and neck supple  Right lower leg: No tenderness  No edema  Left lower leg: No tenderness  No edema  Lymphadenopathy:      Cervical: No cervical adenopathy  Skin:     General: Skin is warm and dry  Capillary Refill: Capillary refill takes less than 2 seconds  Findings: No rash  Neurological:      General: No focal deficit present  Mental Status: She is alert and oriented to person, place, and time  Cranial Nerves: No cranial nerve deficit  Motor: No weakness  Coordination: Coordination normal       Deep Tendon Reflexes: Reflexes are normal and symmetric  Psychiatric:         Mood and Affect: Mood is anxious           Behavior: Behavior normal          Vital Signs  ED Triage Vitals [12/11/22 1846]   Temperature Pulse Respirations Blood Pressure SpO2   97 8 °F (36 6 °C) 86 18 (!) 158/106 98 %      Temp src Heart Rate Source Patient Position - Orthostatic VS BP Location FiO2 (%)   -- -- -- -- --      Pain Score       --           Vitals:    12/11/22 1917 12/11/22 1930 12/11/22 2000 12/11/22 2015   BP: 135/91 116/76 127/74 116/74   Pulse: 88 72 71 69         Visual Acuity      ED Medications  Medications - No data to display    Diagnostic Studies  Results Reviewed     Procedure Component Value Units Date/Time    HS Troponin 0hr (reflex protocol) [[id number]]  (Normal) Collected: 12/11/22 1852    Lab Status: Final result Specimen: Blood from Arm, Left Updated: 12/11/22 1922     hs TnI 0hr <2 ng/L     Comprehensive metabolic panel [[id number]] Collected: 12/11/22 1852    Lab Status: Final result Specimen: Blood from Arm, Left Updated: 12/11/22 1916     Sodium 142 mmol/L      Potassium 3 6 mmol/L      Chloride 104 mmol/L      CO2 28 mmol/L      ANION GAP 10 mmol/L      BUN 17 mg/dL      Creatinine 0 90 mg/dL      Glucose 102 mg/dL      Calcium 9 7 mg/dL      AST 13 U/L      ALT 22 U/L      Alkaline Phosphatase 84 U/L      Total Protein 7 5 g/dL      Albumin 3 9 g/dL      Total Bilirubin 0 20 mg/dL      eGFR 73 ml/min/1 73sq m     Narrative:      Pravin guidelines for Chronic Kidney Disease (CKD):   •  Stage 1 with normal or high GFR (GFR > 90 mL/min/1 73 square meters)  •  Stage 2 Mild CKD (GFR = 60-89 mL/min/1 73 square meters)  •  Stage 3A Moderate CKD (GFR = 45-59 mL/min/1 73 square meters)  •  Stage 3B Moderate CKD (GFR = 30-44 mL/min/1 73 square meters)  •  Stage 4 Severe CKD (GFR = 15-29 mL/min/1 73 square meters)  •  Stage 5 End Stage CKD (GFR <15 mL/min/1 73 square meters)  Note: GFR calculation is accurate only with a steady state creatinine    CBC and differential [[id number]]  (Abnormal) Collected: 12/11/22 1852    Lab Status: Final result Specimen: Blood from Arm, Left Updated: 12/11/22 1900     WBC 10 72 Thousand/uL      RBC 4 58 Million/uL      Hemoglobin 14 6 g/dL      Hematocrit 42 1 %      MCV 92 fL      MCH 31 9 pg      MCHC 34 7 g/dL      RDW 12 9 %      MPV 9 3 fL      Platelets 525 Thousands/uL      nRBC 0 /100 WBCs      Neutrophils Relative 39 %      Immat GRANS % 0 %      Lymphocytes Relative 52 %      Monocytes Relative 6 %      Eosinophils Relative 2 %      Basophils Relative 1 %      Neutrophils Absolute 4 14 Thousands/µL      Immature Grans Absolute 0 01 Thousand/uL      Lymphocytes Absolute 5 69 Thousands/µL      Monocytes Absolute 0 67 Thousand/µL      Eosinophils Absolute 0 16 Thousand/µL      Basophils Absolute 0 05 Thousands/µL                  XR chest pa & lateral   ED Interpretation by Esme Raza DO (12/11 2012)   No infiltrate, effusion, pneumothorax or CHF      Final Result by Alex Nazario MD (12/12 3949)      No acute cardiopulmonary disease  Findings are stable            Workstation performed: [id number]                    Procedures  ECG 12 Lead Documentation Only    Date/Time: 12/11/2022 7:10 PM  Performed by: Esme Raza DO  Authorized by: Esme Raza DO     ECG reviewed by me, the ED Provider: yes    Patient location:  ED  Previous ECG:     Previous ECG:  Unavailable    Comparison to cardiac monitor: Yes    Interpretation:     Interpretation: normal               ED Course  ED Course as of 12/14/22 0855   Sun Dec 11, 2022   2019 Patient is asymptomatic  Vital signs are stable  I discussed results and instructed patient follow-up with her PCP and her pulmonologist   She may need to wear a Holter or get additional cardiac studies done as outpatient               HEART Risk Score Flowsheet Row Most Recent Value   Heart Score Risk Calculator    History 0 Filed at: 12/11/2022 2013   ECG 0 Filed at: 12/11/2022 2013   Age 1 Filed at: 12/11/2022 2013   Risk Factors 0 Filed at: 12/11/2022 2013   Troponin 0 Filed at: 12/11/2022 2013   HEART Score 1 Filed at: 12/11/2022 2013                        SBIRT 22yo+    Flowsheet Row Most Recent Value   SBIRT (23 yo +)    In order to provide better care to our patients, we are screening all of our patients for alcohol and drug use  Would it be okay to ask you these screening questions? Yes Filed at: 12/11/2022 2000   Initial Alcohol Screen: US AUDIT-C     1  How often do you have a drink containing alcohol? 0 Filed at: 12/11/2022 2000   2  How many drinks containing alcohol do you have on a typical day you are drinking? 0 Filed at: 12/11/2022 2000   3a  Male UNDER 65: How often do you have five or more drinks on one occasion? 0 Filed at: 12/11/2022 2000   3b  FEMALE Any Age, or MALE 65+: How often do you have 4 or more drinks on one occassion? 0 Filed at: 12/11/2022 2000   Audit-C Score 0 Filed at: 12/11/2022 2000   BRANDON: How many times in the past year have you    Used an illegal drug or used a prescription medication for non-medical reasons? Never Filed at: 12/11/2022 2000                    MDM  Number of Diagnoses or Management Options  Atypical chest pain: established and worsening  Palpitations: established and worsening  Diagnosis management comments: Atypical CP, intermittent palpitations without syncope in otherwise stable middle aged female  DDx: dysrhythmia such as PVC's, PSVT, PAF, ACS, anxiety, LI with electrolyte abnormality  Remains stable here labs and HEART score reassuring    Will instruct f/u with PCP       Amount and/or Complexity of Data Reviewed  Clinical lab tests: ordered and reviewed  Tests in the radiology section of CPT®: ordered and reviewed  Review and summarize past medical records: yes  Independent visualization of images, tracings, or specimens: yes        Disposition  Final diagnoses:   Atypical chest pain   Palpitations     Time reflects when diagnosis was documented in both MDM as applicable and the Disposition within this note     Time User Action Codes Description Comment    12/11/2022  8:19 PM Lafroberto Im Add [R07 89] Atypical chest pain     12/11/2022  8:19 PM Lafroberto Im Add [R00 2] Palpitations       ED Disposition     ED Disposition   Discharge    Condition   Stable    Date/Time   Sun Dec 11, 2022  8:19 PM    [address] Daniela Almaguer discharge to home/self care                 Follow-up Information     Follow up With Specialties Details Why Rojeliodemetrius Kavon Garrido [address], DO Internal Medicine Call in 1 day For follow-up [facility]  [address] [phone]            Discharge Medication List as of 12/11/2022  8:21 PM      CONTINUE these medications which have NOT CHANGED    Details   albuterol (PROAIR HFA) 90 mcg/act inhaler Inhale 2 puffs every 6 (six) hours as needed for wheezing or shortness of breath, Starting Fri 2/23/2018, Normal      B Complex-Folic Acid (SUPER B COMPLEX MAXI) TABS Take by mouth, Historical Med      Cholecalciferol (VITAMIN D3) 1000 units CAPS Take 2 capsules by mouth daily, Starting Fri 12/4/2015, Historical Med      estradiol (ESTRACE) 0 1 mg/g vaginal cream Insert 1 g into the vagina 2 (two) times a week, Starting Thu 4/21/2022, Print      fluticasone-salmeterol (AirDuo RespiClick 206/46) 264-56 mcg/act dry powder inhaler Inhale 1 puff 2 (two) times a day Rinse mouth after use , Starting Thu 7/14/2022, Normal      hydrOXYzine HCL (ATARAX) 25 mg tablet Take 1 tablet (25 mg total) by mouth 3 (three) times a day, Starting Wed 10/5/2022, Until Tue 1/3/2023, Normal      Magnesium 400 MG CAPS Take 400 mg by mouth daily , Historical Med      montelukast (SINGULAIR) 10 mg tablet Take 1 tablet (10 mg total) by mouth daily at bedtime, Starting Fri 10/22/2021, Print      Multiple Vitamin (MULTI-VITAMIN DAILY PO) Take 1 tablet by mouth daily, Starting Fri 12/4/2015, Historical Med      Omega-3 Fatty Acids (OMEGA-3 FISH OIL PO) Take by mouth 1 daily  (-er pt---3/6/9), Historical Med      QUEtiapine (SEROquel) 25 mg tablet Take 1 tablet (25 mg total) by mouth daily at bedtime, Starting Wed 10/5/2022, Until Tue 1/3/2023, Normal      !! sertraline (ZOLOFT) 100 mg tablet Take 1 tablet (100 mg total) by mouth daily, Starting Wed 10/5/2022, Until Tue 1/3/2023, Normal      !! sertraline (Zoloft) 25 mg tablet Take 1 tablet (25 mg total) by mouth daily, Starting Wed 10/5/2022, Until Tue 1/3/2023, Normal       !! - Potential duplicate medications found  Please discuss with provider  No discharge procedures on file      PDMP Review       Value Time User    PDMP Reviewed  Yes 2/9/2021  5:40 PM Kadeem Herman DO          ED Provider  Electronically Signed by           Esme Raza DO  12/14/22 2226

## 2022-12-15 ENCOUNTER — TELEPHONE (OUTPATIENT)
Dept: PSYCHIATRY | Facility: CLINIC | Age: 52
End: 2022-12-15

## 2022-12-15 NOTE — TELEPHONE ENCOUNTER
Called patient LVM stating office needs to r/s appt for 12/19  Provider is not in office on Monday's/  Left resident line phone number to call back to r/s this appt

## 2022-12-28 ENCOUNTER — OFFICE VISIT (OUTPATIENT)
Dept: FAMILY MEDICINE CLINIC | Facility: HOSPITAL | Age: 52
End: 2022-12-28

## 2022-12-28 ENCOUNTER — TELEPHONE (OUTPATIENT)
Dept: FAMILY MEDICINE CLINIC | Facility: HOSPITAL | Age: 52
End: 2022-12-28

## 2022-12-28 VITALS
BODY MASS INDEX: 29.95 KG/M2 | HEART RATE: 114 BPM | DIASTOLIC BLOOD PRESSURE: 78 MMHG | HEIGHT: 63 IN | OXYGEN SATURATION: 97 % | SYSTOLIC BLOOD PRESSURE: 126 MMHG | WEIGHT: 169 LBS

## 2022-12-28 DIAGNOSIS — F43.10 PTSD (POST-TRAUMATIC STRESS DISORDER): ICD-10-CM

## 2022-12-28 DIAGNOSIS — R53.83 OTHER FATIGUE: ICD-10-CM

## 2022-12-28 DIAGNOSIS — R07.9 LEFT-SIDED CHEST PAIN: ICD-10-CM

## 2022-12-28 DIAGNOSIS — G47.33 OSA (OBSTRUCTIVE SLEEP APNEA): ICD-10-CM

## 2022-12-28 DIAGNOSIS — J43.8 OTHER EMPHYSEMA (HCC): Primary | ICD-10-CM

## 2022-12-28 DIAGNOSIS — F41.1 GAD (GENERALIZED ANXIETY DISORDER): ICD-10-CM

## 2022-12-28 DIAGNOSIS — R00.2 PALPITATIONS: ICD-10-CM

## 2022-12-28 DIAGNOSIS — E66.3 OVERWEIGHT WITH BODY MASS INDEX (BMI) OF 29 TO 29.9 IN ADULT: ICD-10-CM

## 2022-12-28 DIAGNOSIS — F33.0 MDD (MAJOR DEPRESSIVE DISORDER), RECURRENT EPISODE, MILD (HCC): ICD-10-CM

## 2022-12-28 DIAGNOSIS — Z72.0 TOBACCO ABUSE: ICD-10-CM

## 2022-12-28 RX ORDER — FLUTICASONE PROPIONATE AND SALMETEROL 250; 50 UG/1; UG/1
1 POWDER RESPIRATORY (INHALATION) 2 TIMES DAILY
Qty: 180 BLISTER | Refills: 3 | Status: SHIPPED | OUTPATIENT
Start: 2022-12-28 | End: 2022-12-28 | Stop reason: ALTCHOICE

## 2022-12-28 RX ORDER — FLUTICASONE FUROATE AND VILANTEROL 200; 25 UG/1; UG/1
1 POWDER RESPIRATORY (INHALATION) DAILY
Qty: 60 BLISTER | Refills: 5 | Status: SHIPPED | OUTPATIENT
Start: 2022-12-28 | End: 2023-01-27

## 2022-12-28 NOTE — ASSESSMENT & PLAN NOTE
Doing better with sleep since on seroquel   using hydroxy zine prn for  Anxiety and rare panic attacks   has helpoed with concentration on job

## 2022-12-28 NOTE — ASSESSMENT & PLAN NOTE
Exhausted since switch from bro to airduo click- has been more exhausted  Now bringing up phlegm- not discolored- noo fevers but having chills  Chest xray was unremarkable

## 2022-12-28 NOTE — TELEPHONE ENCOUNTER
advair needs a prior authorization    Patient states Miguel Torres works for her  Insurance will cover this but it will also need a prior auth      Patient would rather get breo than try the advair since they both need the prior auth

## 2022-12-28 NOTE — PATIENT INSTRUCTIONS
Call to have holter and pft  Call for stress test   Call to see Andrei Samuels   Try on advair- if not effective- then we will try to get exempt ion for breo

## 2022-12-28 NOTE — TELEPHONE ENCOUNTER
Are you OK if I send in for the [facility] and just try 1 prior authorization?

## 2022-12-28 NOTE — ASSESSMENT & PLAN NOTE
Now about 15 cigarettes daily- lots of family stressors- gradually reduce from 10 per day and decrease every 2 weeks

## 2022-12-28 NOTE — PROGRESS NOTES
Assessment/Plan:     Diagnosis ICD-10-CM Associated Orders   1  Emphysema (HCC)  J43 8 Fluticasone-Salmeterol (Advair) 250-50 mcg/dose inhaler     Complete PFT with post bronchodilator     Ambulatory Referral to Pulmonology      2  DANIA (obstructive sleep apnea)  G47 33 Ambulatory Referral to Pulmonology      3  Other fatigue  R53 83       4  Palpitations  R00 2 Complete PFT with post bronchodilator     Holter monitor     Stress test only, exercise      5  PTSD (post-traumatic stress disorder)  F43 10       6  KIP (generalized anxiety disorder)  F41 1       7  Tobacco abuse  Z72 0       8  MDD (major depressive disorder), recurrent episode, mild (HCC)  F33 0       9  Overweight with body mass index (BMI) of 29 to 29 9 in adult  E66 3     Z68 29       10   Left-sided chest pain  R07 9 Stress test only, exercise          Problem List Items Addressed This Visit        Respiratory    Emphysema ([address] ) - Primary     Exhausted since switch from bro to airduo click- has been more exhausted  Now bringing up phlegm- not discolored- noo fevers but having chills  Chest xray was unremarkable         Relevant Medications    Fluticasone-Salmeterol (Advair) 250-50 mcg/dose inhaler    Other Relevant Orders    Complete PFT with post bronchodilator    Ambulatory Referral to Pulmonology    DANIA (obstructive sleep apnea)     Got a new joseph machine but has not used it yet         Relevant Orders    Ambulatory Referral to Pulmonology       Other    MDD (major depressive disorder), recurrent episode, mild (HCC)    PTSD (post-traumatic stress disorder)    Tobacco abuse     Now about 15 cigarettes daily- lots of family stressors- gradually reduce from 10 per day and decrease every 2 weeks         KIP (generalized anxiety disorder)     Doing better with sleep since on seroquel   using hydroxy zine prn for  Anxiety and rare panic attacks   has helpoed with concentration on job         Overweight with body mass index (BMI) of 29 to 29 9 in adult     Feeling very tired in past few months-         Other Visit Diagnoses     Other fatigue        Palpitations        Relevant Orders    Complete PFT with post bronchodilator    Holter monitor    Stress test only, exercise    Left-sided chest pain        Relevant Orders    Stress test only, exercise            Return in about 3 months (around 3/28/2023)  Subjective:    Patient ID: Ludy Oneil is a 47 y o  female    [place] mother  on hospice at age 79 in November- crying  At times  had ed visit  On Dec  15th with chest pain- was having some palpitations as well with some left sided chest pain- remotely had stress test but not in current epic system so likely prior to   Feels dizzy after taking deep breath      The following portions of the patient's history were reviewed and updated as appropriate: allergies, current medications and problem list      Review of Systems   HENT: Positive for congestion  Respiratory: Positive for cough  Gastrointestinal: Negative for abdominal distention  All other systems reviewed and are negative  BMI Counseling: Body mass index is 29 94 kg/m²  The BMI is above normal  Nutrition recommendations include 3-5 servings of fruits/vegetables daily and consuming healthier snacks        Objective:      Current Outpatient Medications:   •  albuterol (PROAIR HFA) 90 mcg/act inhaler, Inhale 2 puffs every 6 (six) hours as needed for wheezing or shortness of breath, Disp: 1 Inhaler, Rfl: 6  •  B Complex-Folic Acid (SUPER B COMPLEX MAXI) TABS, Take by mouth, Disp: , Rfl:   •  Cholecalciferol (VITAMIN D3) 1000 units CAPS, Take 2 capsules by mouth daily, Disp: , Rfl:   •  Fluticasone-Salmeterol (Advair) 250-50 mcg/dose inhaler, Inhale 1 puff 2 (two) times a day Rinse mouth after use , Disp: 180 blister, Rfl: 3  •  fluticasone-salmeterol (AirDuo RespiClick 834/32) 590-49 mcg/act dry powder inhaler, Inhale 1 puff 2 (two) times a day Rinse mouth after use , Disp: 3 each, Rfl: 3  •  hydrOXYzine HCL (ATARAX) 25 mg tablet, Take 1 tablet (25 mg total) by mouth 3 (three) times a day, Disp: 270 tablet, Rfl: 0  •  Magnesium 400 MG CAPS, Take 400 mg by mouth daily , Disp: , Rfl:   •  Multiple Vitamin (MULTI-VITAMIN DAILY PO), Take 1 tablet by mouth daily, Disp: , Rfl:   •  Omega-3 Fatty Acids (OMEGA-3 FISH OIL PO), Take by mouth 1 daily  (-er pt---3/6/9), Disp: , Rfl:   •  QUEtiapine (SEROquel) 25 mg tablet, Take 1 tablet (25 mg total) by mouth daily at bedtime, Disp: 90 tablet, Rfl: 1  •  sertraline (ZOLOFT) 100 mg tablet, Take 1 tablet (100 mg total) by mouth daily, Disp: 90 tablet, Rfl: 1  •  sertraline (Zoloft) 25 mg tablet, Take 1 tablet (25 mg total) by mouth daily, Disp: 90 tablet, Rfl: 1    Blood pressure 126/78, pulse (!) 114, height 5' 3" (1 6 m), weight 76 7 kg (169 lb), SpO2 97 %, not currently breastfeeding  Physical Exam  Vitals and nursing note reviewed  HENT:      Head: Normocephalic and atraumatic  Right Ear: Tympanic membrane normal       Left Ear: Tympanic membrane normal       Nose: No congestion  Mouth/Throat:      Pharynx: No oropharyngeal exudate  Eyes:      General: No scleral icterus  Right eye: No discharge  Left eye: No discharge  Cardiovascular:      Rate and Rhythm: Normal rate and regular rhythm  Heart sounds: No murmur heard  Pulmonary:      Effort: Pulmonary effort is normal       Breath sounds: No wheezing or rhonchi  Abdominal:      General: There is no distension  Palpations: Abdomen is soft  Tenderness: There is no abdominal tenderness  Musculoskeletal:         General: No swelling or tenderness  Cervical back: No rigidity  Neurological:      General: No focal deficit present  Mental Status: She is alert  Psychiatric:         Mood and Affect: Mood normal          Thought Content:  Thought content normal          Judgment: Judgment normal

## 2022-12-29 ENCOUNTER — TELEPHONE (OUTPATIENT)
Dept: PSYCHIATRY | Facility: CLINIC | Age: 52
End: 2022-12-29

## 2022-12-29 ENCOUNTER — TELEMEDICINE (OUTPATIENT)
Dept: PSYCHIATRY | Facility: CLINIC | Age: 52
End: 2022-12-29

## 2022-12-29 DIAGNOSIS — F33.1 MAJOR DEPRESSIVE DISORDER, RECURRENT EPISODE, MODERATE (HCC): ICD-10-CM

## 2022-12-29 DIAGNOSIS — F41.1 GAD (GENERALIZED ANXIETY DISORDER): ICD-10-CM

## 2022-12-29 DIAGNOSIS — F33.0 MDD (MAJOR DEPRESSIVE DISORDER), RECURRENT EPISODE, MILD (HCC): Primary | ICD-10-CM

## 2022-12-29 DIAGNOSIS — Z72.0 TOBACCO ABUSE: ICD-10-CM

## 2022-12-29 RX ORDER — QUETIAPINE FUMARATE 25 MG/1
25 TABLET, FILM COATED ORAL
Qty: 90 TABLET | Refills: 1 | Status: SHIPPED | OUTPATIENT
Start: 2022-12-29 | End: 2023-03-29

## 2022-12-29 RX ORDER — SERTRALINE HYDROCHLORIDE 100 MG/1
100 TABLET, FILM COATED ORAL DAILY
Qty: 90 TABLET | Refills: 1 | Status: SHIPPED | OUTPATIENT
Start: 2022-12-29 | End: 2023-03-29

## 2022-12-29 RX ORDER — HYDROXYZINE HYDROCHLORIDE 25 MG/1
25 TABLET, FILM COATED ORAL 3 TIMES DAILY
Qty: 270 TABLET | Refills: 0 | Status: SHIPPED | OUTPATIENT
Start: 2022-12-29 | End: 2023-03-29

## 2022-12-29 RX ORDER — SERTRALINE HYDROCHLORIDE 25 MG/1
25 TABLET, FILM COATED ORAL DAILY
Qty: 90 TABLET | Refills: 1 | Status: SHIPPED | OUTPATIENT
Start: 2022-12-29 | End: 2023-03-29

## 2022-12-29 NOTE — TELEPHONE ENCOUNTER
Awaiting updating of Dr Rubin Medal schedule to set 3-month follow up appointment  Patient was last seen, virtually, on 12/29/22
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
## 2023-01-05 DIAGNOSIS — J43.8 OTHER EMPHYSEMA (HCC): Primary | ICD-10-CM

## 2023-01-05 RX ORDER — BUDESONIDE AND FORMOTEROL FUMARATE DIHYDRATE 160; 4.5 UG/1; UG/1
2 AEROSOL RESPIRATORY (INHALATION) 2 TIMES DAILY
Qty: 10.2 G | Refills: 1 | Status: SHIPPED | OUTPATIENT
Start: 2023-01-05

## 2023-01-06 ENCOUNTER — HOSPITAL ENCOUNTER (OUTPATIENT)
Dept: NON INVASIVE DIAGNOSTICS | Age: 53
Discharge: HOME | End: 2023-01-06

## 2023-01-06 DIAGNOSIS — R00.2 PALPITATIONS: ICD-10-CM

## 2023-01-10 ENCOUNTER — TELEMEDICINE (OUTPATIENT)
Dept: BEHAVIORAL/MENTAL HEALTH CLINIC | Facility: CLINIC | Age: 53
End: 2023-01-10

## 2023-01-10 DIAGNOSIS — F43.10 PTSD (POST-TRAUMATIC STRESS DISORDER): ICD-10-CM

## 2023-01-10 DIAGNOSIS — F41.1 GAD (GENERALIZED ANXIETY DISORDER): ICD-10-CM

## 2023-01-10 DIAGNOSIS — F33.0 MDD (MAJOR DEPRESSIVE DISORDER), RECURRENT EPISODE, MILD (HCC): Primary | ICD-10-CM
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
## 2023-01-30 DIAGNOSIS — F33.1 MAJOR DEPRESSIVE DISORDER, RECURRENT EPISODE, MODERATE (HCC): ICD-10-CM

## 2023-01-31 NOTE — TELEPHONE ENCOUNTER
Spoke with Ami.She had requested a refill through [facility] and was able to get the prescription filled - possible her previous prescriptions were transferred from the previous mail order. She does not need a refill at this time, although the prescription filled 1/14/23 has not arrived yet. She will call if further assistance is needed.

## 2023-01-31 NOTE — TELEPHONE ENCOUNTER
Patient called in stated that moving forward all prescriptions will be sent to [facility]. Pt was unsure or correct address or information on it but will call office back with the correct information. Pt requested the [facility] script be deleted as her employee dropped it as of January 1, 2023

## 2023-01-31 NOTE — TELEPHONE ENCOUNTER
Dr. Alberto sent a 90 day supply to [facility] 12/23/22. Ami was asked to check with her pharmacy. Nursing number given to call if further assistance is needed.

## 2023-02-03 RX ORDER — QUETIAPINE FUMARATE 25 MG/1
25 TABLET, FILM COATED ORAL
Qty: 90 TABLET | Refills: 0 | OUTPATIENT
Start: 2023-02-03 | End: 2023-05-04

## 2023-02-08 ENCOUNTER — TELEPHONE (OUTPATIENT)
Dept: PULMONOLOGY | Facility: HOSPITAL | Age: 53
End: 2023-02-08

## 2023-02-08 NOTE — TELEPHONE ENCOUNTER
Left a voicemail for patient to schedule a follow up appointment with Dr Brianna Lopez per referral placed by PCP

## 2023-02-24 ENCOUNTER — TELEMEDICINE (OUTPATIENT)
Dept: BEHAVIORAL/MENTAL HEALTH CLINIC | Facility: CLINIC | Age: 53
End: 2023-02-24

## 2023-02-24 DIAGNOSIS — F41.1 GAD (GENERALIZED ANXIETY DISORDER): ICD-10-CM

## 2023-02-24 DIAGNOSIS — F41.0 PANIC ATTACKS: ICD-10-CM

## 2023-02-24 DIAGNOSIS — F43.10 PTSD (POST-TRAUMATIC STRESS DISORDER): ICD-10-CM

## 2023-02-24 DIAGNOSIS — F33.0 MDD (MAJOR DEPRESSIVE DISORDER), RECURRENT EPISODE, MILD (HCC): Primary | ICD-10-CM
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
## 2023-02-27 NOTE — PSYCH
Virtual Regular Visit    Verification of patient location:    Patient is located in the following state in which I hold an active license [place]      Assessment/Plan:    Problem List Items Addressed This Visit        Other    MDD (major depressive disorder), recurrent episode, mild ([facility] ) - Primary    PTSD (post-traumatic stress disorder)    Panic attacks    KIP (generalized anxiety disorder)       Goals addressed in session: Goal 1          Reason for visit is   Chief Complaint   Patient presents with   • Virtual Regular Visit        Encounter provider Lyudmila Abel    Provider located at [address] [place] [address] [facility]  [phone]      Recent Visits  Date Type Provider Dept   02/24/23 [facility] recent visits within past 7 days and meeting all other requirements  Future Appointments  No visits were found meeting these conditions  Showing future appointments within next 150 days and meeting all other requirements       The patient was identified by name and date of birth  Charly Noble was informed that this is a telemedicine visit and that the visit is being conducted through the [employer]  She agrees to proceed     My office door was closed  No one else was in the room  She acknowledged consent and understanding of privacy and security of the video platform  The patient has agreed to participate and understands they can discontinue the visit at any time  Patient is aware this is a billable service         HPI     Past Medical History:   Diagnosis Date   • COPD (chronic obstructive pulmonary disease) ([facility] )    • Dysmenorrhea    • Emphysema of lung ([facility] )    • IBS (irritable bowel syndrome)    • Liver lesion 1/9/2018   • MDD (major depressive disorder), recurrent episode, moderate (H[facility]) 2/14/2018   • Menopausal symptoms    • Other emphysema ([facility] ) 2/23/2018   • PTSD (post-traumatic stress disorder) 2/14/2018   • Skin disorder    • Tick bite        Past Surgical History:   Procedure Laterality Date   • CHOLECYSTECTOMY     • KNEE SURGERY     • MICRODISCECTOMY LUMBAR  2003   • TONSILLECTOMY  1986       Current Outpatient Medications   Medication Sig Dispense Refill   • albuterol (PROAIR HFA) 90 mcg/act inhaler Inhale 2 puffs every 6 (six) hours as needed for wheezing or shortness of breath 1 Inhaler 6   • B Complex-Folic Acid (SUPER B COMPLEX MAXI) TABS Take by mouth     • budesonide-formoterol (Symbicort) 160-4 5 mcg/act inhaler Inhale 2 puffs 2 (two) times a day Rinse mouth after use  10 2 g 1   • Cholecalciferol (VITAMIN D3) 1000 units CAPS Take 2 capsules by mouth daily     • fluticasone-vilanterol (Breo Ellipta) 200-25 mcg/actuation inhaler Inhale 1 puff daily Rinse mouth after use  60 blister 5   • hydrOXYzine HCL (ATARAX) 25 mg tablet Take 1 tablet (25 mg total) by mouth 3 (three) times a day 270 tablet 0   • Magnesium 400 MG CAPS Take 400 mg by mouth daily      • Multiple Vitamin (MULTI-VITAMIN DAILY PO) Take 1 tablet by mouth daily     • Omega-3 Fatty Acids (OMEGA-3 FISH OIL PO) Take by mouth 1 daily  (-er pt---3/6/9)     • QUEtiapine (SEROquel) 25 mg tablet Take 1 tablet (25 mg total) by mouth daily at bedtime 90 tablet 1   • sertraline (ZOLOFT) 100 mg tablet Take 1 tablet (100 mg total) by mouth daily 90 tablet 1   • sertraline (Zoloft) 25 mg tablet Take 1 tablet (25 mg total) by mouth daily 90 tablet 1     No current facility-administered medications for this visit  Allergies   Allergen Reactions   • Levaquin [Levofloxacin] Throat Swelling   • Penicillins Throat Swelling   • Clonidine Angioedema       Review of Systems    Video Exam    There were no vitals filed for this visit      Physical Exam     Behavioral Health Psychotherapy Progress Note    Psychotherapy Provided: Individual Psychotherapy     1  MDD (major depressive disorder), recurrent episode, mild ([facility] [address] )        2  PTSD (post-traumatic stress disorder)        3  Panic attacks        4  KIP (generalized anxiety disorder)            Goals addressed in session: Goal 1     DATA: Didi Rm stated that things continue to be stressful in he life  She stated that she had a recent breakdown after a conversation with her father  She stated that he had mentioned that his wife would be getting the building that he had purchased after he passes  She stated that this triggered anger and sadness in her  She discussed the circumstances surrounding her mother's passing and not receiving anything even after she had assisted her mother  Discussing her overall trauma regarding rejection and abandonment and how it relates to this  Processing her emotions and discussing her childhood trauma and its present day effects on her  Discussing ways she can communicate her feelings to her father and ow they relate to her life  Also discussing continuing to heal from her trauma and eliminate unhealthy people form her life  Giving supportive therapy  During this session, this clinician used the following therapeutic modalities: Cognitive Processing Therapy    Substance Abuse was not addressed during this session  If the client is diagnosed with a co-occurring substance use disorder, please indicate any changes in the frequency or amount of use: NB/A  Stage of change for addressing substance use diagnoses: No substance use/Not applicable    ASSESSMENT:  Parish Tobias presents with a Angry mood  her affect is Normal range and intensity, which is congruent, with her mood and the content of the session  The client has made progress on their goals  Continues to process her trauma Parish Tobias presents with a none risk of suicide, none risk of self-harm, and none risk of harm to others      For any risk assessment that surpasses a "low" rating, a safety plan must be developed  A safety plan was indicated: no  If yes, describe in detail N/A    PLAN: Between sessions, Ochoa Hernandez will continue to process her trauma  At the next session, the therapist will use Cognitive Behavioral Therapy to address treatment goals  Behavioral Health Treatment Plan and Discharge Planning: Ochoa Hernandez is aware of and agrees to continue to work on their treatment plan  They have identified and are working toward their discharge goals   yes    Visit start and stop times:    02/24/23  Start Time: 1110  Stop Time: 1201  Total Visit Time: 51 minutes

## 2023-03-09 ENCOUNTER — TELEPHONE (OUTPATIENT)
Dept: PSYCHIATRY | Facility: CLINIC | Age: 53
End: 2023-03-09

## 2023-03-09 NOTE — TELEPHONE ENCOUNTER
Returned pts call, who states she started having fatigue, shortness of breath, palpitations, hives, swelling on the roof of her mouth  Pt admits these symptoms subsided once she discontinued the atarax  She is agreeable to stay off it, since her anxiety is well controlled with Zoloft and utilization of her coping mechanisms from psychotherapy

## 2023-03-09 NOTE — TELEPHONE ENCOUNTER
Maria Ocasio  requested a call back to discuss pt is having an allergic reaction to medication  She is not taking it since Friday  Wants to be prescribe something else       They can be reached at [phone]   Thank you

## 2023-03-21 ENCOUNTER — OFFICE VISIT (OUTPATIENT)
Dept: FAMILY MEDICINE CLINIC | Facility: HOSPITAL | Age: 53
End: 2023-03-21

## 2023-03-21 VITALS
HEIGHT: 63 IN | WEIGHT: 169.8 LBS | BODY MASS INDEX: 30.09 KG/M2 | OXYGEN SATURATION: 97 % | TEMPERATURE: 98 F | SYSTOLIC BLOOD PRESSURE: 114 MMHG | HEART RATE: 73 BPM | DIASTOLIC BLOOD PRESSURE: 70 MMHG

## 2023-03-21 DIAGNOSIS — R09.81 SINUS CONGESTION: Primary | ICD-10-CM

## 2023-03-21 DIAGNOSIS — J43.8 OTHER EMPHYSEMA (HCC): ICD-10-CM

## 2023-03-21 DIAGNOSIS — E78.2 MIXED HYPERLIPIDEMIA: ICD-10-CM

## 2023-03-21 DIAGNOSIS — L50.9 URTICARIA: ICD-10-CM

## 2023-03-21 DIAGNOSIS — F33.0 MDD (MAJOR DEPRESSIVE DISORDER), RECURRENT EPISODE, MILD (HCC): ICD-10-CM

## 2023-03-21 DIAGNOSIS — J01.00 ACUTE NON-RECURRENT MAXILLARY SINUSITIS: ICD-10-CM

## 2023-03-21 DIAGNOSIS — F41.0 PANIC ATTACKS: ICD-10-CM

## 2023-03-21 DIAGNOSIS — Z72.0 TOBACCO ABUSE: ICD-10-CM

## 2023-03-21 DIAGNOSIS — Z12.31 ENCOUNTER FOR SCREENING MAMMOGRAM FOR MALIGNANT NEOPLASM OF BREAST: ICD-10-CM

## 2023-03-21 RX ORDER — CLARITHROMYCIN 500 MG/1
500 TABLET, COATED ORAL EVERY 12 HOURS SCHEDULED
Qty: 14 TABLET | Refills: 1 | Status: SHIPPED | OUTPATIENT
Start: 2023-03-21 | End: 2023-04-04

## 2023-03-21 NOTE — ASSESSMENT & PLAN NOTE
Had been on breo but not covered by her insurance- then tried on airduo as requested by her insurance and will switch to symbicort trial

## 2023-03-21 NOTE — PROGRESS NOTES
Assessment/Plan:     Diagnosis ICD-10-CM Associated Orders   1  Sinus congestion  R09 81       2  Emphysema ([facility] [address] )  J43 8       3  Urticaria  L50 9       4  MDD (major depressive disorder), recurrent episode, mild ([address] )  F33 0       5  Tobacco abuse  Z72 0       6  Mixed hyperlipidemia  E78 2       7  Panic attacks  F41 0       8  Acute non-recurrent maxillary sinusitis  J01 00 clarithromycin (BIAXIN) 500 mg tablet          Problem List Items Addressed This Visit        Respiratory    Emphysema ([facility] [address] )     Had been on breo but not covered by her insurance- then tried on airduo as requested by her insurance and will switch to symbicort trial            Musculoskeletal and Integument    Urticaria       Other    MDD (major depressive disorder), recurrent episode, mild ([facility])     Stable on zoloft and seroquel 25 mg at bedtime  Then had atarax added in feb 15, 2022- with atarax- had worsening breathing in 2022- ( had switched from breo to airdu) then ahd severe itching and likely  Allergic reaction  To the atarax- had facial issues with hives and oral ulcers- when she stopped the atarax her breathing and hives receded  Tobacco abuse     On  Less than a pack pre day- has has increased stressors- her  Aunt    She is helping autistic  Nephew is tyring to  Deal with this         Panic attacks    Mixed hyperlipidemia   Other Visit Diagnoses     Sinus congestion    -  Primary    Acute non-recurrent maxillary sinusitis        Relevant Medications    clarithromycin (BIAXIN) 500 mg tablet            No follow-ups on file  Subjective:    Patient ID: Alaina Dupree is a 47 y o  female    1  Urticaria reaction-   See cassandra hampton due to atarax- had similar reaction to  clinidine with  Angioedema and swelling and some hives- will check to see inert ingredients if there are any overlaps  2   Sinusitis- started 5 days ago - pressue and some left ear pain    had flu shot this year       The following portions of the patient's history were reviewed and updated as appropriate: allergies, current medications and problem list      Review of Systems   Constitutional: Negative for chills and fever  HENT: Positive for congestion, sinus pressure and sinus pain  Has soreness on left  Upper jaw implant   follows with dentist  Every 6 month  Thick  Sputum this am- milky in color    Respiratory: Positive for cough  Negative for shortness of breath  Cardiovascular: Negative for chest pain  Musculoskeletal: Positive for neck stiffness  In back of neck    All other systems reviewed and are negative          Objective:      Current Outpatient Medications:   •  albuterol (PROAIR HFA) 90 mcg/act inhaler, Inhale 2 puffs every 6 (six) hours as needed for wheezing or shortness of breath, Disp: 1 Inhaler, Rfl: 6  •  B Complex-Folic Acid (SUPER B COMPLEX MAXI) TABS, Take by mouth, Disp: , Rfl:   •  budesonide-formoterol (Symbicort) 160-4 5 mcg/act inhaler, Inhale 2 puffs 2 (two) times a day Rinse mouth after use , Disp: 10 2 g, Rfl: 1  •  Cholecalciferol (VITAMIN D3) 1000 units CAPS, Take 2 capsules by mouth daily, Disp: , Rfl:   •  clarithromycin (BIAXIN) 500 mg tablet, Take 1 tablet (500 mg total) by mouth every 12 (twelve) hours for 14 days, Disp: 14 tablet, Rfl: 1  •  fluticasone-vilanterol (Breo Ellipta) 200-25 mcg/actuation inhaler, Inhale 1 puff daily Rinse mouth after use , Disp: 60 blister, Rfl: 5  •  Magnesium 400 MG CAPS, Take 400 mg by mouth daily , Disp: , Rfl:   •  Multiple Vitamin (MULTI-VITAMIN DAILY PO), Take 1 tablet by mouth daily, Disp: , Rfl:   •  Omega-3 Fatty Acids (OMEGA-3 FISH OIL PO), Take by mouth 1 daily  (-er pt---3/6/9), Disp: , Rfl:   •  QUEtiapine (SEROquel) 25 mg tablet, Take 1 tablet (25 mg total) by mouth daily at bedtime, Disp: 90 tablet, Rfl: 1  •  sertraline (ZOLOFT) 100 mg tablet, Take 1 tablet (100 mg total) by mouth daily, Disp: 90 tablet, Rfl: 1  •  sertraline (Zoloft) 25 mg tablet, Take 1 tablet (25 mg total) by mouth daily, Disp: 90 tablet, Rfl: 1    Blood pressure 114/70, pulse 73, temperature 98 °F (36 7 °C), height 5' 3" (1 6 m), weight 77 kg (169 lb 12 8 oz), SpO2 97 %, not currently breastfeeding  Physical Exam  Vitals and nursing note reviewed  Constitutional:       General: She is not in acute distress  HENT:      Head: Normocephalic and atraumatic  Right Ear: Tympanic membrane normal       Ears:      Comments: Left tm dull with air fluid levels     Nose: Congestion present  Mouth/Throat:      Pharynx: No oropharyngeal exudate or posterior oropharyngeal erythema  Cardiovascular:      Rate and Rhythm: Normal rate and regular rhythm  Heart sounds: No murmur heard  Pulmonary:      Breath sounds: No wheezing or rhonchi  Comments: Generally decreased breath sounds  Abdominal:      General: There is no distension  Palpations: Abdomen is soft  Tenderness: There is no abdominal tenderness  Musculoskeletal:         General: No tenderness  Neurological:      Mental Status: She is alert  Psychiatric:         Mood and Affect: Mood normal          Thought Content:  Thought content normal          Judgment: Judgment normal

## 2023-03-21 NOTE — ASSESSMENT & PLAN NOTE
On  Less than a pack pre day- has has increased stressors- her  Aunt     She is helping autistic  Nephew is tyring to  Deal with this

## 2023-03-21 NOTE — ASSESSMENT & PLAN NOTE
Stable on zoloft and seroquel 25 mg at bedtime  Then had atarax added in feb 15, 2022- with atarax- had worsening breathing in august 2022- ( had switched from breo to airdu) then ahd severe itching and likely  Allergic reaction  To the atarax- had facial issues with hives and oral ulcers- when she stopped the atarax her breathing and hives receded

## 2023-03-22 DIAGNOSIS — L50.9 URTICARIA: ICD-10-CM

## 2023-03-22 DIAGNOSIS — T50.905A ADVERSE EFFECT OF DRUG, INITIAL ENCOUNTER: Primary | ICD-10-CM

## 2023-03-22 LAB
FLUAV RNA RESP QL NAA+PROBE: NEGATIVE
FLUBV RNA RESP QL NAA+PROBE: NEGATIVE
SARS-COV-2 RNA RESP QL NAA+PROBE: NEGATIVE

## 2023-03-30 ENCOUNTER — TELEMEDICINE (OUTPATIENT)
Dept: PSYCHIATRY | Facility: CLINIC | Age: 53
End: 2023-03-30

## 2023-03-30 DIAGNOSIS — F33.0 MDD (MAJOR DEPRESSIVE DISORDER), RECURRENT EPISODE, MILD (HCC): Primary | ICD-10-CM

## 2023-03-30 DIAGNOSIS — Z72.0 TOBACCO ABUSE: ICD-10-CM

## 2023-03-30 DIAGNOSIS — F43.10 PTSD (POST-TRAUMATIC STRESS DISORDER): ICD-10-CM

## 2023-03-30 DIAGNOSIS — F41.1 GAD (GENERALIZED ANXIETY DISORDER): ICD-10-CM

## 2023-03-30 RX ORDER — SERTRALINE HYDROCHLORIDE 100 MG/1
150 TABLET, FILM COATED ORAL DAILY
Qty: 135 TABLET | Refills: 1 | Status: SHIPPED | OUTPATIENT
Start: 2023-03-30 | End: 2023-06-28

## 2023-03-30 RX ORDER — QUETIAPINE FUMARATE 25 MG/1
25 TABLET, FILM COATED ORAL
Qty: 90 TABLET | Refills: 1 | Status: SHIPPED | OUTPATIENT
Start: 2023-03-30 | End: 2023-06-28
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
## 2023-04-25 ENCOUNTER — DOCUMENTATION (OUTPATIENT)
Dept: PULMONOLOGY | Facility: HOSPITAL | Age: 53
End: 2023-04-25

## 2023-04-25 ENCOUNTER — OFFICE VISIT (OUTPATIENT)
Dept: PULMONOLOGY | Facility: HOSPITAL | Age: 53
End: 2023-04-25

## 2023-04-25 ENCOUNTER — PATIENT MESSAGE (OUTPATIENT)
Dept: PULMONOLOGY | Facility: HOSPITAL | Age: 53
End: 2023-04-25

## 2023-04-25 VITALS
OXYGEN SATURATION: 98 % | RESPIRATION RATE: 14 BRPM | HEART RATE: 82 BPM | WEIGHT: 169.4 LBS | DIASTOLIC BLOOD PRESSURE: 74 MMHG | HEIGHT: 63 IN | TEMPERATURE: 99 F | SYSTOLIC BLOOD PRESSURE: 124 MMHG | BODY MASS INDEX: 30.02 KG/M2

## 2023-04-25 DIAGNOSIS — J43.8 OTHER EMPHYSEMA (HCC): ICD-10-CM

## 2023-04-25 DIAGNOSIS — F17.210 CIGARETTE NICOTINE DEPENDENCE WITHOUT COMPLICATION: Primary | ICD-10-CM

## 2023-04-25 DIAGNOSIS — G47.33 OSA (OBSTRUCTIVE SLEEP APNEA): ICD-10-CM

## 2023-04-25 RX ORDER — FLUTICASONE FUROATE, UMECLIDINIUM BROMIDE AND VILANTEROL TRIFENATATE 200; 62.5; 25 UG/1; UG/1; UG/1
1 POWDER RESPIRATORY (INHALATION) DAILY
Qty: 180 BLISTER | Refills: 3 | Status: SHIPPED | OUTPATIENT
Start: 2023-04-25 | End: 2024-04-19

## 2023-04-25 NOTE — PROGRESS NOTES
Assessment:    Cigarette nicotine dependence without complication  We discussed smoking cessation for 5 minutes  She will decrease by 1 daily ceg each week with goal to stop smoking in 20 weeks  In the meantime she will work on different habits to break such as not smoking first thing in the morning, only smoking outside, not smoking in her car, etc   She will also put money in a jar for each pack of cigarettes that she saves, to save for a treat such as a vacation  DANIA (obstructive sleep apnea)  She hasn't been using regularly since getting the new machine  Need new supplies -I ordered today  Would like to talk with Dr Delmer Hill about Inspire device    Emphysema [facility])  She has had significant increase in her respiratory symptoms not currently controlled on ICS/LABA  We will start Trelegy 1 puff once daily  Adequate oxygenation,Not requiring oxygen  Continue to increase activity level as tolerated  Plan:    Diagnoses and all orders for this visit:    Cigarette nicotine dependence without complication  -     fluticasone-umeclidinium-vilanterol (Trelegy Ellipta) 200-62 5-25 mcg/actuation AEPB inhaler; Inhale 1 puff daily Rinse mouth after use  Emphysema [facility])  -     Ambulatory Referral to Pulmonology  -     fluticasone-umeclidinium-vilanterol (Trelegy Ellipta) 626-92 8-18 mcg/actuation AEPB inhaler; Inhale 1 puff daily Rinse mouth after use  DANIA (obstructive sleep apnea)  -     Ambulatory Referral to Pulmonology  -     PAP DME Resupply/Reorder    Other orders  -     PAP Accessories Package        Follow-up: 3-4 months      HPI:  76-year-old female here for follow-up of COPD and sleep apnea  Mild ([facility]I 10 ) sleep apnea treated with AutoPap 5-20  COPD/emphysema- our last office visit February 2020 where she was on Breo once daily  She was still smoking at that point and was starting Chantix  She was started on an anxiety medication and it cause an allergic reaction   During that time she had "to go from [place] [facility]  She was having shortness of breath with lower lobe tightness  She has since tried Symbicort and still doesn't feel like it works well  She is still smoking  She was on chantix and it helped but she was still smoking  She is at 1ppd - she is the only smoker at home  Lots of productive cough  Increased dyspnea and chest tightness/wheezing      Review of Systems   Constitutional: Positive for appetite change  Negative for fever  HENT: Positive for ear pain, postnasal drip, rhinorrhea, sneezing, sore throat and trouble swallowing  Respiratory: Positive for cough, shortness of breath and wheezing  Cardiovascular: Positive for chest pain  Musculoskeletal: Positive for myalgias  Neurological: Positive for headaches         The following portions of the patient's history were reviewed and updated as appropriate: allergies, current medications, past family history, past medical history, past social history, past surgical history and problem list     VITALS:  Vitals:    04/25/23 1452 04/25/23 1455   BP: 124/74    BP Location: Left arm    Patient Position: Sitting    Cuff Size: Standard    Pulse: 82    Resp: 14    Temp: 99 °F (37 2 °C)    TempSrc: Tympanic    SpO2: 98% 98%   Weight: 76 8 kg (169 lb 6 4 oz)    Height: 5' 3\" (1 6 m)        Physical Exam  General:  Patient is awake, alert, non-toxic and in no acute respiratory distress  Neck: No JVD  CV:  Regular, +S1 and S2, No murmurs, gallops or rubs appreciated  Lungs: CTA bilateral without wheeze  Abdomen: Soft, +BS, Non-tender, non-distended  Extremities: No clubbing, cyanosis or edema  Neuro: No focal deficits        Diagnostic Testing:    PFTs:  Repeat ordered by Dr Debora Molina, needs scheduled by patient    CBC:  Lab Results   Component Value Date    WBC 10 72 (H) 12/11/2022    HGB 14 6 12/11/2022    HCT 42 1 12/11/2022    MCV 92 12/11/2022     12/11/2022         BMP:   Lab Results   Component Value Date     01/24/2017    " K 3 6 12/11/2022     12/11/2022    CO2 28 12/11/2022    BUN 17 12/11/2022    CREATININE 0 90 12/11/2022    CALCIUM 9 7 12/11/2022    AST 13 12/11/2022    ALT 22 12/11/2022    ALKPHOS 84 12/11/2022    PROT 6 6 01/24/2017    BILITOT 0 3 01/24/2017    EGFR 73 12/11/2022         Imaging studies:  I have personally reviewed pertinent reports  Chest x-ray 12/11/2022: Images currently not available  FINDINGS:     Cardiomediastinal silhouette appears unremarkable      The lungs are clear    No pneumothorax or pleural effusion      Osseous structures appear within normal limits for patient age      IMPRESSION:     No acute cardiopulmonary disease      Findings are stable      Erendira Shepard DO       Answers for HPI/ROS submitted by the patient on 4/25/2023  Do you have chest tightness?: Yes  Do you have difficulty breathing?: Yes  Do you have a hoarse voice?: Yes  Do you have a wet cough?: Yes  Chronicity: chronic  When did you first notice your symptoms?: more than 1 month ago  How often do your symptoms occur?: constantly  Since you first noticed this problem, how has it changed?: gradually worsening  Do you have shortness of breath that occurs with effort or exertion?: Yes  Do you have ear congestion?: Yes  Do you have heartburn?: Yes  Do you have fatigue?: Yes  Do you have nasal congestion?: Yes  Do you have shortness of breath when lying flat?: Yes  Do you have sweats?: Yes  Have you experienced weight loss?: No  Which of the following makes your symptoms worse?: climbing stairs, strenuous activity  Which of the following makes your symptoms better?: cold air, rest, steroid inhaler  Risk factors for lung disease: smoking/tobacco exposure

## 2023-04-25 NOTE — ASSESSMENT & PLAN NOTE
She has had significant increase in her respiratory symptoms not currently controlled on ICS/LABA  We will start Trelegy 1 puff once daily  Adequate oxygenation,Not requiring oxygen  Continue to increase activity level as tolerated

## 2023-04-25 NOTE — ASSESSMENT & PLAN NOTE
We discussed smoking cessation for 5 minutes  She will decrease by 1 daily ceg each week with goal to stop smoking in 20 weeks  In the meantime she will work on different habits to break such as not smoking first thing in the morning, only smoking outside, not smoking in her car, etc   She will also put money in a jar for each pack of cigarettes that she saves, to save for a treat such as a vacation

## 2023-04-25 NOTE — ASSESSMENT & PLAN NOTE
She hasn't been using regularly since getting the new machine   Need new supplies -I ordered today  Would like to talk with Dr Bernard Milner about [facility] device

## 2023-04-26 ENCOUNTER — TELEMEDICINE (OUTPATIENT)
Dept: BEHAVIORAL/MENTAL HEALTH CLINIC | Facility: CLINIC | Age: 53
End: 2023-04-26

## 2023-04-26 DIAGNOSIS — F33.0 MDD (MAJOR DEPRESSIVE DISORDER), RECURRENT EPISODE, MILD (HCC): ICD-10-CM

## 2023-04-26 DIAGNOSIS — F17.210 CIGARETTE NICOTINE DEPENDENCE WITHOUT COMPLICATION: ICD-10-CM

## 2023-04-26 DIAGNOSIS — F43.10 PTSD (POST-TRAUMATIC STRESS DISORDER): ICD-10-CM

## 2023-04-26 DIAGNOSIS — F41.0 PANIC ATTACKS: ICD-10-CM

## 2023-04-26 DIAGNOSIS — F41.1 GAD (GENERALIZED ANXIETY DISORDER): Primary | ICD-10-CM

## 2023-04-26 NOTE — PSYCH
Virtual Regular Visit    Verification of patient location:    Patient is located at Home in the following state in which I hold an active license [place]      Assessment/Plan:    Problem List Items Addressed This Visit        Other    MDD (major depressive disorder), recurrent episode, mild ([facility] [address] )    PTSD (post-traumatic stress disorder)    Cigarette nicotine dependence without complication    Panic attacks    KIP (generalized anxiety disorder) - Primary       Goals addressed in session: Goal 1          Reason for visit is   Chief Complaint   Patient presents with   • Virtual Regular Visit        Encounter provider Zane Huff    Provider located at [address] [place] [id number] [phone]      Recent Visits  No visits were found meeting these conditions  Showing recent visits within past 7 days and meeting all other requirements  Today's Visits  Date Type Provider Dept   04/26/23 [address] today's visits and meeting all other requirements  Future Appointments  No visits were found meeting these conditions  Showing future appointments within next 150 days and meeting all other requirements       The patient was identified by name and date of birth  Luiza Shanejanice was informed that this is a telemedicine visit and that the visit is being conducted through the [facility]  She agrees to proceed     My office door was closed  No one else was in the room  She acknowledged consent and understanding of privacy and security of the video platform  The patient has agreed to participate and understands they can discontinue the visit at any time  Patient is aware this is a billable service       HPI     Past Medical History:   Diagnosis Date   • Asthma    • COPD (chronic obstructive pulmonary disease) ([facility] [address] )    • Dysmenorrhea • Emphysema of lung ([facility] )    • Hypertension    • IBS (irritable bowel syndrome)    • Liver lesion 01/09/2018   • MDD (major depressive disorder), recurrent episode, moderate ([facility] ) 02/14/2018   • Menopausal symptoms    • Other emphysema ([facility] ) 02/23/2018   • PTSD (post-traumatic stress disorder) 02/14/2018   • Skin disorder    • Tick bite        Past Surgical History:   Procedure Laterality Date   • CHOLECYSTECTOMY     • KNEE SURGERY     • MICRODISCECTOMY LUMBAR  2003   • SPINE SURGERY     • TONSILLECTOMY  1986       Current Outpatient Medications   Medication Sig Dispense Refill   • albuterol (PROAIR HFA) 90 mcg/act inhaler Inhale 2 puffs every 6 (six) hours as needed for wheezing or shortness of breath 1 Inhaler 6   • B Complex-Folic Acid (SUPER B COMPLEX MAXI) TABS Take by mouth     • Cholecalciferol (VITAMIN D3) 1000 units CAPS Take 2 capsules by mouth daily     • fluticasone-umeclidinium-vilanterol (Trelegy Ellipta) 200-62 5-25 mcg/actuation AEPB inhaler Inhale 1 puff daily Rinse mouth after use  180 blister 3   • Magnesium 400 MG CAPS Take 400 mg by mouth daily      • Multiple Vitamin (MULTI-VITAMIN DAILY PO) Take 1 tablet by mouth daily     • Omega-3 Fatty Acids (OMEGA-3 FISH OIL PO) Take by mouth 1 daily  (-er pt---3/6/9)     • QUEtiapine (SEROquel) 25 mg tablet Take 1 tablet (25 mg total) by mouth daily at bedtime 90 tablet 1   • sertraline (ZOLOFT) 100 mg tablet Take 1 5 tablets (150 mg total) by mouth daily 135 tablet 1     No current facility-administered medications for this visit  Allergies   Allergen Reactions   • Hydroxyzine Anaphylaxis, Cough, Dermatitis, Drowsiness, Facial Swelling, Fatigue, Hives, Itching, Palpitations, Shortness Of Breath, Throat Swelling and Wheezing   • Levaquin [Levofloxacin] Throat Swelling   • Penicillins Throat Swelling   • Clonidine Angioedema       Review of Systems    Video Exam    There were no vitals filed for this visit      Physical Exam     Behavioral Health Psychotherapy Progress Note    Psychotherapy Provided: Individual Psychotherapy     1  KIP (generalized anxiety disorder)        2  PTSD (post-traumatic stress disorder)        3  MDD (major depressive disorder), recurrent episode, mild ([address] )        4  Panic attacks        5  Cigarette nicotine dependence without complication            Goals addressed in session: Goal 1     DATA: Nicki Coronado stated that she has been frustrated in her relationship wit her friend Audra Goodpasture  She stated that she feels that despite her constant communication regarding the fact that this is a platonic relationship she feels that he continues to attempt to steer towards romantic involvement  Sh discussed recent conversations that they have had and how she has been communicating this to him  She stated that she knows that he is on the Spectrum and feels that this is a factor  Discussing Spectrum disorders and how they affect a person's thought process  Discussing the importance of setting boundaries in ways that he can comprehend  Also discussing how his behaviors have been affecting her and ways for her to navigate this  Giving supportive therapy  During this session, this clinician used the following therapeutic modalities: Cognitive Behavioral Therapy    Substance Abuse was not addressed during this session  If the client is diagnosed with a co-occurring substance use disorder, please indicate any changes in the frequency or amount of use: N/A  Stage of change for addressing substance use diagnoses: No substance use/Not applicable    ASSESSMENT:  Kt Oquendo presents with a Euthymic/ normal mood  her affect is Normal range and intensity, which is congruent, with her mood and the content of the session  The client has made progress on their goals  Continues to build and maintain healthy relationships in her life Kt Oquendo presents with a none risk of suicide, none risk of self-harm, and none risk of harm to others      For any risk "assessment that surpasses a \"low\" rating, a safety plan must be developed  A safety plan was indicated: no  If yes, describe in detail N/A    PLAN: Between sessions, Jules Polanco will continue to build and maintain healthy relationship in her life  At the next session, the therapist will use Cognitive Behavioral Therapy to address treatment goals  Behavioral Health Treatment Plan and Discharge Planning: Jules Polanco is aware of and agrees to continue to work on their treatment plan  They have identified and are working toward their discharge goals   yes    Visit start and stop times:    04/26/23  Start Time: 1502  Stop Time: 1553  Total Visit Time: 51 minutes        "

## 2023-04-27 LAB

## 2023-04-28 ENCOUNTER — PATIENT MESSAGE (OUTPATIENT)
Dept: PULMONOLOGY | Facility: HOSPITAL | Age: 53
End: 2023-04-28

## 2023-05-10 ENCOUNTER — TELEPHONE (OUTPATIENT)
Dept: PSYCHIATRY | Facility: CLINIC | Age: 53
End: 2023-05-10

## 2023-05-15 ENCOUNTER — TELEPHONE (OUTPATIENT)
Dept: PULMONOLOGY | Facility: CLINIC | Age: 53
End: 2023-05-15

## 2023-05-15 DIAGNOSIS — F17.210 CIGARETTE NICOTINE DEPENDENCE WITHOUT COMPLICATION: ICD-10-CM

## 2023-05-15 DIAGNOSIS — J43.8 OTHER EMPHYSEMA (HCC): ICD-10-CM

## 2023-05-15 RX ORDER — FLUTICASONE FUROATE, UMECLIDINIUM BROMIDE AND VILANTEROL TRIFENATATE 200; 62.5; 25 UG/1; UG/1; UG/1
1 POWDER RESPIRATORY (INHALATION) DAILY
Qty: 180 BLISTER | Refills: 3 | Status: SHIPPED | OUTPATIENT
Start: 2023-05-15 | End: 2024-05-09

## 2023-05-15 NOTE — TELEPHONE ENCOUNTER
I called pt and left VM informing her that the script was sent to [facility] for a 90 day supply with 3 refills

## 2023-05-15 NOTE — TELEPHONE ENCOUNTER
Patent lvm stating that the pharmacy told her that they need a new script for the Trelegy sent in since the prior auth was approved  Please advise

## 2023-05-22 ENCOUNTER — HOSPITAL ENCOUNTER (OUTPATIENT)
Dept: MAMMOGRAPHY | Facility: IMAGING CENTER | Age: 53
Discharge: HOME | End: 2023-05-22

## 2023-05-22 VITALS — BODY MASS INDEX: 29.95 KG/M2 | WEIGHT: 169 LBS | HEIGHT: 63 IN

## 2023-05-22 DIAGNOSIS — Z12.31 ENCOUNTER FOR SCREENING MAMMOGRAM FOR MALIGNANT NEOPLASM OF BREAST: ICD-10-CM

## 2023-06-01 ENCOUNTER — TELEMEDICINE (OUTPATIENT)
Dept: BEHAVIORAL/MENTAL HEALTH CLINIC | Facility: CLINIC | Age: 53
End: 2023-06-01

## 2023-06-01 ENCOUNTER — HOSPITAL ENCOUNTER (OUTPATIENT)
Dept: ULTRASOUND IMAGING | Facility: CLINIC | Age: 53
Discharge: HOME | End: 2023-06-01

## 2023-06-01 DIAGNOSIS — F33.0 MDD (MAJOR DEPRESSIVE DISORDER), RECURRENT EPISODE, MILD (HCC): Primary | ICD-10-CM

## 2023-06-01 DIAGNOSIS — F41.1 GAD (GENERALIZED ANXIETY DISORDER): ICD-10-CM

## 2023-06-01 DIAGNOSIS — R92.8 ABNORMAL SCREENING MAMMOGRAM: ICD-10-CM

## 2023-06-01 DIAGNOSIS — F41.0 PANIC ATTACKS: ICD-10-CM

## 2023-06-01 DIAGNOSIS — F17.210 CIGARETTE NICOTINE DEPENDENCE WITHOUT COMPLICATION: ICD-10-CM

## 2023-06-01 DIAGNOSIS — F43.10 PTSD (POST-TRAUMATIC STRESS DISORDER): ICD-10-CM

## 2023-06-01 NOTE — PSYCH
Virtual Regular Visit    Verification of patient location:    Patient is located at Home in the following state in which I hold an active license [place]      Assessment/Plan:    Problem List Items Addressed This Visit        Other    MDD (major depressive disorder), recurrent episode, mild ([facility] ) - Primary    PTSD (post-traumatic stress disorder)    Cigarette nicotine dependence without complication    Panic attacks    KIP (generalized anxiety disorder)       Goals addressed in session: Goal 1          Reason for visit is   Chief Complaint   Patient presents with   • Virtual Regular Visit        Encounter provider Geovanna Moore    Provider located at [address] [place] [id number] [phone]      Recent Visits  No visits were found meeting these conditions  Showing recent visits within past 7 days and meeting all other requirements  Today's Visits  Date Type Provider Dept   06/01/23 [address] today's visits and meeting all other requirements  Future Appointments  No visits were found meeting these conditions  Showing future appointments within next 150 days and meeting all other requirements       The patient was identified by name and date of birth  Nikki Octavia was informed that this is a telemedicine visit and that the visit is being conducted through the [employer]  She agrees to proceed     My office door was closed  No one else was in the room  She acknowledged consent and understanding of privacy and security of the video platform  The patient has agreed to participate and understands they can discontinue the visit at any time  Patient is aware this is a billable service         HPI     Past Medical History:   Diagnosis Date   • Asthma    • COPD (chronic obstructive pulmonary disease) ([facility] [address] )    • Dysmenorrhea • Emphysema of lung ([facility] )    • Hypertension    • IBS (irritable bowel syndrome)    • Liver lesion 01/09/2018   • MDD (major depressive disorder), recurrent episode, moderate ([facility] ) 02/14/2018   • Menopausal symptoms    • Other emphysema ([facility] ) 02/23/2018   • PTSD (post-traumatic stress disorder) 02/14/2018   • Skin disorder    • Tick bite        Past Surgical History:   Procedure Laterality Date   • CHOLECYSTECTOMY     • KNEE SURGERY     • MICRODISCECTOMY LUMBAR  2003   • SPINE SURGERY     • TONSILLECTOMY  1986       Current Outpatient Medications   Medication Sig Dispense Refill   • albuterol (PROAIR HFA) 90 mcg/act inhaler Inhale 2 puffs every 6 (six) hours as needed for wheezing or shortness of breath 1 Inhaler 6   • B Complex-Folic Acid (SUPER B COMPLEX MAXI) TABS Take by mouth     • Cholecalciferol (VITAMIN D3) 1000 units CAPS Take 2 capsules by mouth daily     • fluticasone-umeclidinium-vilanterol (Trelegy Ellipta) 200-62 5-25 mcg/actuation AEPB inhaler Inhale 1 puff daily Rinse mouth after use  180 blister 3   • Magnesium 400 MG CAPS Take 400 mg by mouth daily      • Multiple Vitamin (MULTI-VITAMIN DAILY PO) Take 1 tablet by mouth daily     • Omega-3 Fatty Acids (OMEGA-3 FISH OIL PO) Take by mouth 1 daily  (-er pt---3/6/9)     • QUEtiapine (SEROquel) 25 mg tablet Take 1 tablet (25 mg total) by mouth daily at bedtime 90 tablet 1   • sertraline (ZOLOFT) 100 mg tablet Take 1 5 tablets (150 mg total) by mouth daily 135 tablet 1     No current facility-administered medications for this visit  Allergies   Allergen Reactions   • Hydroxyzine Anaphylaxis, Cough, Dermatitis, Drowsiness, Facial Swelling, Fatigue, Hives, Itching, Palpitations, Shortness Of Breath, Throat Swelling and Wheezing   • Levaquin [Levofloxacin] Throat Swelling   • Penicillins Throat Swelling   • Clonidine Angioedema       Review of Systems    Video Exam    There were no vitals filed for this visit      Physical Exam     Behavioral Health Psychotherapy Progress Note    Psychotherapy Provided: Individual Psychotherapy     1  MDD (major depressive disorder), recurrent episode, mild ([address] )        2  PTSD (post-traumatic stress disorder)        3  Panic attacks        4  KIP (generalized anxiety disorder)        5  Cigarette nicotine dependence without complication            Goals addressed in session: Goal 1     DATA: Renée Arana stated that she has been doing well overall  She stated that she has been setting boundaries with her friend and she feels that it has been effective even though she feels that he does not comprehend why she is doing this  She stated that she has been exploring options to meet new friends such as joining a women's camping group, spending time with a new friend that is known to her family and possibly finding a female traveling   She also stated that she is trying to become more active in her car groups  Discussing her ongoing progress and ability to maintain more positivity in her life  She stated that she has been working on communicating to her uncle how his negativity has affected her  She stated that he has been very receptive to this and has been making positive changes  Giving positive feedback and supportive therapy  During this session, this clinician used the following therapeutic modalities: Cognitive Behavioral Therapy    Substance Abuse was not addressed during this session  If the client is diagnosed with a co-occurring substance use disorder, please indicate any changes in the frequency or amount of use: N/A  Stage of change for addressing substance use diagnoses: No substance use/Not applicable    ASSESSMENT:  Lucinda Pantoja presents with a Euthymic/ normal mood  her affect is Normal range and intensity, which is congruent, with her mood and the content of the session  The client has made progress on their goals      continues to increase use of positive thought Lucinda Pantoja presents with a none risk of "suicide, none risk of self-harm, and none risk of harm to others  For any risk assessment that surpasses a \"low\" rating, a safety plan must be developed  A safety plan was indicated: no  If yes, describe in detail N/A    PLAN: Between sessions, Rachel Nava will continue to increase use of positive thought  At the next session, the therapist will use Cognitive Behavioral Therapy to address treatment goals  Behavioral Health Treatment Plan and Discharge Planning: Rachel Nava is aware of and agrees to continue to work on their treatment plan  They have identified and are working toward their discharge goals   yes    Visit start and stop times:    06/01/23  Start Time: 1500  Stop Time: 1544  Total Visit Time: 44 minutes  "

## 2023-06-07 DIAGNOSIS — L50.9 URTICARIA: Primary | ICD-10-CM

## 2023-06-09 LAB
ALMOND IGE QN: <0.1 KU/L
CASHEW NUT IGE QN: <0.1 KU/L
CLASS: 0
CODFISH IGE QN: <0.1 KU/L
DEPRECATED ALMOND IGE RAST QL: 0
DEPRECATED CASHEW NUT IGE RAST QL: 0
DEPRECATED CODFISH IGE RAST QL: 0
DEPRECATED EGG WHITE IGE RAST QL: 0
DEPRECATED HAZELNUT IGE RAST QL: 0
DEPRECATED MILK IGE RAST QL: 0
DEPRECATED PEANUT IGE RAST QL: 0
DEPRECATED POTATO IGE RAST QL: 0
DEPRECATED SALMON IGE RAST QL: 0
DEPRECATED SCALLOP IGE RAST QL: 0
DEPRECATED SESAME SEED IGE RAST QL: 0
DEPRECATED SHRIMP IGE RAST QL: 0
DEPRECATED SOYBEAN IGE RAST QL: 0
DEPRECATED TUNA IGE RAST QL: 0
DEPRECATED WALNUT IGE RAST QL: 0
DEPRECATED WHEAT IGE RAST QL: 0
EGG WHITE IGE QN: <0.1 KU/L
HAZELNUT IGE QN: <0.1 KU/L
MILK IGE QN: <0.1 KU/L
PEANUT IGE QN: <0.1 KU/L
POTATO IGG-MCNC: <0.1 KU/L
REF LAB TEST NAME: NORMAL
REF LAB TEST: NORMAL
SALMON IGE QN: <0.1 KU/L
SCALLOP IGE QN: <0.1 KU/L
SESAME SEED IGE QN: <0.1 KU/L
SHRIMP IGE QN: <0.1 KU/L
SL AMB CLIENT CONTACT: NORMAL
SOYBEAN IGE QN: <0.1 KU/L
SWEET POTATO IGE QN: <0.1 KU/L
TUNA IGE QN: <0.1 KU/L
WALNUT IGE QN: <0.1 KU/L
WHEAT IGE QN: <0.1 KU/L

## 2023-06-12 ENCOUNTER — TELEPHONE (OUTPATIENT)
Dept: PULMONOLOGY | Facility: CLINIC | Age: 53
End: 2023-06-12

## 2023-07-05 ENCOUNTER — OFFICE VISIT (OUTPATIENT)
Dept: PSYCHIATRY | Facility: CLINIC | Age: 53
End: 2023-07-05

## 2023-07-05 VITALS
SYSTOLIC BLOOD PRESSURE: 105 MMHG | HEART RATE: 59 BPM | WEIGHT: 168.4 LBS | HEIGHT: 63 IN | BODY MASS INDEX: 29.84 KG/M2 | DIASTOLIC BLOOD PRESSURE: 69 MMHG

## 2023-07-05 DIAGNOSIS — F43.10 PTSD (POST-TRAUMATIC STRESS DISORDER): ICD-10-CM

## 2023-07-05 DIAGNOSIS — F33.0 MDD (MAJOR DEPRESSIVE DISORDER), RECURRENT EPISODE, MILD (HCC): Primary | ICD-10-CM

## 2023-07-05 DIAGNOSIS — F41.1 GAD (GENERALIZED ANXIETY DISORDER): ICD-10-CM

## 2023-07-05 RX ORDER — QUETIAPINE FUMARATE 25 MG/1
TABLET, FILM COATED ORAL
Qty: 180 TABLET | Refills: 0 | Status: SHIPPED | OUTPATIENT
Start: 2023-07-05

## 2023-07-05 RX ORDER — SERTRALINE HYDROCHLORIDE 100 MG/1
200 TABLET, FILM COATED ORAL DAILY
Qty: 180 TABLET | Refills: 0 | Status: SHIPPED | OUTPATIENT
Start: 2023-07-05 | End: 2023-10-03

## 2023-07-05 NOTE — PATIENT INSTRUCTIONS
Please return for a follow up appointment as discussed. If you are running late or are unable to attend your appointment, please call our [place] office at [phone]. If you are in psychological crisis including not limited to suicidal/homicidal thoughts or thoughts of self-injury, do not hesitate to call/contact your [facility] hotline (see below) or go to the nearest emergency department.   [facility] Crisis: [address] Crisis: [address] Crisis: [address] Crisis: [address] Crisis: [address] Crisis: [address] Crisis: [phone]  National Suicide Prevention Hotline: 7-599.319.5286    Current BMI is 29.83    Recommend incorporating a more whole foods plant-predominant diet along with decreasing consumption of red meats and processed foods  Per AHA guidelines, recommend moderate-vigorous intensity exercise for 30 minutes a day for 5 days a week or a total of 150 min/week
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
## 2023-07-05 NOTE — BH TREATMENT PLAN
TREATMENT PLAN (Medication Management Only)        [address] [facility]    Name and Date of Birth:  German Ruff 46 y.o. 1970  Date of Treatment Plan: July 5, 2023  Diagnosis/Diagnoses:    1. MDD (major depressive disorder), recurrent episode, mild ([address])    2. PTSD (post-traumatic stress disorder)    3. KIP (generalized anxiety disorder)      Strengths/Personal Resources for Self-Care: supportive friends, taking medications as prescribed, ability to communicate well, ability to understand psychiatric illness, average or above intelligence, financial means, general fund of knowledge, good physical health, motivation for treatment, ability to negotiate basic needs, [employer] affiliation, special hobby/interest, stable employment, well educated, willingness to work on problems. Area/Areas of need (in own words): "I want to get my motivation level back up"  1. Long Term Goal: improve depression and improve anxiety. Target Date:6 months - 1/5/2024  Person/Persons responsible for completion of goal: Dr. Christopher Dietz LCSW  2. Short Term Objective (s) - How will we reach this goal?:   A. Provider new recommended medication/dosage changes and/or continue medication(s): continue current medications as prescribed. B. Attend psychotherapy regularly. Patient reports she follows with her therapist once every month. C. Attend medication management appointments regularly.   Target Date:6 months - 1/5/2024  Person/Persons Responsible for Completion of Goal: Dr. Christopher Dietz LCSW  Progress Towards Goals: continuing treatment, moderate progress  Treatment Modality: medication management every 3 month  Review due 180 days from date of this plan: 6 months - 1/5/2024  Expected length of service: ongoing treatment  My Physician/PA/NP and I have developed this plan together and I agree to work on the goals and objectives. I understand the treatment goals that were developed for my treatment.     Carolina Vincent MD

## 2023-07-06 ENCOUNTER — TELEPHONE (OUTPATIENT)
Dept: PSYCHIATRY | Facility: CLINIC | Age: 53
End: 2023-07-06

## 2023-07-06 NOTE — TELEPHONE ENCOUNTER
Writer SAM to reschedule 7/20 appointment due to provider being out of the office. Provider has availability on 7/14, please schedule upon return call.

## 2023-07-21 ENCOUNTER — OFFICE VISIT (OUTPATIENT)
Dept: FAMILY MEDICINE CLINIC | Facility: HOSPITAL | Age: 53
End: 2023-07-21
Payer: COMMERCIAL

## 2023-07-21 VITALS
WEIGHT: 166.8 LBS | OXYGEN SATURATION: 97 % | DIASTOLIC BLOOD PRESSURE: 80 MMHG | HEIGHT: 63 IN | HEART RATE: 93 BPM | BODY MASS INDEX: 29.55 KG/M2 | SYSTOLIC BLOOD PRESSURE: 126 MMHG

## 2023-07-21 DIAGNOSIS — J43.8 OTHER EMPHYSEMA (HCC): ICD-10-CM

## 2023-07-21 DIAGNOSIS — G89.29 CHRONIC PAIN OF LEFT ANKLE: ICD-10-CM

## 2023-07-21 DIAGNOSIS — M25.572 CHRONIC PAIN OF LEFT ANKLE: ICD-10-CM

## 2023-07-21 DIAGNOSIS — M25.562 LEFT KNEE PAIN, UNSPECIFIED CHRONICITY: ICD-10-CM

## 2023-07-21 DIAGNOSIS — Z00.00 ANNUAL PHYSICAL EXAM: Primary | ICD-10-CM

## 2023-07-21 DIAGNOSIS — F41.1 GAD (GENERALIZED ANXIETY DISORDER): ICD-10-CM

## 2023-07-21 DIAGNOSIS — E78.2 MIXED HYPERLIPIDEMIA: ICD-10-CM

## 2023-07-21 PROCEDURE — 99396 PREV VISIT EST AGE 40-64: CPT | Performed by: INTERNAL MEDICINE
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
## 2023-07-25 ENCOUNTER — TELEPHONE (OUTPATIENT)
Dept: PULMONOLOGY | Facility: CLINIC | Age: 53
End: 2023-07-25

## 2023-07-25 NOTE — TELEPHONE ENCOUNTER
LM for patient to give a call back to set up a follow up Appt in [place]  With anyone since Dr Filiberto Moreno isn't seeing patients like that .

## 2023-08-17 ENCOUNTER — TELEMEDICINE (OUTPATIENT)
Dept: BEHAVIORAL/MENTAL HEALTH CLINIC | Facility: CLINIC | Age: 53
End: 2023-08-17

## 2023-08-17 ENCOUNTER — TELEPHONE (OUTPATIENT)
Dept: PSYCHIATRY | Facility: CLINIC | Age: 53
End: 2023-08-17

## 2023-08-17 DIAGNOSIS — F41.1 GAD (GENERALIZED ANXIETY DISORDER): Primary | ICD-10-CM

## 2023-08-17 DIAGNOSIS — F41.0 PANIC ATTACKS: ICD-10-CM

## 2023-08-17 DIAGNOSIS — F43.10 PTSD (POST-TRAUMATIC STRESS DISORDER): ICD-10-CM

## 2023-08-17 DIAGNOSIS — F17.210 CIGARETTE NICOTINE DEPENDENCE WITHOUT COMPLICATION: ICD-10-CM

## 2023-08-17 PROCEDURE — 90837 PSYTX W PT 60 MINUTES: CPT | Performed by: SOCIAL WORKER

## 2023-08-17 NOTE — TELEPHONE ENCOUNTER
Writer SAM to call the office back regarding today's appointment time. Provider requested a time change. Please transfer call to writer. Thank you

## 2023-08-17 NOTE — TELEPHONE ENCOUNTER
Patient called stating her [employer] ins. hasn't kicked in yet. Patient wanted to know if she had to pay for appointment. Writer explained she would have to be a self pay. Patient explained she hasn't yet filed for her [employer] Patient has 2 options to be billed, or reschedule her appointment. Patient chose to be billed, stating COBRA will pay for it.
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
## 2023-08-17 NOTE — TELEPHONE ENCOUNTER
Writer SAM to update patient insurance prior to her 11am virtual appointment today. Please gather insurance information and send to billing or a self pay will have to be generated, patient was informed of that on the message.
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
## 2023-09-25 ENCOUNTER — TELEMEDICINE (OUTPATIENT)
Dept: BEHAVIORAL/MENTAL HEALTH CLINIC | Facility: CLINIC | Age: 53
End: 2023-09-25

## 2023-09-25 DIAGNOSIS — F33.0 MDD (MAJOR DEPRESSIVE DISORDER), RECURRENT EPISODE, MILD (HCC): ICD-10-CM

## 2023-09-25 DIAGNOSIS — F41.0 PANIC ATTACKS: ICD-10-CM

## 2023-09-25 DIAGNOSIS — F43.10 PTSD (POST-TRAUMATIC STRESS DISORDER): ICD-10-CM

## 2023-09-25 DIAGNOSIS — F17.210 CIGARETTE NICOTINE DEPENDENCE WITHOUT COMPLICATION: ICD-10-CM

## 2023-09-25 DIAGNOSIS — F41.1 GAD (GENERALIZED ANXIETY DISORDER): Primary | ICD-10-CM

## 2023-09-25 PROCEDURE — 90837 PSYTX W PT 60 MINUTES: CPT | Performed by: SOCIAL WORKER

## 2023-09-25 NOTE — PSYCH
Virtual Regular Visit    Verification of patient location:    Patient is located at Home in the following state in which I hold an active license [place]      Assessment/Plan:    Problem List Items Addressed This Visit        Other    MDD (major depressive disorder), recurrent episode, mild ([address])    PTSD (post-traumatic stress disorder)    Cigarette nicotine dependence without complication    Panic attacks    KIP (generalized anxiety disorder) - Primary       Goals addressed in session: Goal 1          Reason for visit is   Chief Complaint   Patient presents with   • Virtual Regular Visit        Encounter provider Noris Choi    Provider located at [address] [place] [id number] [phone]      Recent Visits  No visits were found meeting these conditions. Showing recent visits within past 7 days and meeting all other requirements  Today's Visits  Date Type Provider Dept   09/25/23 [facility] today's visits and meeting all other requirements  Future Appointments  No visits were found meeting these conditions. Showing future appointments within next 150 days and meeting all other requirements       The patient was identified by name and date of birth. Amilcar Pelayo was informed that this is a telemedicine visit and that the visit is being conducted through the [employer]. She agrees to proceed. .  My office door was closed. No one else was in the room. She acknowledged consent and understanding of privacy and security of the video platform. The patient has agreed to participate and understands they can discontinue the visit at any time. Patient is aware this is a billable service.          HPI     Past Medical History:   Diagnosis Date   • Asthma    • COPD (chronic obstructive pulmonary disease) ([address])    • Dysmenorrhea    • Emphysema of lung (HCC)    • Hypertension    • IBS (irritable bowel syndrome)    • Liver lesion 01/09/2018   • MDD (major depressive disorder), recurrent episode, moderate ([address]) 02/14/2018   • Menopausal symptoms    • Other emphysema ([address]) 02/23/2018   • PTSD (post-traumatic stress disorder) 02/14/2018   • Skin disorder    • Tick bite        Past Surgical History:   Procedure Laterality Date   • CHOLECYSTECTOMY     • KNEE SURGERY     • MICRODISCECTOMY LUMBAR  2003   • SPINE SURGERY     • TONSILLECTOMY  1986       Current Outpatient Medications   Medication Sig Dispense Refill   • albuterol (PROAIR HFA) 90 mcg/act inhaler Inhale 2 puffs every 6 (six) hours as needed for wheezing or shortness of breath 1 Inhaler 6   • B Complex-Folic Acid (SUPER B COMPLEX MAXI) TABS Take by mouth     • Cholecalciferol (VITAMIN D3) 1000 units CAPS Take 2 capsules by mouth daily     • fluticasone-umeclidinium-vilanterol (Trelegy Ellipta) 200-62.5-25 mcg/actuation AEPB inhaler Inhale 1 puff daily Rinse mouth after use. 180 blister 3   • Magnesium 400 MG CAPS Take 400 mg by mouth daily      • Multiple Vitamin (MULTI-VITAMIN DAILY PO) Take 1 tablet by mouth daily     • Omega-3 Fatty Acids (OMEGA-3 FISH OIL PO) Take by mouth 1 daily  (-er pt---3/6/9)     • QUEtiapine (SEROquel) 25 mg tablet Take 1/2 tablet (12.5mg) in the morning and 1 tablet (25 mg) in the evening. If you continue to experience ongoing anxiety, call the office and start taking an additional 1/2 tablet (12.5 mg) in the afternoon. 180 tablet 0   • sertraline (ZOLOFT) 100 mg tablet Take 2 tablets (200 mg total) by mouth daily 180 tablet 0     No current facility-administered medications for this visit.         Allergies   Allergen Reactions   • Hydroxyzine Anaphylaxis, Cough, Dermatitis, Drowsiness, Facial Swelling, Fatigue, Hives, Itching, Palpitations, Shortness Of Breath, Throat Swelling and Wheezing   • Levaquin [Levofloxacin] Throat Swelling   • Penicillins Throat Swelling   • Clonidine Angioedema       Review of Systems    Video Exam    There were no vitals filed for this visit. Physical Exam     Behavioral Health Psychotherapy Progress Note    Psychotherapy Provided: Individual Psychotherapy     1. KIP (generalized anxiety disorder)        2. Panic attacks        3. PTSD (post-traumatic stress disorder)        4. MDD (major depressive disorder), recurrent episode, mild ([address])        5. Cigarette nicotine dependence without complication            Goals addressed in session: Goal 1     DATA: Ronny Mcwilliams stated that she has been doing well overall. The clinician noted that her affect is brighter and that she looks to be happier. She stated that she is and feels that it is due to no longer being at her place of work. She stated that she is uncertain as to what she wants her next steps to be in terms of employment. She stated that she is not sure that wants it to be on a similar department as se felt that this was stressful. She discussed feeling as though she has not been productive although admitted that this may be due to old messages. Discussing that it is acceptable to slow down and practice self care,. Also discussing the aging process and how it affects us. Discussing her childhood and the messages that she learned in terms of expectations. Discussing her path forward and making choices based on what would make her happy in terms of her career. Also discussing the these that fear has in her life and how it affects her as well as ways for her to navigate this. Giving supportive therapy  During this session, this clinician used the following therapeutic modalities: Cognitive Behavioral Therapy    Substance Abuse was not addressed during this session. If the client is diagnosed with a co-occurring substance use disorder, please indicate any changes in the frequency or amount of use: N/A.  Stage of change for addressing substance use diagnoses: No substance use/Not applicable    ASSESSMENT:  Suzanna Hawk presents with a Euthymic/ normal mood. her affect is Normal range and intensity, which is congruent, with her mood and the content of the session. The client has made progress on their goals. Continues to understand and navigate her triggers Suzanna Hawk presents with a none risk of suicide, none risk of self-harm, and none risk of harm to others. For any risk assessment that surpasses a "low" rating, a safety plan must be developed. A safety plan was indicated: no  If yes, describe in detail N/A    PLAN: Between sessions, Suzanna Hawk will continue to navigate her triggers. At the next session, the therapist will use Cognitive Behavioral Therapy to address treatment goals. Behavioral Health Treatment Plan and Discharge Planning: Suzanna Hawk is aware of and agrees to continue to work on their treatment plan. They have identified and are working toward their discharge goals.  yes    Visit start and stop times:    09/25/23  Start Time: 0804  Stop Time: 2291  Total Visit Time: 55 minutes

## 2023-09-27 ENCOUNTER — TELEMEDICINE (OUTPATIENT)
Dept: PSYCHIATRY | Facility: CLINIC | Age: 53
End: 2023-09-27

## 2023-09-27 DIAGNOSIS — F33.0 MDD (MAJOR DEPRESSIVE DISORDER), RECURRENT EPISODE, MILD (HCC): Primary | ICD-10-CM

## 2023-09-27 DIAGNOSIS — F43.10 PTSD (POST-TRAUMATIC STRESS DISORDER): ICD-10-CM

## 2023-09-27 DIAGNOSIS — F41.1 GAD (GENERALIZED ANXIETY DISORDER): ICD-10-CM

## 2023-09-27 PROCEDURE — 99214 OFFICE O/P EST MOD 30 MIN: CPT

## 2023-09-27 RX ORDER — QUETIAPINE FUMARATE 25 MG/1
TABLET, FILM COATED ORAL
Start: 2023-09-27

## 2023-09-27 RX ORDER — SERTRALINE HYDROCHLORIDE 100 MG/1
200 TABLET, FILM COATED ORAL DAILY
Qty: 180 TABLET | Refills: 0 | Status: SHIPPED | OUTPATIENT
Start: 2023-09-27 | End: 2023-12-26
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
## 2023-09-27 NOTE — PATIENT INSTRUCTIONS
Left message to call office back.   Will get more details on the thrush status when mom returns call.   Please present for your previously scheduled appointment approximately 15 minutes prior to appointment time. If you are running late or are unable to attend your appointment, please call our [place] office at [phone] or our [place] ([place] office at [phone] if applicable to notify the office of your absence. If you are in psychological crisis including not limited to suicidal/homicidal thoughts or thoughts of self-injury, do not hesitate to call/contact your [facility] hotline (see below) or attend to the nearest emergency department.   [facility] Crisis: [address] Crisis: [phone]  [address] Crisis: [address] Crisis: [address] Crisis: [address] Crisis: [address] Crisis: [phone]  National Suicide Prevention Hotline: 7-455.479.7622

## 2023-10-12 ENCOUNTER — TELEPHONE (OUTPATIENT)
Dept: PSYCHIATRY | Facility: CLINIC | Age: 53
End: 2023-10-12

## 2023-10-12 NOTE — TELEPHONE ENCOUNTER
Patient is calling regarding cancelling an appointment.     Date/Time: 10/16/2023 3pm    Reason:     Patient was rescheduled: YES [] NO [x]  If yes, when was Patient reschedule for:     Patient requesting call back to reschedule: YES [] NO [x]

## 2023-10-12 NOTE — TELEPHONE ENCOUNTER
Left a message for patient informing her that her insurance is inactive for her virtual appointment with Jade Giron on 10/16/23. This writer requested she provide new insurance information and informed her of the self pay option, if interested. Asked patient to call the office with any questions or intent.

## 2023-11-03 DIAGNOSIS — J43.8 OTHER EMPHYSEMA (HCC): Primary | ICD-10-CM

## 2023-11-03 RX ORDER — FLUTICASONE PROPIONATE AND SALMETEROL 100; 50 UG/1; UG/1
1 POWDER RESPIRATORY (INHALATION) EVERY 12 HOURS SCHEDULED
Qty: 180 BLISTER | Refills: 0 | Status: SHIPPED | OUTPATIENT
Start: 2023-11-03 | End: 2024-02-01

## 2023-11-03 NOTE — PROGRESS NOTES
The patient sent a message about not tolerating Trelegy and feeling better back on Advair.   She requested a prescription which I sent in  We will see her in the office to see how that is working

## 2023-11-20 ENCOUNTER — TELEMEDICINE (OUTPATIENT)
Dept: PSYCHIATRY | Facility: CLINIC | Age: 53
End: 2023-11-20

## 2023-11-20 ENCOUNTER — TELEMEDICINE (OUTPATIENT)
Dept: BEHAVIORAL/MENTAL HEALTH CLINIC | Facility: CLINIC | Age: 53
End: 2023-11-20

## 2023-11-20 DIAGNOSIS — F43.10 PTSD (POST-TRAUMATIC STRESS DISORDER): ICD-10-CM

## 2023-11-20 DIAGNOSIS — F32.5 MAJOR DEPRESSIVE DISORDER IN FULL REMISSION, UNSPECIFIED WHETHER RECURRENT (HCC): Primary | ICD-10-CM

## 2023-11-20 DIAGNOSIS — F41.1 GAD (GENERALIZED ANXIETY DISORDER): Primary | ICD-10-CM

## 2023-11-20 DIAGNOSIS — F17.210 CIGARETTE NICOTINE DEPENDENCE WITHOUT COMPLICATION: ICD-10-CM

## 2023-11-20 DIAGNOSIS — Z13.228 SCREENING FOR METABOLIC DISORDER: ICD-10-CM

## 2023-11-20 DIAGNOSIS — F33.0 MDD (MAJOR DEPRESSIVE DISORDER), RECURRENT EPISODE, MILD (HCC): ICD-10-CM

## 2023-11-20 DIAGNOSIS — F41.1 GAD (GENERALIZED ANXIETY DISORDER): ICD-10-CM

## 2023-11-20 DIAGNOSIS — F41.0 PANIC ATTACKS: ICD-10-CM

## 2023-11-20 PROCEDURE — 99214 OFFICE O/P EST MOD 30 MIN: CPT

## 2023-11-20 PROCEDURE — 90837 PSYTX W PT 60 MINUTES: CPT | Performed by: SOCIAL WORKER

## 2023-11-20 RX ORDER — QUETIAPINE FUMARATE 25 MG/1
TABLET, FILM COATED ORAL
Qty: 135 TABLET | Refills: 1 | Status: SHIPPED | OUTPATIENT
Start: 2023-11-20

## 2023-11-20 RX ORDER — SERTRALINE HYDROCHLORIDE 100 MG/1
200 TABLET, FILM COATED ORAL DAILY
Qty: 180 TABLET | Refills: 1 | Status: SHIPPED | OUTPATIENT
Start: 2023-11-20 | End: 2024-05-18

## 2023-11-20 NOTE — PSYCH
Virtual Regular Visit    Verification of patient location:    Patient is located at Home in the following state in which I hold an active license [place]      Assessment/Plan:    Problem List Items Addressed This Visit          Other    MDD (major depressive disorder), recurrent episode, mild ([address])    PTSD (post-traumatic stress disorder)    Cigarette nicotine dependence without complication    Panic attacks    KIP (generalized anxiety disorder) - Primary       Goals addressed in session: Goal 1          Reason for visit is   Chief Complaint   Patient presents with    Virtual Regular Visit          Encounter provider Isaura Mayfield    Provider located at [address] [place] [address]  [address] [address] [phone]      Recent Visits  No visits were found meeting these conditions. Showing recent visits within past 7 days and meeting all other requirements  Today's Visits  Date Type Provider Dept   11/20/23 [address] today's visits and meeting all other requirements  Future Appointments  No visits were found meeting these conditions. Showing future appointments within next 150 days and meeting all other requirements       The patient was identified by name and date of birth. Chayobill Adithya was informed that this is a telemedicine visit and that the visit is being conducted through the [employer]. She agrees to proceed. .  My office door was closed. No one else was in the room. She acknowledged consent and understanding of privacy and security of the video platform. The patient has agreed to participate and understands they can discontinue the visit at any time. Patient is aware this is a billable service.        HPI     Past Medical History:   Diagnosis Date    Asthma     COPD (chronic obstructive pulmonary disease) ([address])     Dysmenorrhea Emphysema of lung ([address])     Hypertension     IBS (irritable bowel syndrome)     Liver lesion 01/09/2018    MDD (major depressive disorder), recurrent episode, moderate (HCC) 02/14/2018    Menopausal symptoms     Other emphysema ([address]) 02/23/2018    PTSD (post-traumatic stress disorder) 02/14/2018    Skin disorder     Tick bite        Past Surgical History:   Procedure Laterality Date    CHOLECYSTECTOMY      KNEE SURGERY      MICRODISCECTOMY LUMBAR  2003    SPINE SURGERY      TONSILLECTOMY  1986       Current Outpatient Medications   Medication Sig Dispense Refill    albuterol (PROAIR HFA) 90 mcg/act inhaler Inhale 2 puffs every 6 (six) hours as needed for wheezing or shortness of breath 1 Inhaler 6    B Complex-Folic Acid (SUPER B COMPLEX MAXI) TABS Take by mouth      Cholecalciferol (VITAMIN D3) 1000 units CAPS Take 2 capsules by mouth daily      Fluticasone-Salmeterol (Advair) 100-50 mcg/dose inhaler Inhale 1 puff every 12 (twelve) hours 180 blister 0    Magnesium 400 MG CAPS Take 400 mg by mouth daily       Multiple Vitamin (MULTI-VITAMIN DAILY PO) Take 1 tablet by mouth daily      Omega-3 Fatty Acids (OMEGA-3 FISH OIL PO) Take by mouth 1 daily  (-er pt---3/6/9)      QUEtiapine (SEROquel) 25 mg tablet Take 1 tablet (25 mg) in the evening scheduled. Take 1/2 tablet (12.5 mg) daily as needed for anxiety. sertraline (ZOLOFT) 100 mg tablet Take 2 tablets (200 mg total) by mouth daily 180 tablet 0     No current facility-administered medications for this visit. Allergies   Allergen Reactions    Hydroxyzine Anaphylaxis, Cough, Dermatitis, Drowsiness, Facial Swelling, Fatigue, Hives, Itching, Palpitations, Shortness Of Breath, Throat Swelling and Wheezing    Levaquin [Levofloxacin] Throat Swelling    Penicillins Throat Swelling    Clonidine Angioedema       Review of Systems    Video Exam    There were no vitals filed for this visit.     Physical Exam   Behavioral Health Psychotherapy Progress Note    Psychotherapy Provided: Individual Psychotherapy     1. KIP (generalized anxiety disorder)        2. MDD (major depressive disorder), recurrent episode, mild ([address])        3. PTSD (post-traumatic stress disorder)        4. Panic attacks        5. Cigarette nicotine dependence without complication            Goals addressed in session: Goal 1     DATA: Jose A Marie stated that she has been doing well overall. She stated that she is now starting to review her resume and is considering what type pf position she is interested in seeking. She stated that she is starting to feel more motivation and has been busy taking care of things around her house. During this session, this clinician used the following therapeutic modalities: Cognitive Behavioral Therapy    Substance Abuse was not addressed during this session. If the client is diagnosed with a co-occurring substance use disorder, please indicate any changes in the frequency or amount of use: N/A. Stage of change for addressing substance use diagnoses: No substance use/Not applicable    ASSESSMENT:  Reji Henry presents with a Euthymic/ normal mood. her affect is Normal range and intensity, which is congruent, with her mood and the content of the session. The client has made progress on their goals. Continues to set and maintain boundaries with family members Reji Henry presents with a none risk of suicide, none risk of self-harm, and none risk of harm to others. For any risk assessment that surpasses a "low" rating, a safety plan must be developed. A safety plan was indicated: no  If yes, describe in detail N/A    PLAN: Between sessions, Reji Henry will continue to set and maintain boundaries with family members. At the next session, the therapist will use Cognitive Behavioral Therapy to address treatment goals.     Behavioral Health Treatment Plan and Discharge Planning: Reji Henry is aware of and agrees to continue to work on their treatment plan. They have identified and are working toward their discharge goals.  yes    Visit start and stop times:    11/20/23  Start Time: 1103  Stop Time: 1159  Total Visit Time: 56 minutes yes

## 2023-11-20 NOTE — BH TREATMENT PLAN
TREATMENT PLAN (Medication Management Only)        [address] [facility]    Name and Date of Birth:  Tio Calderon 46 y.o. 1970  Date of Treatment Plan: November 20, 2023  Diagnosis/Diagnoses:    1. Major depressive disorder in full remission, unspecified whether recurrent ([address])    2. PTSD (post-traumatic stress disorder)    3. KIP (generalized anxiety disorder)      Strengths/Personal Resources for Self-Care: Supportive friends, taking medications as prescribed, ability to communicate well, ability to understand psychiatric illness, average or above average intelligence, financial means, general fund of knowledge, good physical health, motivation for treatment, ability to negotiate basic needs, [employer] affiliation, special hobby/interest, well educated, willing to work on problems. Area/Areas of need (in own words): "I have been feeling very good and I want to stay stable"  1. Long Term Goal: Maintain control of symptoms of depression and anxiety. Target Date:6 months - 5/20/2024  Person/Persons responsible for completion of goal: Dr. Sun Sauceda  2. Short Term Objective (s) - How will we reach this goal?:   Provider new recommended medication/dosage changes and/or continue medication(s): Continue current psychiatric medications as prescribed (Zoloft 200 mg daily and Seroquel 25 mg to 37.5 mg at bedtime for anxiety and insomnia).   Take medications as prescribed  Attend psychiatry appointments regularly  Continue psychotherapy regularly  Practice coping skills  Avoid alcohol   Avoid drugs   Eat a healthy diet   Exercise regularly  Target Date:6 months - 5/20/2024  Person/Persons Responsible for Completion of Goal: Valentino Parsons  Progress Towards Goals: stable, continuing treatment  Treatment Modality: Medication management every 6 months  Review due 180 days from date of this plan: 6 months - 5/20/2024  Expected length of service: ongoing treatment  My Physician/PA/NP and I have developed this plan together and I agree to work on the goals and objectives. I understand the treatment goals that were developed for my treatment. This treatment plan was created by Donzell Primrose and Dr. Toy Turcios. It was agreed to verbally. At the conclusion of the office visit it was sent to the patient's MyChart for formal signature purposes.     Joanne Quan MD

## 2023-11-20 NOTE — PATIENT INSTRUCTIONS
Please present for your previously scheduled appointment approximately 15 minutes prior to appointment time. If you are running late or are unable to attend your appointment, please call our [facility] office at [phone] or our [facility] office at [phone] if applicable to notify the office of your absence. If you are in psychological crisis including not limited to suicidal/homicidal thoughts or thoughts of self-injury, do not hesitate to call/contact your [facility] hotline (see below) or attend to the nearest emergency department.   [facility] Crisis: [address] Crisis: [phone]  [address] Crisis: [address] Crisis: [address] Crisis: [address] Crisis: [address] Crisis: [phone]  National Suicide Prevention Hotline: 1-557.992.8034

## 2023-11-20 NOTE — PSYCH
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 patient is amenable to the recommendations of this writer including: continue psychotropic medications as prescribed. At this time, Sherrill Brady has shown consistent improvements in her symptoms of depression and anxiety and at this time her symptoms are in remission. She will continue to follow with the outpatient psychiatric clinic every 6 months in light of this stability. Treatment Recommendations/Precautions:     No medication changes at this time    Continue Zoloft 200 mg daily for symptoms of depression and anxiety   PARQ completed including serotonin syndrome, SIADH, worsening depression, suicidality, induction of ave, GI upset, headaches, activation, sexual side effects, sedation, potential drug interactions, and others. Continue Seroquel 25 mg at bedtime and 12.5 mg daily as needed for anxiety and insomnia  PARQ was completed for second generation antipsychotic medication including sedation, GI distress, dizziness, risk of metabolic syndrome, EPS (akathisia, TD, etc), rare NMS, orthostatic hypotension, cardiovascular risks such as QT prolongation, increased prolactin, and others.     In light of the patient's symptom stability, 180-day supply of the aforementioned medications (a 90-day supply with 1 refill) was sent to the patient's preferred pharmacies  Continue ongoing medication management every 6 months and continue to follow with family medicine as well as pulmonology for aforementioned medical conditions  Continue psychotherapy with Artie salas monthly basis    Patient was reminded she has outstanding bloodwork from her PCP including a CBC, CMP, and Lipid Panel. A HGA1c was ordered for screening for metabolic disorders. Aware of need to follow up with family physician for glucose and lipid monitoring due to current therapy with antipsychotic medication  Aware of 24 hour and weekend coverage for urgent situations accessed by calling [facility] main practice number    Although patient's acute lethality risk is low, long-term/chronic lethality risk is mildly elevated in the presence of minimal symptoms of depression and anxiety. At the current moment, Tree Beasley is future-oriented, forward-thinking, and demonstrates ability to act in a self-preserving manner as evidenced by volitionally presenting to the clinic today, seeking treatment. At this juncture, inpatient hospitalization is not currently warranted. To mitigate future risk, patient should adhere to the recommendations of this writer, avoid alcohol/illicit substance use, utilize community-based resources and familiar support and prioritize mental health treatment. Based on today's assessment and clinical criteria, [facility] contracts for safety and is not an imminent risk of harm to self or others. Outpatient level of care is deemed appropriate at this present time. Tree Beasley understands that if they are no longer able to contract for safety, they need to call/contact the outpatient office including this writer, call/contact crisis and/orattend to the nearest Emergency Department for immediate evaluation. Risks/Benefits      Risks, Benefits And Possible Side Effects Of Medications:    Risks, benefits, and possible side effects of medications explained to Tree Beasley and she verbalizes understanding and agreement for treatment.     Controlled Medication Discussion:     Not applicable - controlled prescriptions are not prescribed by this practice    Psychotherapy Provided:     Individual psychotherapy provided: Yes  Goals discussed during in session including plans to return to work  Supportive therapy provided. Treatment Plan:    Completed and signed during the session: Yes - Treatment Plan done but not signed at time of office visit due to:  Plan reviewed by video and verbal consent given due to virtual visit. Treatment Plan sent to patient via [employer] for signature. Visit Time    Visit Start Time: 3:00 PM  Visit Stop Time: 3:45 PM  Total Visit Duration:  45 minutes    This note was shared with patient. Mayuri Chavez MD 11/20/23    This note has been constructed using a voice recognition system. There may be translation, syntax, or grammatical errors. If you have any questions, please contact the dictating provider.

## 2024-01-23 ENCOUNTER — OFFICE VISIT (OUTPATIENT)
Dept: FAMILY MEDICINE CLINIC | Facility: HOSPITAL | Age: 54
End: 2024-01-23

## 2024-01-23 VITALS
DIASTOLIC BLOOD PRESSURE: 78 MMHG | WEIGHT: 167.4 LBS | HEIGHT: 63 IN | BODY MASS INDEX: 29.66 KG/M2 | OXYGEN SATURATION: 96 % | SYSTOLIC BLOOD PRESSURE: 132 MMHG | HEART RATE: 92 BPM

## 2024-01-23 DIAGNOSIS — R51.9 BILATERAL HEADACHES: ICD-10-CM

## 2024-01-23 DIAGNOSIS — R41.840 IMPAIRED CONCENTRATION: ICD-10-CM

## 2024-01-23 DIAGNOSIS — E66.3 OVERWEIGHT (BMI 25.0-29.9): ICD-10-CM

## 2024-01-23 DIAGNOSIS — R55 VASOMOTOR INSTABILITY: ICD-10-CM

## 2024-01-23 DIAGNOSIS — F33.0 MDD (MAJOR DEPRESSIVE DISORDER), RECURRENT EPISODE, MILD (HCC): ICD-10-CM

## 2024-01-23 DIAGNOSIS — F41.1 GAD (GENERALIZED ANXIETY DISORDER): ICD-10-CM

## 2024-01-23 DIAGNOSIS — J43.8 OTHER EMPHYSEMA (HCC): Primary | ICD-10-CM

## 2024-01-23 DIAGNOSIS — R25.1 TREMOR OF BOTH HANDS: ICD-10-CM

## 2024-01-23 DIAGNOSIS — Z72.0 TOBACCO ABUSE: ICD-10-CM

## 2024-01-23 PROCEDURE — 99215 OFFICE O/P EST HI 40 MIN: CPT | Performed by: INTERNAL MEDICINE

## 2024-01-23 RX ORDER — FLUTICASONE FUROATE, UMECLIDINIUM BROMIDE AND VILANTEROL TRIFENATATE 200; 62.5; 25 UG/1; UG/1; UG/1
1 POWDER RESPIRATORY (INHALATION) DAILY
COMMUNITY

## 2024-01-23 NOTE — ASSESSMENT & PLAN NOTE
Has some hand tremors at times and at times has twitches with sleeping and restless legs at times   She is concerned as her father has recent dx with Parkinsonism

## 2024-01-23 NOTE — ASSESSMENT & PLAN NOTE
Stopped menses at age 44   Ongoing sweats night  times and will sweat with doing activity  like dishes

## 2024-01-23 NOTE — PROGRESS NOTES
Assessment/Plan:     Diagnosis ICD-10-CM Associated Orders   1. Emphysema (HCC)  J43.8       2. KIP (generalized anxiety disorder)  F41.1       3. MDD (major depressive disorder), recurrent episode, mild (HCC)  F33.0       4. Bilateral headaches  R51.9 Ambulatory Referral to Neurology     MRI brain w wo contrast     Vitamin B12     Vitamin D 25 hydroxy     Folate     TSH, 3rd generation with Free T4 reflex     Vitamin B12     Vitamin D 25 hydroxy     Folate     TSH, 3rd generation with Free T4 reflex      5. Tremor of both hands  R25.1 Ambulatory Referral to Neurology     MRI brain w wo contrast     Vitamin B12     Vitamin D 25 hydroxy     Folate     TSH, 3rd generation with Free T4 reflex     Vitamin B12     Vitamin D 25 hydroxy     Folate     TSH, 3rd generation with Free T4 reflex      6. Vasomotor instability  R55       7. Impaired concentration  R41.840 Ambulatory Referral to Neuropsychology     MRI brain w wo contrast     Vitamin B12     Vitamin D 25 hydroxy     Folate     TSH, 3rd generation with Free T4 reflex     Vitamin B12     Vitamin D 25 hydroxy     Folate     TSH, 3rd generation with Free T4 reflex          Problem List Items Addressed This Visit          Respiratory    Emphysema (HCC) - Primary     Trelegy ellipta ( causes some coughing at times)- did switch to breo and was worse- went back to trelegy  and albuterol prn         Relevant Medications    fluticasone-umeclidinium-vilanterol (Trelegy Ellipta) 200-62.5-25 mcg/actuation AEPB inhaler       Other    MDD (major depressive disorder), recurrent episode, mild (HCC)     On seroquel and sertraline - being seen by pyschiatry         KIP (generalized anxiety disorder)     Feels overwhelmed at work and has concentration difficulties  and trouble learning new things.   Had struggled in school- a-c in school- did prevet in college- passed   Now feels like she had to read things a few times to comprehend         Bilateral headaches     Has intermitent  pain  in side of head and then may be into back of neck-? Fany Knight had hx of migraines  Also she is concerned that h she had lived at [facility] when her father was in service- he was recently diagnosed with Parkisnonism         Relevant Orders    Ambulatory Referral to Neurology    MRI brain w wo contrast    Vitamin B12    Vitamin D 25 hydroxy    Folate    TSH, 3rd generation with Free T4 reflex    Tremor of both hands     Has some hand tremors at times and at times has twitches with sleeping and restless legs at times   She is concerned as her father has recent dx with Parkinsonism         Relevant Orders    Ambulatory Referral to Neurology    MRI brain w wo contrast    Vitamin B12    Vitamin D 25 hydroxy    Folate    TSH, 3rd generation with Free T4 reflex    Vasomotor instability     Stopped menses at age 44   Ongoing sweats night  times and will sweat with doing activity  like dishes          Other Visit Diagnoses       Impaired concentration        Relevant Orders    Ambulatory Referral to Neuropsychology    MRI brain w wo contrast    Vitamin B12    Vitamin D 25 hydroxy    Folate    TSH, 3rd generation with Free T4 reflex              No follow-ups on file.      Subjective:    Patient ID: Janna Kenney is a 53 y.o. female    Has not worked since July 14, 2023- had concentration difficulties and  severe anxiety- some tiredness with seroquel   Has no insurance currently  Has occasional dizziness and headaches which is upsetting to her as well as concentration difficulties   I have ordered additional labs        The following portions of the patient's history were reviewed and updated as appropriate: allergies, current medications and problem list.     Review of Systems   HENT:  Negative for congestion.    Respiratory:  Positive for cough. Negative for wheezing.    Neurological:  Positive for dizziness and headaches.   Psychiatric/Behavioral:  Positive for decreased concentration.          "Mother and brother were bipolar and brother was aggressive- has PTSD   All other systems reviewed and are negative.        Objective:      Current Outpatient Medications:     albuterol (PROAIR HFA) 90 mcg/act inhaler, Inhale 2 puffs every 6 (six) hours as needed for wheezing or shortness of breath, Disp: 1 Inhaler, Rfl: 6    B Complex-Folic Acid (SUPER B COMPLEX MAXI) TABS, Take by mouth, Disp: , Rfl:     Cholecalciferol (VITAMIN D3) 1000 units CAPS, Take 2 capsules by mouth daily, Disp: , Rfl:     fluticasone-umeclidinium-vilanterol (Trelegy Ellipta) 200-62.5-25 mcg/actuation AEPB inhaler, Inhale 1 puff daily Rinse mouth after use., Disp: , Rfl:     Magnesium 400 MG CAPS, Take 400 mg by mouth daily , Disp: , Rfl:     Multiple Vitamin (MULTI-VITAMIN DAILY PO), Take 1 tablet by mouth daily, Disp: , Rfl:     Omega-3 Fatty Acids (OMEGA-3 FISH OIL PO), Take by mouth 1 daily  (-er pt---3/6/9), Disp: , Rfl:     QUEtiapine (SEROquel) 25 mg tablet, Take 1 tablet (25 mg) in the evening scheduled. Take 1/2 tablet (12.5 mg) daily as needed for anxiety., Disp: 135 tablet, Rfl: 1    sertraline (ZOLOFT) 100 mg tablet, Take 2 tablets (200 mg total) by mouth daily, Disp: 180 tablet, Rfl: 1    Blood pressure 132/78, pulse 92, height 5' 3\" (1.6 m), weight 75.9 kg (167 lb 6.4 oz), SpO2 96%, not currently breastfeeding.     Physical Exam  Vitals and nursing note reviewed.   Constitutional:       General: She is not in acute distress.  HENT:      Head: Normocephalic and atraumatic.      Nose: Congestion present.   Eyes:      General:         Right eye: No discharge.         Left eye: No discharge.   Cardiovascular:      Rate and Rhythm: Normal rate and regular rhythm.      Heart sounds: No murmur heard.  Pulmonary:      Breath sounds: No wheezing or rhonchi.   Abdominal:      General: There is no distension.      Palpations: Abdomen is soft.      Tenderness: There is no abdominal tenderness.   Musculoskeletal:         General: No " swelling or tenderness.      Cervical back: No tenderness.      Right lower leg: No edema.      Left lower leg: No edema.   Lymphadenopathy:      Cervical: No cervical adenopathy.   Skin:     Findings: No erythema or rash.   Neurological:      Mental Status: She is alert.   Psychiatric:      Comments: Some pressure speech   Mild hand tremor       I have spent a total time of 48 minutes on 01/23/24 in caring for this patient including Diagnostic results, Prognosis, Risks and benefits of tx options, Documenting in the medical record, and Reviewing / ordering tests, medicine, procedures  .

## 2024-01-23 NOTE — ASSESSMENT & PLAN NOTE
Has intermitent pain  in side of head and then may be into back of neck-? Cluster   Antonia had hx of migraines  Also she is concerned that h she had lived at [facility] when her father was in service- he was recently diagnosed with Parkisnonism

## 2024-01-23 NOTE — ASSESSMENT & PLAN NOTE
Trelegy ellipta ( causes some coughing at times)- did switch to breo and was worse- went back to trelegy  and albuterol prn

## 2024-01-23 NOTE — ASSESSMENT & PLAN NOTE
Feels overwhelmed at work and has concentration difficulties  and trouble learning new things.   Had struggled in school- a-c in school- did prevet in college- passed   Now feels like she had to read things a few times to comprehend

## 2024-01-29 DIAGNOSIS — Z77.120 SUSPECTED EXPOSURE TO MOLD: ICD-10-CM

## 2024-01-29 DIAGNOSIS — L50.9 URTICARIA: Primary | ICD-10-CM

## 2024-01-30 ENCOUNTER — TELEMEDICINE (OUTPATIENT)
Dept: BEHAVIORAL/MENTAL HEALTH CLINIC | Facility: CLINIC | Age: 54
End: 2024-01-30

## 2024-01-30 DIAGNOSIS — F41.0 PANIC ATTACKS: ICD-10-CM

## 2024-01-30 DIAGNOSIS — F17.210 CIGARETTE NICOTINE DEPENDENCE WITHOUT COMPLICATION: ICD-10-CM

## 2024-01-30 DIAGNOSIS — F41.1 GAD (GENERALIZED ANXIETY DISORDER): ICD-10-CM

## 2024-01-30 DIAGNOSIS — F43.10 PTSD (POST-TRAUMATIC STRESS DISORDER): ICD-10-CM

## 2024-01-30 DIAGNOSIS — F33.0 MDD (MAJOR DEPRESSIVE DISORDER), RECURRENT EPISODE, MILD (HCC): Primary | ICD-10-CM

## 2024-01-30 PROCEDURE — 90834 PSYTX W PT 45 MINUTES: CPT | Performed by: SOCIAL WORKER
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
## 2024-01-30 NOTE — PSYCH
Virtual Regular Visit    Verification of patient location:    Patient is located at Home in the following state in which I hold an active license [place]      Assessment/Plan:    Problem List Items Addressed This Visit          Other    MDD (major depressive disorder), recurrent episode, mild (HCC) - Primary    PTSD (post-traumatic stress disorder)    Cigarette nicotine dependence without complication    Panic attacks    KIP (generalized anxiety disorder)       Goals addressed in session: Goal 1          Reason for visit is   Chief Complaint   Patient presents with    Virtual Regular Visit          Encounter provider Corazon Holbrook    Provider located at [facility] [place]  [facility] [place]  [address]  [place] [id number] [phone]      Recent Visits  Date Type Provider Dept   01/23/24 Office Visit DO [facility]   Showing recent visits within past 7 days and meeting all other requirements  Today's Visits  Date Type Provider Dept   01/30/24 Telemedicine Corazon Holbrook [facility] [place]   Showing today's visits and meeting all other requirements  Future Appointments  No visits were found meeting these conditions.  Showing future appointments within next 150 days and meeting all other requirements       The patient was identified by name and date of birth. Janna Kenney was informed that this is a telemedicine visit and that the visit is being conducted through the Epic Embedded platform. She agrees to proceed..  My office door was closed. No one else was in the room.  She acknowledged consent and understanding of privacy and security of the video platform. The patient has agreed to participate and understands they can discontinue the visit at any time.    Patient is aware this is a billable service.         HPI     Past Medical History:   Diagnosis Date    Asthma     COPD (chronic  obstructive pulmonary disease) (HCC)     Dysmenorrhea     Emphysema of lung (HCC)     Hypertension     IBS (irritable bowel syndrome)     Liver lesion 01/09/2018    MDD (major depressive disorder), recurrent episode, moderate (HCC) 02/14/2018    Menopausal symptoms     Other emphysema (HCC) 02/23/2018    PTSD (post-traumatic stress disorder) 02/14/2018    Skin disorder     Tick bite        Past Surgical History:   Procedure Laterality Date    CHOLECYSTECTOMY      KNEE SURGERY      MICRODISCECTOMY LUMBAR  2003    SPINE SURGERY      TONSILLECTOMY  1986       Current Outpatient Medications   Medication Sig Dispense Refill    albuterol (PROAIR HFA) 90 mcg/act inhaler Inhale 2 puffs every 6 (six) hours as needed for wheezing or shortness of breath 1 Inhaler 6    B Complex-Folic Acid (SUPER B COMPLEX MAXI) TABS Take by mouth      Cholecalciferol (VITAMIN D3) 1000 units CAPS Take 2 capsules by mouth daily      fluticasone-umeclidinium-vilanterol (Trelegy Ellipta) 200-62.5-25 mcg/actuation AEPB inhaler Inhale 1 puff daily Rinse mouth after use.      Magnesium 400 MG CAPS Take 400 mg by mouth daily       Multiple Vitamin (MULTI-VITAMIN DAILY PO) Take 1 tablet by mouth daily      Omega-3 Fatty Acids (OMEGA-3 FISH OIL PO) Take by mouth 1 daily  (-er pt---3/6/9)      QUEtiapine (SEROquel) 25 mg tablet Take 1 tablet (25 mg) in the evening scheduled. Take 1/2 tablet (12.5 mg) daily as needed for anxiety. 135 tablet 1    sertraline (ZOLOFT) 100 mg tablet Take 2 tablets (200 mg total) by mouth daily 180 tablet 1     No current facility-administered medications for this visit.        Allergies   Allergen Reactions    Hydroxyzine Anaphylaxis, Cough, Dermatitis, Drowsiness, Facial Swelling, Fatigue, Hives, Itching, Palpitations, Shortness Of Breath, Throat Swelling and Wheezing    Levaquin [Levofloxacin] Throat Swelling    Penicillins Throat Swelling    Clonidine Angioedema       Review of Systems    Video Exam    There were no vitals  filed for this visit.    Physical Exam     Behavioral Health Psychotherapy Progress Note    Psychotherapy Provided: Individual Psychotherapy     1. MDD (major depressive disorder), recurrent episode, mild (HCC)        2. PTSD (post-traumatic stress disorder)        3. Panic attacks        4. KIP (generalized anxiety disorder)        5. Cigarette nicotine dependence without complication            Goals addressed in session: Goal 1     DATA: Ami stated that she has been feeling physically ill. She stated that she had the realization that what she had experienced in her last position di not have any relation to depression. She stated that they discovered that her father has Parkinson's disease and that she feared the same for herself due to ongoing symptoms. She stated that hey had resided [place] [employer] where they were exposed to the contaminated water. She stated that she is being tested for this but also has an ongoing mold issue in her home and she is highly allergic. She stated that she takes on water from the [facility] property next to hers and that they will not fix the situation. She stated that she is in the process of hiring  and has taken steps to decrease the mold in her home. Processing her emotions and giving supportive therapy  During this session, this clinician used the following therapeutic modalities: Cognitive Behavioral Therapy, Solution-Focused Therapy, and Supportive Psychotherapy    Substance Abuse was not addressed during this session. If the client is diagnosed with a co-occurring substance use disorder, please indicate any changes in the frequency or amount of use: N/A. Stage of change for addressing substance use diagnoses: No substance use/Not applicable    ASSESSMENT:  Ami Kenney presents with a Angry mood.     her affect is Normal range and intensity, which is congruent, with her mood and the content of the session. The client has made progress on their  "goals.    Continues to process her emotions Ami Kenney presents with a none risk of suicide, none risk of self-harm, and none risk of harm to others.    For any risk assessment that surpasses a \"low\" rating, a safety plan must be developed.    A safety plan was indicated: no  If yes, describe in detail N/A    PLAN: Between sessions, Ami Kenney will continue to process her emotions. At the next session, the therapist will use Cognitive Behavioral Therapy, Solution-Focused Therapy, and Supportive Psychotherapy to address treatment goals.    Behavioral Health Treatment Plan and Discharge Planning: Ami Kenney is aware of and agrees to continue to work on their treatment plan. They have identified and are working toward their discharge goals. yes    Visit start and stop times:    01/30/24  Start Time: 1012  Stop Time: 1059  Total Visit Time: 47 minutes      "

## 2024-02-01 ENCOUNTER — TELEPHONE (OUTPATIENT)
Dept: PSYCHIATRY | Facility: CLINIC | Age: 54
End: 2024-02-01

## 2024-02-01 NOTE — TELEPHONE ENCOUNTER
Patient returned call, writer explained that patient needs a referral from [facility] neurology, patient verbalized understanding

## 2024-02-01 NOTE — TELEPHONE ENCOUNTER
Reached out to patient in regards to Neuropsychology referral Left VM for pt to contact intake department.     When pt calls back will need to be informed referral would need to come from [facility]'s Neuro Dept.

## 2024-02-20 ENCOUNTER — TELEPHONE (OUTPATIENT)
Dept: OTHER | Facility: OTHER | Age: 54
End: 2024-02-20

## 2024-02-20 ENCOUNTER — TELEPHONE (OUTPATIENT)
Dept: PSYCHIATRY | Facility: CLINIC | Age: 54
End: 2024-02-20

## 2024-02-20 NOTE — TELEPHONE ENCOUNTER
Patient is calling regarding cancelling an appointment.    Date/Time:2/21/2024/ 10:00 AM    Patient was rescheduled: YES [] NO [x]    Patient requesting call back to reschedule: YES [] NO [x]

## 2024-03-13 ENCOUNTER — TELEPHONE (OUTPATIENT)
Dept: PSYCHIATRY | Facility: CLINIC | Age: 54
End: 2024-03-13

## 2024-03-13 NOTE — TELEPHONE ENCOUNTER
Patient is calling regarding cancelling an appointment.    Date/Time: 3/14/2024 2pm    Reason: patient stated she will reschedule as soon as she has new insurance    Patient was rescheduled: YES [] NO [x]  If yes, when was Patient reschedule for:     Patient requesting call back to reschedule: YES [] NO [x]

## 2024-04-11 ENCOUNTER — TELEPHONE (OUTPATIENT)
Dept: PULMONOLOGY | Facility: CLINIC | Age: 54
End: 2024-04-11

## 2024-04-11 DIAGNOSIS — J43.8 OTHER EMPHYSEMA (HCC): Primary | ICD-10-CM

## 2024-04-12 RX ORDER — FLUTICASONE FUROATE, UMECLIDINIUM BROMIDE AND VILANTEROL TRIFENATATE 200; 62.5; 25 UG/1; UG/1; UG/1
1 POWDER RESPIRATORY (INHALATION) DAILY
Qty: 60 BLISTER | Refills: 5 | Status: SHIPPED | OUTPATIENT
Start: 2024-04-12

## 2024-04-25 ENCOUNTER — TELEPHONE (OUTPATIENT)
Dept: PULMONOLOGY | Facility: CLINIC | Age: 54
End: 2024-04-25

## 2024-04-25 NOTE — TELEPHONE ENCOUNTER
The insurance that we and the pharmacy has is termed. They have reached out and LM for pt to call with updated insurance information. We will need that as well. Message left for pt

## 2024-05-09 ENCOUNTER — TELEMEDICINE (OUTPATIENT)
Dept: PSYCHIATRY | Facility: CLINIC | Age: 54
End: 2024-05-09

## 2024-05-09 DIAGNOSIS — F33.0 MILD EPISODE OF RECURRENT MAJOR DEPRESSIVE DISORDER (HCC): Primary | ICD-10-CM

## 2024-05-09 DIAGNOSIS — M62.838 MUSCLE SPASMS OF LOWER EXTREMITY: ICD-10-CM

## 2024-05-09 DIAGNOSIS — F41.1 GAD (GENERALIZED ANXIETY DISORDER): ICD-10-CM

## 2024-05-09 PROCEDURE — 99214 OFFICE O/P EST MOD 30 MIN: CPT

## 2024-05-09 RX ORDER — SERTRALINE HYDROCHLORIDE 100 MG/1
200 TABLET, FILM COATED ORAL DAILY
Qty: 180 TABLET | Refills: 1 | Status: SHIPPED | OUTPATIENT
Start: 2024-05-09 | End: 2024-11-05

## 2024-05-09 RX ORDER — TIZANIDINE 4 MG/1
4 TABLET ORAL 3 TIMES DAILY
Qty: 270 TABLET | Refills: 1 | Status: SHIPPED | OUTPATIENT
Start: 2024-05-09 | End: 2024-05-09

## 2024-05-09 RX ORDER — TIZANIDINE 4 MG/1
4 TABLET ORAL 3 TIMES DAILY PRN
Qty: 270 TABLET | Refills: 1 | Status: SHIPPED | OUTPATIENT
Start: 2024-05-09

## 2024-05-09 NOTE — PSYCH
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 occasional bilateral resting hand tremors. At the time of virtual interview, no termor was observed (exam was limited due to nature of virtual visit). From this description, these tremors are more suggestive of an essential tremor. These symptoms will continue to be monitored as the patient's treatment progresses. Ami reports that lack of insurance also remains a stressor for her and she is planning to purchase healthcare insurance soon as well. Today, Ami currently reports mild symptoms of depression and she scores a 4 on the PHQ-9 (increased from previous score of 2). Ami currently reports minimal symptoms of anxiety and scores a 2 on the KIP-7 (increased from previous score of 1). Please see below for detailed score report. Ami adamantly denies suicidal ideation, homicidal ideation, plan or intent to harm herself or others. Medication management options were discussed with Ami. She has been taking her psychiatric medications (Zoloft 200 mg daily, Seroquel 25 mg at bedtime and 12.5 mg daily as  needed for increased anxiety) as prescribed and denies side effects. The topic of antipsychotic medication exacerbating movement disorder conditions was discussed in detail. Following a thorough discussion, Ami agreed to discontinue Seroquel and to start tizanidine 4 mg three times daily as needed for muscle spasms, anxiety, and insomnia.    Treatment Recommendations/Precautions:     Discontinued Seroquel due to concerns for movement disorder as well as to minimize metabolic side effects    Started tizanidine 4 mg three times daily as needed for muscle spasms, anxiety, and insomnia.  Side effects of tizanidine including drowsiness, dizziness, dry mouth, nausea, and constipation were discussed with the patient     Continue Zoloft 200 mg at bedtime for symptoms of depression and anxiety  PARQ completed including serotonin syndrome, SIADH, worsening depression, suicidality, induction of ave, GI upset, headaches, activation, sexual side effects, sedation, potential drug interactions, and others.     Continue ongoing medication management every 4 to 6 months and continue to follow with family medicine as well as pulmonology for aforementioned medical  Recommend ongoing psychotherapy with Vanessa Byrnes on a routine basis     Patient was reminded she has outstanding bloodwork from her PCP including a CBC, CMP, and Lipid Panel. A HGA1c was ordered for screening for metabolic disorders.    Aware of need to follow up with family physician for glucose and lipid monitoring due to current therapy with antipsychotic medication  Aware of 24 hour and weekend coverage for urgent situations accessed by calling [facility] main practice number    Although patient's acute lethality risk is low, long-term/chronic lethality risk is mildly elevated in the presence of mild symptoms of depression and minimal symptoms of anxiety. At the current moment, Janna is future-oriented, forward-thinking, and  demonstrates ability to act in a self-preserving manner as evidenced by volitionally presenting to the clinic today, seeking treatment. At this juncture, inpatient hospitalization is not currently warranted. To mitigate future risk, patient should adhere to the recommendations of this writer, avoid alcohol/illicit substance use, utilize community-based resources and familiar support and prioritize mental health treatment.     Based on today's assessment and clinical criteria, Janna Kenney contracts for safety and is not an imminent risk of harm to self or others. Outpatient level of care is deemed appropriate at this present time. Janna understands that if they are no longer able to contract for safety, they need to call/contact the outpatient office including this writer, call/contact crisis and/orattend to the nearest Emergency Department for immediate evaluation.    Risks/Benefits      Risks, Benefits And Possible Side Effects Of Medications:    Risks, benefits, and possible side effects of medications explained to Janna and she verbalizes understanding and agreement for treatment.    Controlled Medication Discussion:     Not applicable - controlled prescriptions are not prescribed by this practice    Psychotherapy Provided:     Individual psychotherapy provided: Yes  Recent stressor including medical stressors discussed with Janna.   Ami continues to challenge her past mentality of industry versus inferiority (feeling negative about herself when she has not been performing activities that others would deem as productive). Supportive therapy was provided.    Treatment Plan:    Completed and signed during the session: Yes - Treatment Plan done but not signed at time of office visit due to:  Plan reviewed by video and verbal consent given due to virtual visit. Treatment Plan sent to patient via [employer] for signature.    Visit Time    Visit Start Time: 11:15 AM  Visit Stop Time: 12:00 PM  Total Visit  Duration: 45 minutes    This note was shared with patient.    Yaw Bassett MD 05/09/24    This note has been constructed using a voice recognition system. There may be translation, syntax, or grammatical errors. If you have any questions, please contact the dictating provider

## 2024-05-09 NOTE — BH TREATMENT PLAN
TREATMENT PLAN (Medication Management Only)        [facility]    Name and Date of Birth:  Janna Kenney 54 y.o. 1970  Date of Treatment Plan: May 9, 2024  Diagnosis/Diagnoses:    1. Mild episode of recurrent major depressive disorder (HCC)    2. KIP (generalized anxiety disorder)    3. Muscle spasms of lower extremity      Strengths/Personal Resources for Self-Care: supportive family, supportive friends, taking medications as prescribed, ability to adapt to life changes, ability to communicate well, ability to understand psychiatric illness, average or above intelligence, financial means, general fund of knowledge, motivation for treatment, ability to negotiate basic needs, special hobby/interest, well educated, willingness to work on problems, work skills.  Area/Areas of need (in own words): Maintain control of symptoms of depression and anxiety  1. Long Term Goal: Maintain control of symptoms of depression and anxiety.  Target Date:6 months - 11/9/2024  Person/Persons responsible for completion of goal: Dr. Danyelle Saldivar  2.  Short Term Objective (s) - How will we reach this goal?:   A. Provider new recommended medication/dosage changes and/or continue medication(s): Continue Zoloft 200 mg daily, start Zanaflex 4 mg 3 times daily as needed for muscle spasms, anxiety, and insomnia.  Take medications as prescribed  Attend psychiatry appointments regularly  Continue psychotherapy regularly  Try sleep hygiene techniques  Avoid alcohol   Avoid drugs   Eat a healthy diet   Take walks regularly  Try breathing exercises  Try relaxation techniques  Target Date:6 months - 11/9/2024  Person/Persons Responsible for Completion of Goal: Janna  Progress Towards Goals: stable, continuing treatment  Treatment Modality: Medication management every 4 to 6 months  Review due 180 days from date of this plan: 6 months - 11/9/2024  Expected length of service: ongoing  treatment  My Physician/PA/NP and I have developed this plan together and I agree to work on the goals and objectives. I understand the treatment goals that were developed for my treatment.

## 2024-05-09 NOTE — BH CRISIS PLAN
Client Name: Ami Kenney       Client YOB: 1970    [facility] Safety Plan      Creation Date: 5/9/24 Update Date: 5/9/24   Created By: Yaw Bassett MD Last Updated By: Yaw Bassett MD      Step 1: Warning Signs:   Warning Signs   When I start to feel depressed   When I can't get out of bed            Step 2: Internal Coping Strategies:   Internal Coping Strategies   Housework   Working on my car   Gardening            Step 3: People and social settings that provide distraction:   Name Contact Information   Aunt Paty Number is in patient's phone   Friends Number is in patient's phone   Hari Jettcarolina [phone]    Places   Amusement [place]   Theaters   Boating Outings           Step 4: People whom I can ask for help during a crisis:      Name Contact Information    Hari Arik [phone]    Vilma Beltre [phone]      Step 5: Professionals or agencies I can contact during a crisis:      [facility]/Agency Name Phone Emergency Contact    Dr. Bassett [phone]       Local Emergency Department Emergency Department Phone Emergency Department Address    [phone]        Crisis Phone Numbers:   Suicide Prevention Lifeline: Call or Text  341 Crisis Text Line: Text HOME to 654-705   Please note: Some [facility] do not have a separate number for Child/Adolescent specific crisis. If your county is not listed under Child/Adolescent, please call the adult number for your county      Adult Crisis Numbers: Child/Adolescent Crisis Numbers   [facility]: [phone] [facility]: [phone]   [facility]: [phone] [facility]: [phone]   [facility]: [phone] [place]: [phone]   [facility]: [phone] [facility]: [phone]   [facility]: [phone]   [facility]: [phone]   [facility]: [phone]   [facility]: [phone] (daytime) [phone] (after  hours, weekends, holidays)      Step 6: Making the environment safer (plan for lethal means safety):   Plan: Ami reports that she does have access to guns, including a 45 and a 9mm. Ami reports that when guests visit the house she locks the guns, but reports when she is alone the guns are not locked and she can quickly access them. Ami reports she has owned guns since the age of 20. Janna Kenney has no history of arrests or violence with a deadly weapon.  At the visit today the patient was counseled about gun safety. The importance of keeping guns locked at all times so as to prevent impulsive and rash decisions was discussed in detail and Ami verbalized understanding.     Optional: What is most important to me and worth living for?   I want to live for my dad, my cats, my sister, and my aunts.     Ray Safety Plan. Amanda Law and Hector Edmondson. Used with permission of the authors.

## 2024-05-09 NOTE — PATIENT INSTRUCTIONS
Please present for your previously scheduled appointment approximately 15 minutes prior to appointment time. If you are running late or are unable to attend your appointment, please call our [place] office at [phone] or our [place] office at [phone] if applicable to notify the office of your absence.    If you are in psychological crisis including not limited to suicidal/homicidal thoughts or thoughts of self-injury, do not hesitate to call/contact your County Crisis hotline (see below) or attend to the nearest emergency department.  [facility] Crisis: [phone]  [facility] Crisis: [phone]  [facility] Crisis: [phone]  [facility] Crisis: [phone]  [facility] Crisis: [phone]  [facility] Crisis: [phone]  [facility] Crisis: [phone]  National Suicide Prevention Hotline: 1-602.538.8480

## 2024-08-16 ENCOUNTER — TELEPHONE (OUTPATIENT)
Age: 54
End: 2024-08-16

## 2024-08-16 DIAGNOSIS — Z12.31 SCREENING MAMMOGRAM FOR BREAST CANCER: Primary | ICD-10-CM

## 2024-08-16 NOTE — TELEPHONE ENCOUNTER
Vielka from [facility] called to request an order for a mammo for the patient and also for PA for MRI and CT scan to be resubmitted to [facility]. Member's plan was added

## 2024-08-21 ENCOUNTER — TELEPHONE (OUTPATIENT)
Age: 54
End: 2024-08-21

## 2024-08-21 NOTE — TELEPHONE ENCOUNTER
Patient calling in to speak with Griselda concerning Insurance/Referral info . Patient disconnected call . Spoke to Cheri will let Griselda know to call her back .

## 2024-09-19 ENCOUNTER — TELEPHONE (OUTPATIENT)
Age: 54
End: 2024-09-19

## 2024-09-19 NOTE — TELEPHONE ENCOUNTER
Ami called to make appt with MM and TT. Writer scheduled with GIORGI Holbrook and then transferred to Residents to have MR scheduled with Dr Bassett.

## 2024-09-24 ENCOUNTER — TELEMEDICINE (OUTPATIENT)
Dept: PSYCHIATRY | Facility: CLINIC | Age: 54
End: 2024-09-24

## 2024-09-24 ENCOUNTER — TELEMEDICINE (OUTPATIENT)
Dept: BEHAVIORAL/MENTAL HEALTH CLINIC | Facility: CLINIC | Age: 54
End: 2024-09-24
Payer: COMMERCIAL

## 2024-09-24 DIAGNOSIS — F41.0 PANIC ATTACKS: ICD-10-CM

## 2024-09-24 DIAGNOSIS — M62.838 MUSCLE SPASMS OF LOWER EXTREMITY: ICD-10-CM

## 2024-09-24 DIAGNOSIS — F43.10 PTSD (POST-TRAUMATIC STRESS DISORDER): ICD-10-CM

## 2024-09-24 DIAGNOSIS — F17.210 CIGARETTE NICOTINE DEPENDENCE WITHOUT COMPLICATION: ICD-10-CM

## 2024-09-24 DIAGNOSIS — F41.1 GAD (GENERALIZED ANXIETY DISORDER): ICD-10-CM

## 2024-09-24 DIAGNOSIS — F33.0 MILD EPISODE OF RECURRENT MAJOR DEPRESSIVE DISORDER (HCC): Primary | ICD-10-CM

## 2024-09-24 DIAGNOSIS — F33.0 MDD (MAJOR DEPRESSIVE DISORDER), RECURRENT EPISODE, MILD (HCC): Primary | ICD-10-CM

## 2024-09-24 PROCEDURE — 90837 PSYTX W PT 60 MINUTES: CPT | Performed by: SOCIAL WORKER

## 2024-09-24 RX ORDER — SERTRALINE HYDROCHLORIDE 100 MG/1
200 TABLET, FILM COATED ORAL DAILY
Qty: 180 TABLET | Refills: 1 | Status: SHIPPED | OUTPATIENT
Start: 2024-09-24 | End: 2025-03-23

## 2024-09-24 RX ORDER — SERTRALINE HYDROCHLORIDE 100 MG/1
200 TABLET, FILM COATED ORAL DAILY
Qty: 180 TABLET | Refills: 1 | Status: SHIPPED | OUTPATIENT
Start: 2024-09-24 | End: 2024-09-24

## 2024-09-24 NOTE — ASSESSMENT & PLAN NOTE
At this time, the patient currently reports mild symptoms of depression and anxiety as measured by both the PHQ-9 KIP-7.  She reports sustained improvements in her symptoms of depression and anxiety in the midst of ongoing life stressors that include employment stressors, financial stressors, and family stressors.    Continue Zoloft 200 mg daily for symptoms of depression and anxiety  PARQ completed including serotonin syndrome, SIADH, worsening depression, suicidality, induction of ave, GI upset, headaches, activation, sexual side effects, sedation, potential drug interactions, and others.    Continue Zanaflex 4 mg up to 3 times a day as needed (for muscle spasms, anxiety, and insomnia

## 2024-09-24 NOTE — PATIENT INSTRUCTIONS
Please present for your previously scheduled appointment approximately 15 minutes prior to appointment time. If you are running late or are unable to attend your appointment, please call our [place] office at [phone] or our [place] office at [phone] if applicable to notify the office of your absence.    If you are in psychological crisis including not limited to suicidal/homicidal thoughts or thoughts of self-injury, do not hesitate to call/contact your County Crisis hotline (see below) or attend to the nearest emergency department.  [facility] Crisis: [phone]  [facility] Crisis: [phone]  [facility] Crisis: [phone]  [facility] Crisis: [phone]  [facility] Crisis: [phone]  [facility] Crisis: [phone]  [facility] Crisis: [phone]  National Suicide Prevention Hotline: 1-202.922.6153

## 2024-09-24 NOTE — BH TREATMENT PLAN
TREATMENT PLAN (Medication Management Only)        [facility]    Name and Date of Birth:  Janna Kenney 54 y.o. 1970  Date of Treatment Plan: September 24, 2024  Diagnosis/Diagnoses:    1. Mild episode of recurrent major depressive disorder (HCC)    2. KIP (generalized anxiety disorder)    3. Muscle spasms of lower extremity      Strengths/Personal Resources for Self-Care:   Supportive family, supportive friends, taking medications as prescribed, ability to adapt to life changes, ability to communicate needs, ability to communicate well, ability to understand psychiatric illness, average or above average intelligence, financial means, general fund of knowledge, motivation for treatment, ability to negotiate basic needs, special hobbies and interests, well educated, willingness to work on problems, and work skills.  Area/Areas of need (in own words): Maintain control of symptoms of depression and anxiety  1. Long Term Goal: Maintain control of symptoms of depression and anxiety.  Target Date:6 months - 3/24/2025  Person/Persons responsible for completion of goal: Dr. Danyelle Saldivar  2.  Short Term Objective (s) - How will we reach this goal?:   A. Provider new recommended medication/dosage changes and/or continue medication(s): Continue current psych occasions as prescribed (Zoloft 200 mg daily, Zanaflex 4 mg 3 times daily as needed (muscle spasms, anxiety, and insomnia).  Take medications as prescribed  Attend psychiatry appointments regularly  Continue psychotherapy regularly  Practice coping skills  Try sleep hygiene techniques  Avoid alcohol   Avoid drugs   Eat a healthy diet   Exercise regularly  Try relaxation techniques  Target Date:3 months - 12/24/2024  Person/Persons Responsible for Completion of Goal: Janna  Progress Towards Goals: stable, continuing treatment  Treatment Modality: Medication management every 3 to 6 months  Review due 180  days from date of this plan: 6 months - 3/24/2025  Expected length of service: ongoing treatment  My Physician/PA/NP and I have developed this plan together and I agree to work on the goals and objectives. I understand the treatment goals that were developed for my treatment.

## 2024-09-25 DIAGNOSIS — R53.83 OTHER FATIGUE: ICD-10-CM

## 2024-09-25 DIAGNOSIS — E66.3 OVERWEIGHT (BMI 25.0-29.9): ICD-10-CM

## 2024-09-25 DIAGNOSIS — E78.2 MIXED HYPERLIPIDEMIA: Primary | ICD-10-CM
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
## 2024-09-26 DIAGNOSIS — J43.8 OTHER EMPHYSEMA (HCC): ICD-10-CM

## 2024-09-26 RX ORDER — FLUTICASONE FUROATE, UMECLIDINIUM BROMIDE AND VILANTEROL TRIFENATATE 200; 62.5; 25 UG/1; UG/1; UG/1
1 POWDER RESPIRATORY (INHALATION) DAILY
Qty: 180 BLISTER | Refills: 3 | Status: SHIPPED | OUTPATIENT
Start: 2024-09-26 | End: 2025-09-21

## 2024-09-27 ENCOUNTER — TELEPHONE (OUTPATIENT)
Dept: PSYCHIATRY | Facility: CLINIC | Age: 54
End: 2024-09-27

## 2024-10-21 ENCOUNTER — TELEPHONE (OUTPATIENT)
Dept: FAMILY MEDICINE CLINIC | Facility: HOSPITAL | Age: 54
End: 2024-10-21

## 2024-10-21 LAB
25(OH)D3 SERPL-MCNC: 54 NG/ML (ref 30–100)
ALBUMIN SERPL-MCNC: 4.6 G/DL (ref 3.6–5.1)
ALBUMIN/GLOB SERPL: 2.1 (CALC) (ref 1–2.5)
ALP SERPL-CCNC: 72 U/L (ref 37–153)
ALT SERPL-CCNC: 15 U/L (ref 6–29)
AST SERPL-CCNC: 16 U/L (ref 10–35)
BASOPHILS # BLD AUTO: 67 CELLS/UL (ref 0–200)
BASOPHILS NFR BLD AUTO: 0.6 %
BILIRUB SERPL-MCNC: 0.3 MG/DL (ref 0.2–1.2)
BUN SERPL-MCNC: 13 MG/DL (ref 7–25)
BUN/CREAT SERPL: NORMAL (CALC) (ref 6–22)
CALCIUM SERPL-MCNC: 9.8 MG/DL (ref 8.6–10.4)
CHLORIDE SERPL-SCNC: 107 MMOL/L (ref 98–110)
CHOLEST SERPL-MCNC: 239 MG/DL
CHOLEST/HDLC SERPL: 4.2 (CALC)
CO2 SERPL-SCNC: 28 MMOL/L (ref 20–32)
CREAT SERPL-MCNC: 0.85 MG/DL (ref 0.5–1.03)
EOSINOPHIL # BLD AUTO: 157 CELLS/UL (ref 15–500)
EOSINOPHIL NFR BLD AUTO: 1.4 %
ERYTHROCYTE [DISTWIDTH] IN BLOOD BY AUTOMATED COUNT: 12.8 % (ref 11–15)
FOLATE SERPL-MCNC: >24 NG/ML
GFR/BSA.PRED SERPLBLD CYS-BASED-ARV: 81 ML/MIN/1.73M2
GLOBULIN SER CALC-MCNC: 2.2 G/DL (CALC) (ref 1.9–3.7)
GLUCOSE SERPL-MCNC: 91 MG/DL (ref 65–99)
HBA1C MFR BLD: 5.4 % OF TOTAL HGB
HCT VFR BLD AUTO: 44.8 % (ref 35–45)
HDLC SERPL-MCNC: 57 MG/DL
HGB BLD-MCNC: 14.9 G/DL (ref 11.7–15.5)
LDLC SERPL CALC-MCNC: 150 MG/DL (CALC)
LYMPHOCYTES # BLD AUTO: 5107 CELLS/UL (ref 850–3900)
LYMPHOCYTES NFR BLD AUTO: 45.6 %
MCH RBC QN AUTO: 32 PG (ref 27–33)
MCHC RBC AUTO-ENTMCNC: 33.3 G/DL (ref 32–36)
MCV RBC AUTO: 96.1 FL (ref 80–100)
MONOCYTES # BLD AUTO: 616 CELLS/UL (ref 200–950)
MONOCYTES NFR BLD AUTO: 5.5 %
NEUTROPHILS # BLD AUTO: 5253 CELLS/UL (ref 1500–7800)
NEUTROPHILS NFR BLD AUTO: 46.9 %
NONHDLC SERPL-MCNC: 182 MG/DL (CALC)
PLATELET # BLD AUTO: 350 THOUSAND/UL (ref 140–400)
PMV BLD REES-ECKER: 9.8 FL (ref 7.5–12.5)
POTASSIUM SERPL-SCNC: 4.2 MMOL/L (ref 3.5–5.3)
PROT SERPL-MCNC: 6.8 G/DL (ref 6.1–8.1)
RBC # BLD AUTO: 4.66 MILLION/UL (ref 3.8–5.1)
REF LAB TEST NAME: NORMAL
REF LAB TEST: NORMAL
SL AMB CLIENT CONTACT: NORMAL
SODIUM SERPL-SCNC: 141 MMOL/L (ref 135–146)
TRIGL SERPL-MCNC: 188 MG/DL
TSH SERPL-ACNC: 3.08 MIU/L
VIT B12 SERPL-MCNC: 544 PG/ML (ref 200–1100)
WBC # BLD AUTO: 11.2 THOUSAND/UL (ref 3.8–10.8)

## 2024-10-21 NOTE — TELEPHONE ENCOUNTER
I added ha1c to labs, I called [facility] and talked to sanna.  Used impaired fasting glucose as pt has in past.  Mess to pt too.   Other labs were already added and done.      You had requested bloodwork for me in January. However, I didn't have insurance and had to put it off.      My new insurance became effective August 15th and they required new scripts in order to have the tests covered. I spoke with your office and requested updated scripts for all blood work ordered in January.      I was wondering if updated scripts were written for the following tests: Vitamin B12, Folate, Hemoglobin A1C, Vitamin D 25 Hydroxy and TSH 3rd generation with FT4 reflex.     I had my blood test completed Saturday morning and I have not seen any results for the tests listed above.     Thank you,     Ami Kenney

## 2024-10-21 NOTE — TELEPHONE ENCOUNTER
I called [facility] and added the ha1c.  She added it.  I called pt and told her I added it.  The rest of the labs were added and done --ha1c was the only one not added.  Used impaired fasting glucose from previous glucose in past that was elevated.

## 2024-10-24 ENCOUNTER — TELEPHONE (OUTPATIENT)
Dept: FAMILY MEDICINE CLINIC | Facility: HOSPITAL | Age: 54
End: 2024-10-24

## 2024-10-24 NOTE — TELEPHONE ENCOUNTER
----- Message from Evangelina Tamez DO sent at 10/23/2024  4:52 PM EDT -----  Lipid with  slight improvement in total cholesterol  but still high risk with ldl of 150- needs appt for evaluation   For annual physical   Check if she called to get ct of chest and mammogram done yet

## 2024-10-24 NOTE — TELEPHONE ENCOUNTER
Relayed results to pt as per provider message. Pt expressed understanding. Pt advised that [facility] does not accept her new plan and does not want to pay out of pocket for tests. Stated her insurance should change again in the future but unsure as to when.   In the meantime, can the ct, mri, and mammo orders be changed to reflect external? Pt stated she was able to complete her xray at [facility] with no issue. She has no intention of changing PCPs and intends on completing physical once she switches insurance.  Also regarding the LDLs, pt would like to know what else can be done or if an rx can be sent through for it. If so, it will need to go to [facility] for a 3mo supply as that's what her insurance will cover.  Please contact to advise

## 2024-10-25 DIAGNOSIS — R41.840 CONCENTRATION DEFICIT: ICD-10-CM

## 2024-10-25 DIAGNOSIS — Z12.2 SCREENING FOR LUNG CANCER: ICD-10-CM

## 2024-10-25 DIAGNOSIS — Z12.31 SCREENING MAMMOGRAM FOR BREAST CANCER: Primary | ICD-10-CM

## 2024-10-25 DIAGNOSIS — R41.3 MEMORY CHANGES: ICD-10-CM

## 2024-10-28 DIAGNOSIS — J43.8 OTHER EMPHYSEMA (HCC): ICD-10-CM

## 2024-10-28 NOTE — TELEPHONE ENCOUNTER
Patient asking PCP to maintain medication and refills. Patient has surgery Wednesday and was directed to have rescue inhaler with her.     Reason for call:   [x] Refill   [] Prior Auth  [] Other:     Office:   [x] PCP/Provider -   [] Specialty/Provider -     Medication: albuterol (PROAIR HFA) 90 mcg/act inhaler /  Inhale 2 puffs every 6 (six) hours as needed for wheezing or shortness of breath    Pharmacy: [id number] - [place] - [address] [place] [address]     Does the patient have enough for 3 days?   [] Yes   [x] No - Send as HP to POD

## 2024-10-29 RX ORDER — ALBUTEROL SULFATE 90 UG/1
2 INHALANT RESPIRATORY (INHALATION) EVERY 6 HOURS PRN
Qty: 18 G | Refills: 3 | Status: SHIPPED | OUTPATIENT
Start: 2024-10-29

## 2024-10-30 NOTE — TELEPHONE ENCOUNTER
Patient has insurance but we do not accept it - she said she still wants you has her pcp - having knee surgery today - as soon as she is on her feet and goes back to work she will be back in the office for visits - just and fyi

## 2024-12-12 ENCOUNTER — TELEPHONE (OUTPATIENT)
Age: 54
End: 2024-12-12

## 2024-12-12 NOTE — TELEPHONE ENCOUNTER
Pt called to speak with Griselda. She wanted to discuss the referral for the MRI brain and the also CT lung scan.    Please call her back at [phone]

## 2024-12-13 ENCOUNTER — TELEPHONE (OUTPATIENT)
Dept: FAMILY MEDICINE CLINIC | Facility: HOSPITAL | Age: 54
End: 2024-12-13

## 2024-12-13 NOTE — TELEPHONE ENCOUNTER
Ami needs a prior auth for two studies (ambulatory referrals are in epic)      MRI OF BRAIN - SCHEDULED 12/19 AT 5:00PM   CT LUNG SCREENING- NOT SCHEDULED YET - NEEDS PRIOR AUTH BEFORE THEY WILL SCHEDULE    BOTH BEING DONE AT [facility]  [address] [facility]    FAX AUTH TO:  [phone]    # [id number]

## 2024-12-18 ENCOUNTER — TELEPHONE (OUTPATIENT)
Age: 54
End: 2024-12-18

## 2024-12-18 NOTE — TELEPHONE ENCOUNTER
Homa from [facility] because they still have not received the script for pt's appt tomorrow and would like if faxed to [phone]. Please advise, TY.

## 2024-12-19 NOTE — TELEPHONE ENCOUNTER
Homa from [facility] calling again to request the order for MRI brain be sent to [phone] as soon as possible. She is there for the appt now. Thank you!

## 2024-12-19 NOTE — TELEPHONE ENCOUNTER
Katharina from [facility] imaging services MRI called stating that they would need a new request for the prior auth for with/without contrast to be sent before 4:30pm today. Please advise back as soon as possible.

## 2024-12-20 ENCOUNTER — TELEMEDICINE (OUTPATIENT)
Dept: BEHAVIORAL/MENTAL HEALTH CLINIC | Facility: CLINIC | Age: 54
End: 2024-12-20
Payer: COMMERCIAL

## 2024-12-20 DIAGNOSIS — F41.0 PANIC ATTACKS: ICD-10-CM

## 2024-12-20 DIAGNOSIS — F41.1 GAD (GENERALIZED ANXIETY DISORDER): ICD-10-CM

## 2024-12-20 DIAGNOSIS — F33.0 MDD (MAJOR DEPRESSIVE DISORDER), RECURRENT EPISODE, MILD (HCC): Primary | ICD-10-CM

## 2024-12-20 DIAGNOSIS — F17.210 CIGARETTE NICOTINE DEPENDENCE WITHOUT COMPLICATION: ICD-10-CM

## 2024-12-20 DIAGNOSIS — F43.10 PTSD (POST-TRAUMATIC STRESS DISORDER): ICD-10-CM

## 2024-12-20 PROCEDURE — 90837 PSYTX W PT 60 MINUTES: CPT | Performed by: SOCIAL WORKER

## 2024-12-30 ENCOUNTER — TELEPHONE (OUTPATIENT)
Dept: FAMILY MEDICINE CLINIC | Facility: HOSPITAL | Age: 54
End: 2024-12-30

## 2024-12-30 NOTE — TELEPHONE ENCOUNTER
DERIK AMOR IS ASKING FOR YOUR INPUT ON HER MRI RESULTS - SHE SAID SHE DID MAKE AN APPT. TO SEE THE NEUROLOGIST BUT WANTS YOUR OPINION ALSO

## 2024-12-30 NOTE — TELEPHONE ENCOUNTER
WE DO NOT TAKE HER INSURANCE AT THIS TIME - SHE IS TRYING TO CHANGE INSURANCE PROVIDERS BUT AT THIS POINT SHE IS NOT EMPLOYEED

## 2025-01-06 ENCOUNTER — TELEMEDICINE (OUTPATIENT)
Dept: BEHAVIORAL/MENTAL HEALTH CLINIC | Facility: CLINIC | Age: 55
End: 2025-01-06
Payer: COMMERCIAL

## 2025-01-06 DIAGNOSIS — F43.10 PTSD (POST-TRAUMATIC STRESS DISORDER): ICD-10-CM

## 2025-01-06 DIAGNOSIS — F33.0 MDD (MAJOR DEPRESSIVE DISORDER), RECURRENT EPISODE, MILD (HCC): ICD-10-CM

## 2025-01-06 DIAGNOSIS — F17.210 CIGARETTE NICOTINE DEPENDENCE WITHOUT COMPLICATION: ICD-10-CM

## 2025-01-06 DIAGNOSIS — F41.0 PANIC ATTACKS: ICD-10-CM

## 2025-01-06 DIAGNOSIS — F41.1 GAD (GENERALIZED ANXIETY DISORDER): Primary | ICD-10-CM

## 2025-01-06 PROCEDURE — 90837 PSYTX W PT 60 MINUTES: CPT | Performed by: SOCIAL WORKER
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
## 2025-01-20 ENCOUNTER — TELEMEDICINE (OUTPATIENT)
Dept: BEHAVIORAL/MENTAL HEALTH CLINIC | Facility: CLINIC | Age: 55
End: 2025-01-20
Payer: COMMERCIAL

## 2025-01-20 DIAGNOSIS — F41.1 GAD (GENERALIZED ANXIETY DISORDER): ICD-10-CM

## 2025-01-20 DIAGNOSIS — F41.0 PANIC ATTACKS: ICD-10-CM

## 2025-01-20 DIAGNOSIS — F17.210 CIGARETTE NICOTINE DEPENDENCE WITHOUT COMPLICATION: ICD-10-CM

## 2025-01-20 DIAGNOSIS — F33.0 MDD (MAJOR DEPRESSIVE DISORDER), RECURRENT EPISODE, MILD (HCC): Primary | ICD-10-CM

## 2025-01-20 DIAGNOSIS — F43.10 PTSD (POST-TRAUMATIC STRESS DISORDER): ICD-10-CM

## 2025-01-20 PROCEDURE — 90837 PSYTX W PT 60 MINUTES: CPT | Performed by: SOCIAL WORKER
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
## 2025-03-07 ENCOUNTER — TELEPHONE (OUTPATIENT)
Dept: BEHAVIORAL/MENTAL HEALTH CLINIC | Facility: CLINIC | Age: 55
End: 2025-03-07

## 2025-03-11 ENCOUNTER — TELEMEDICINE (OUTPATIENT)
Dept: PSYCHIATRY | Facility: CLINIC | Age: 55
End: 2025-03-11

## 2025-03-11 DIAGNOSIS — F41.1 GAD (GENERALIZED ANXIETY DISORDER): ICD-10-CM

## 2025-03-11 DIAGNOSIS — F33.0 MILD EPISODE OF RECURRENT MAJOR DEPRESSIVE DISORDER (HCC): Primary | ICD-10-CM

## 2025-03-11 DIAGNOSIS — M62.838 MUSCLE SPASMS OF LOWER EXTREMITY: ICD-10-CM

## 2025-03-11 RX ORDER — SERTRALINE HYDROCHLORIDE 100 MG/1
200 TABLET, FILM COATED ORAL DAILY
Qty: 180 TABLET | Refills: 1 | Status: SHIPPED | OUTPATIENT
Start: 2025-03-11 | End: 2025-09-07

## 2025-03-11 RX ORDER — AMLODIPINE BESYLATE 2.5 MG/1
2.5 TABLET ORAL DAILY
COMMUNITY

## 2025-03-11 RX ORDER — TRAZODONE HYDROCHLORIDE 50 MG/1
50 TABLET ORAL
Qty: 90 TABLET | Refills: 1 | Status: SHIPPED | OUTPATIENT
Start: 2025-03-11

## 2025-03-11 NOTE — PSYCH
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 symptoms of depression and scores a 3 on the PHQ-9. She reports minimal symptoms of anxiety and scores a 2 on the KIP-7. Janna adamantly denies suicidal ideation, homicidal ideation, plan or intent to harm herself or others. Janna has been adherent to her psychiatric medication regimen of Zoloft 200 mg daily, Zanaflex 4 mg up to 3 times daily as needed (for muscle spasms, anxiety, and insomnia).  She reports that she takes the Zanaflex sparingly and rarely.  Following a thorough medication discussion, the patient was started on trazodone 50 mg at bedtime as needed for sleep.    Assessment & Plan  Mild episode of recurrent major depressive disorder (HCC)  Continue Zoloft 200 mg at bedtime as needed for symptoms of depression and anxiety  PARQ completed including serotonin syndrome, SIADH, worsening depression, suicidality, induction of ave, GI upset, headaches, activation, sexual side effects, sedation, potential drug interactions, and others.    Started trazodone 50  mg at bedtime as needed for sleep in the setting of major depressive disorder  PARQ was completed for trazodone including sedation, dizziness, headache, GI distress, priapism, suicidal thoughts in patients under 25 years old, and serotonin syndrome. Educated patient that taking this medication with food can increase these adverse effects.  KIP (generalized anxiety disorder)  Continue Zoloft 200 mg at bedtime as needed for symptoms of depression and anxiety  PARQ completed including serotonin syndrome, SIADH, worsening depression, suicidality, induction of ave, GI upset, headaches, activation, sexual side effects, sedation, potential drug interactions, and others.  Muscle spasms of lower extremity  Continue Zanaflex 4 mg 3 times daily as needed for muscle spasms, anxiety, and insomnia  Side effects of Zanaflex were discussed including drowsiness, dizziness, fatigue, constipation, nausea      Continue ongoing medication management every 3 to 6 months  Continue ongoing psychotherapy with therapist Vanessa (generally on a monthly basis)  Aware of need to follow up with family physician for medical issues  Aware of 24 hour and weekend coverage for urgent situations accessed by calling [facility] main practice number    Although patient's acute lethality risk is low, long-term/chronic lethality risk is mildly elevated in the presence of minimal symptoms of depression and anxiety. At the current moment, Janna is future-oriented, forward-thinking, and demonstrates ability to act in a self-preserving manner as evidenced by volitionally presenting to the clinic today, seeking treatment. At this juncture, inpatient hospitalization is not currently warranted. To mitigate future risk, patient should adhere to the recommendations of this writer, avoid alcohol/illicit substance use, utilize community-based resources and familiar support and prioritize mental health treatment.     Based on today's  assessment and clinical criteria, Janna Kenney contracts for safety and is not an imminent risk of harm to self or others. Outpatient level of care is deemed appropriate at this present time. Janna understands that if they are no longer able to contract for safety, they need to call/contact the outpatient office including this writer, call/contact crisis and/orattend to the nearest Emergency Department for immediate evaluation.    Risks/Benefits      Risks, Benefits And Possible Side Effects Of Medications:    Risks, benefits, and possible side effects of medications explained to Janna and she verbalizes understanding and agreement for treatment.    Controlled Medication Discussion:     Not applicable - controlled prescriptions are not prescribed by this practice    Psychotherapy Provided:     Individual psychotherapy provided: Yes  Recent stressor including medical stressors, job stressors, financial stressors, family stressors discussed with Janna.   Supportive therapy provided.     Treatment Plan:    Completed and signed during the session: Yes - Treatment Plan done but not signed at time of office visit due to:  Plan reviewed by video and verbal consent given due to virtual visit. Treatment Plan sent to patient via Swoop for signature.    Visit Time    Visit Start Time: 8:00 AM  Visit Stop Time: 8:30 AM  Total Visit Duration: 30 minutes    This note was shared with patient.    Yaw Bassett MD 03/11/25    This note has been constructed using a voice recognition system. There may be translation, syntax, or grammatical errors. If you have any questions, please contact the dictating provider.

## 2025-03-11 NOTE — BH TREATMENT PLAN
TREATMENT PLAN (Medication Management Only)        [facility]    Name and Date of Birth:  Janna Kenney 54 y.o. 1970  MRN: [id number]  Date of Treatment Plan: March 11, 2025  Diagnosis/Diagnoses:    1. Mild episode of recurrent major depressive disorder (HCC)    2. KIP (generalized anxiety disorder)    3. Muscle spasms of lower extremity      Strengths/Personal Resources for Self-Care: supportive family, supportive friends, taking medications as prescribed, ability to adapt to life changes, ability to communicate needs, ability to communicate well, ability to reason, ability to understand psychiatric illness, average or above intelligence, financial means, general fund of knowledge, motivation for treatment, ability to negotiate basic needs, being resoureceful, well educated, willingness to work on problems, work skills.  Area/Areas of need (in own words): Maintain control of symptoms of depression and anxiety  1. Long Term Goal:   Maintain control of symptoms of depression and anxiety.  Target Date:6 months - 9/11/2025  Person/Persons responsible for completion of goal: Dr. Danyelle Saldivar  2.  Short Term Objective (s) - How will we reach this goal?:   A.  Provider new recommended medication/dosage changes and/or continue medication(s): Continue Zoloft 200 mg daily, Zanaflex 4 mg up to 3 times daily as needed (for muscle spasms, anxiety, insomnia), trazodone 50 mg at bedtime as needed for insomnia.  Take medications as prescribed  Attend psychiatry appointments regularly  Continue psychotherapy regularly  Practice coping skills  Try sleep hygiene techniques  Avoid alcohol   Avoid drugs   Eat a healthy diet   Exercise regularly  Try breathing exercises  Try relaxation techniques  Target Date:3 months - 6/11/2025  Person/Persons Responsible for Completion of Goal: Janna  Progress Towards Goals: stable, continuing treatment  Treatment Modality:  Medication management every 3 to 6 months  Review due 180 days from date of this plan: 6 months - 9/11/2025  Expected length of service: maintenance unless revised  My Physician/PA/NP and I have developed this plan together and I agree to work on the goals and objectives. I understand the treatment goals that were developed for my treatment.   Electronic Signatures: on file (unless signed below)    Yaw Bassett MD 03/11/25

## 2025-03-11 NOTE — ASSESSMENT & PLAN NOTE
Continue Zoloft 200 mg at bedtime as needed for symptoms of depression and anxiety  PARQ completed including serotonin syndrome, SIADH, worsening depression, suicidality, induction of ave, GI upset, headaches, activation, sexual side effects, sedation, potential drug interactions, and others.  
Patient informed

## 2025-03-11 NOTE — BH CRISIS PLAN
Client Name: Ami Kenney       Client YOB: 1970    [facility] Safety Plan      Creation Date: 5/9/24 Update Date: 3/11/25   Created By: Yaw Bassett MD Last Updated By: Yaw Bassett MD      Step 1: Warning Signs:   Warning Signs   When I start to feel depressed   When I can't get out of bed   When I'm not going out and interacting with others            Step 2: Internal Coping Strategies:   Internal Coping Strategies   Housework   Working on my car   Gardening   Watching sports            Step 3: People and social settings that provide distraction:   Name Contact Information   Aunt Paty Number is in patient's phone   Hari Jettcarolina [phone]   Vilma Beltre [phone]   Sridhar (Friend) Number is in patient's phone    Places   Amusement [employer]   Theaters   Lunch           Step 4: People whom I can ask for help during a crisis:      Name Contact Information    Hari Arik [phone]    Vilma Beltre [phone]      Step 5: Professionals or agencies I can contact during a crisis:      Clinican/Agency Name Phone Emergency Contact    Dr. Bassett [phone]       Local Emergency Department Emergency Department Phone Emergency Department Address    [phone]        Crisis Phone Numbers:   Suicide Prevention Lifeline: Call or Text  993 Crisis Text Line: Text HOME to 335-619   Please note: Some [facility] do not have a separate number for Child/Adolescent specific crisis. If your county is not listed under Child/Adolescent, please call the adult number for your county      Adult Crisis Numbers: Child/Adolescent Crisis Numbers   [facility]: [phone] [facility]: [phone]   [facility]: [phone] [facility]: [phone]   [facility]: [phone] [place]: [phone]   [facility]: [phone] [place] [facility]: [phone]   [place] [facility]: [phone]   [facility]: [phone]   [facility]  [facility]: [phone]   [facility]: [phone] (daytime) [phone] (after hours, weekends, holidays)      Step 6: Making the environment safer (plan for lethal means safety):   Plan: Ami reports that she does have access to guns, including a 45 and a 9mm. Ami reports that when guests visit the house she locks the guns, but reports when she is alone the guns are not locked and she can quickly access them. Ami reports she has owned guns since the age of 20. Janna Kenney has no history of arrests or violence with a deadly weapon.  At the visit today the patient was counseled about gun safety. The importance of keeping guns locked at all times so as to prevent impulsive and rash decisions was discussed in detail and Ami verbalized understanding.     Optional: What is most important to me and worth living for?   I want to live for my family, my friends, my 3 cats.      Ray Safety Plan. Amanda Law and Hector Edmondson. Used with permission of the authors.

## 2025-03-13 ENCOUNTER — TELEPHONE (OUTPATIENT)
Dept: PSYCHIATRY | Facility: CLINIC | Age: 55
End: 2025-03-13

## 2025-03-13 ENCOUNTER — TELEPHONE (OUTPATIENT)
Age: 55
End: 2025-03-13

## 2025-03-13 NOTE — TELEPHONE ENCOUNTER
Patient is calling regarding cancelling an appointment.    Date/Time: 3/13/25    Reason: Insurance issue     Patient was rescheduled: YES [] NO [x]  If yes, when was Patient reschedule for: next appt 4/15/25    Patient requesting call back to reschedule: YES [] NO [x]

## 2025-03-13 NOTE — TELEPHONE ENCOUNTER
Spoke to client and scheduled her with a new psych provider at [facility] due to insurance not being excepted at [facility].  Clt is going to think about if she wants to schedule with a new therapist, and will call me back.

## 2025-03-13 NOTE — TELEPHONE ENCOUNTER
Patient sees a resident But has [facility] insurance.  Patient saw resident for the last time Tuesday  Patient is ok to for [place] in Sept. Dr Bassett stated that he will do refills until he leaves in July.